# Patient Record
Sex: FEMALE | Race: WHITE | NOT HISPANIC OR LATINO | Employment: OTHER | URBAN - METROPOLITAN AREA
[De-identification: names, ages, dates, MRNs, and addresses within clinical notes are randomized per-mention and may not be internally consistent; named-entity substitution may affect disease eponyms.]

---

## 2020-04-02 ENCOUNTER — NURSING HOME VISIT (OUTPATIENT)
Dept: INTERNAL MEDICINE CLINIC | Facility: CLINIC | Age: 75
End: 2020-04-02
Payer: COMMERCIAL

## 2020-04-02 VITALS
SYSTOLIC BLOOD PRESSURE: 129 MMHG | TEMPERATURE: 97.2 F | OXYGEN SATURATION: 98 % | DIASTOLIC BLOOD PRESSURE: 59 MMHG | RESPIRATION RATE: 18 BRPM | HEART RATE: 88 BPM

## 2020-04-02 DIAGNOSIS — E11.3293 TYPE 2 DIABETES MELLITUS WITH MILD NONPROLIFERATIVE RETINOPATHY OF BOTH EYES WITHOUT MACULAR EDEMA, UNSPECIFIED WHETHER LONG TERM INSULIN USE (HCC): ICD-10-CM

## 2020-04-02 DIAGNOSIS — I10 ESSENTIAL HYPERTENSION: ICD-10-CM

## 2020-04-02 DIAGNOSIS — B02.9 HERPES ZOSTER WITHOUT COMPLICATION: Primary | ICD-10-CM

## 2020-04-02 PROBLEM — B02.8 HERPES ZOSTER WITH COMPLICATION: Status: ACTIVE | Noted: 2020-04-02

## 2020-04-02 PROCEDURE — 99309 SBSQ NF CARE MODERATE MDM 30: CPT | Performed by: NURSE PRACTITIONER

## 2020-04-06 PROBLEM — F33.9 RECURRENT MAJOR DEPRESSIVE DISORDER (HCC): Status: ACTIVE | Noted: 2020-04-06

## 2020-04-06 PROBLEM — D61.818 PANCYTOPENIA (HCC): Status: ACTIVE | Noted: 2020-04-06

## 2020-04-06 PROBLEM — F32.5 MAJOR DEPRESSIVE DISORDER IN REMISSION (HCC): Status: ACTIVE | Noted: 2020-04-06

## 2020-04-06 PROBLEM — E66.9 DIABETES MELLITUS TYPE 2 IN OBESE (HCC): Status: ACTIVE | Noted: 2020-04-06

## 2020-04-06 PROBLEM — I10 HYPERTENSION: Status: ACTIVE | Noted: 2020-04-06

## 2020-04-06 PROBLEM — E11.69 DIABETES MELLITUS TYPE 2 IN OBESE (HCC): Status: ACTIVE | Noted: 2020-04-06

## 2020-04-06 PROBLEM — D50.9 IRON DEFICIENCY ANEMIA: Status: ACTIVE | Noted: 2020-04-06

## 2020-04-06 PROBLEM — K74.60 LIVER CIRRHOSIS (HCC): Status: ACTIVE | Noted: 2020-04-06

## 2020-04-06 PROBLEM — E55.9 VITAMIN D DEFICIENCY: Status: ACTIVE | Noted: 2020-04-06

## 2020-04-06 PROBLEM — Z86.73 HISTORY OF CVA (CEREBROVASCULAR ACCIDENT): Status: ACTIVE | Noted: 2020-04-06

## 2020-04-06 PROBLEM — R33.9 URINARY RETENTION: Status: ACTIVE | Noted: 2020-04-06

## 2020-04-06 PROBLEM — H04.123 DRY EYES, BILATERAL: Status: ACTIVE | Noted: 2020-04-06

## 2020-04-06 PROBLEM — Z87.19 HISTORY OF GI BLEED: Status: ACTIVE | Noted: 2020-04-06

## 2020-04-06 PROBLEM — H91.90 HEARING LOSS: Status: ACTIVE | Noted: 2020-04-06

## 2020-04-06 PROBLEM — I44.30 HEART BLOCK, AV: Status: ACTIVE | Noted: 2020-04-06

## 2020-04-06 PROBLEM — G62.9 NEUROPATHY: Status: ACTIVE | Noted: 2020-04-06

## 2020-04-06 PROBLEM — G47.00 INSOMNIA: Status: ACTIVE | Noted: 2020-04-06

## 2020-04-06 PROBLEM — F51.01 PRIMARY INSOMNIA: Status: ACTIVE | Noted: 2020-04-06

## 2020-04-06 PROBLEM — Z95.0 HISTORY OF CARDIAC PACEMAKER: Status: ACTIVE | Noted: 2020-04-06

## 2020-04-06 RX ORDER — FERROUS SULFATE 325(65) MG
325 TABLET ORAL DAILY
COMMUNITY

## 2020-04-06 RX ORDER — ACETAMINOPHEN 325 MG/1
650 TABLET ORAL EVERY 4 HOURS PRN
COMMUNITY

## 2020-04-06 RX ORDER — METHYL SALICYLATE/MENTHOL 15%-10%
1 CREAM (GRAM) TOPICAL 3 TIMES DAILY
COMMUNITY

## 2020-04-06 RX ORDER — ESCITALOPRAM OXALATE 10 MG/1
10 TABLET ORAL DAILY
COMMUNITY

## 2020-04-06 RX ORDER — LISINOPRIL 10 MG/1
20 TABLET ORAL DAILY
COMMUNITY
End: 2020-12-24 | Stop reason: HOSPADM

## 2020-04-06 RX ORDER — TAMSULOSIN HYDROCHLORIDE 0.4 MG/1
2 CAPSULE ORAL DAILY
COMMUNITY

## 2020-04-06 RX ORDER — POLYETHYLENE GLYCOL 3350 17 G/17G
17 POWDER, FOR SOLUTION ORAL DAILY PRN
COMMUNITY

## 2020-04-06 RX ORDER — POLYVINYL ALCOHOL 14 MG/ML
1 SOLUTION/ DROPS OPHTHALMIC AS NEEDED
COMMUNITY

## 2020-04-06 RX ORDER — ALBUTEROL SULFATE 2.5 MG/3ML
2.5 SOLUTION RESPIRATORY (INHALATION) EVERY 4 HOURS PRN
COMMUNITY

## 2020-04-06 RX ORDER — FAMCICLOVIR 500 MG/1
500 TABLET, FILM COATED ORAL 3 TIMES DAILY
COMMUNITY
End: 2020-04-12

## 2020-04-06 RX ORDER — GABAPENTIN 600 MG/1
600 TABLET ORAL 3 TIMES DAILY
COMMUNITY

## 2020-04-06 RX ORDER — ASPIRIN 81 MG/1
81 TABLET ORAL DAILY
COMMUNITY

## 2020-05-04 ENCOUNTER — NURSING HOME VISIT (OUTPATIENT)
Dept: FAMILY MEDICINE CLINIC | Facility: CLINIC | Age: 75
End: 2020-05-04
Payer: COMMERCIAL

## 2020-05-04 VITALS
DIASTOLIC BLOOD PRESSURE: 86 MMHG | HEART RATE: 85 BPM | WEIGHT: 211 LBS | TEMPERATURE: 97.6 F | SYSTOLIC BLOOD PRESSURE: 176 MMHG | BODY MASS INDEX: 38.59 KG/M2 | OXYGEN SATURATION: 94 %

## 2020-05-04 DIAGNOSIS — D61.818 PANCYTOPENIA (HCC): ICD-10-CM

## 2020-05-04 DIAGNOSIS — H91.93 BILATERAL HEARING LOSS, UNSPECIFIED HEARING LOSS TYPE: ICD-10-CM

## 2020-05-04 DIAGNOSIS — E11.3293 TYPE 2 DIABETES MELLITUS WITH MILD NONPROLIFERATIVE RETINOPATHY OF BOTH EYES WITHOUT MACULAR EDEMA, UNSPECIFIED WHETHER LONG TERM INSULIN USE (HCC): Primary | ICD-10-CM

## 2020-05-04 DIAGNOSIS — K74.69 OTHER CIRRHOSIS OF LIVER (HCC): ICD-10-CM

## 2020-05-04 DIAGNOSIS — M19.90 ARTHRITIS: ICD-10-CM

## 2020-05-04 DIAGNOSIS — I10 ESSENTIAL HYPERTENSION: ICD-10-CM

## 2020-05-04 DIAGNOSIS — Z95.0 HISTORY OF CARDIAC PACEMAKER: ICD-10-CM

## 2020-05-04 DIAGNOSIS — F33.9 RECURRENT MAJOR DEPRESSIVE DISORDER, REMISSION STATUS UNSPECIFIED (HCC): ICD-10-CM

## 2020-05-04 DIAGNOSIS — D50.9 IRON DEFICIENCY ANEMIA, UNSPECIFIED IRON DEFICIENCY ANEMIA TYPE: ICD-10-CM

## 2020-05-04 DIAGNOSIS — G62.9 NEUROPATHY: ICD-10-CM

## 2020-05-04 DIAGNOSIS — E55.9 VITAMIN D DEFICIENCY: ICD-10-CM

## 2020-05-04 DIAGNOSIS — Z86.73 HISTORY OF CVA (CEREBROVASCULAR ACCIDENT): ICD-10-CM

## 2020-05-04 PROCEDURE — G2012 BRIEF CHECK IN BY MD/QHP: HCPCS | Performed by: INTERNAL MEDICINE

## 2020-05-13 ENCOUNTER — NURSING HOME VISIT (OUTPATIENT)
Dept: FAMILY MEDICINE CLINIC | Facility: CLINIC | Age: 75
End: 2020-05-13
Payer: COMMERCIAL

## 2020-05-13 VITALS
DIASTOLIC BLOOD PRESSURE: 78 MMHG | HEART RATE: 76 BPM | RESPIRATION RATE: 20 BRPM | OXYGEN SATURATION: 96 % | SYSTOLIC BLOOD PRESSURE: 128 MMHG | BODY MASS INDEX: 38.7 KG/M2 | TEMPERATURE: 97.5 F | WEIGHT: 211.6 LBS

## 2020-05-13 DIAGNOSIS — E11.3293 TYPE 2 DIABETES MELLITUS WITH MILD NONPROLIFERATIVE RETINOPATHY OF BOTH EYES WITHOUT MACULAR EDEMA, UNSPECIFIED WHETHER LONG TERM INSULIN USE (HCC): Primary | ICD-10-CM

## 2020-05-13 DIAGNOSIS — B02.9 HERPES ZOSTER WITHOUT COMPLICATION: ICD-10-CM

## 2020-05-13 DIAGNOSIS — I10 ESSENTIAL HYPERTENSION: ICD-10-CM

## 2020-05-13 DIAGNOSIS — G62.9 NEUROPATHY: ICD-10-CM

## 2020-05-13 DIAGNOSIS — E55.9 VITAMIN D DEFICIENCY: ICD-10-CM

## 2020-05-13 DIAGNOSIS — F33.9 RECURRENT MAJOR DEPRESSIVE DISORDER, REMISSION STATUS UNSPECIFIED (HCC): ICD-10-CM

## 2020-05-13 DIAGNOSIS — D61.818 PANCYTOPENIA (HCC): ICD-10-CM

## 2020-05-13 DIAGNOSIS — Z86.73 HISTORY OF CVA (CEREBROVASCULAR ACCIDENT): ICD-10-CM

## 2020-05-13 DIAGNOSIS — Z95.0 HISTORY OF CARDIAC PACEMAKER: ICD-10-CM

## 2020-05-13 DIAGNOSIS — K74.69 OTHER CIRRHOSIS OF LIVER (HCC): ICD-10-CM

## 2020-05-13 DIAGNOSIS — D50.9 IRON DEFICIENCY ANEMIA, UNSPECIFIED IRON DEFICIENCY ANEMIA TYPE: ICD-10-CM

## 2020-05-13 PROCEDURE — 99309 SBSQ NF CARE MODERATE MDM 30: CPT | Performed by: INTERNAL MEDICINE

## 2020-06-04 LAB
CREAT ?TM UR-SCNC: 121 UMOL/L
CREAT ?TM UR-SCNC: 191.9 UMOL/L
EXT MICROALBUMIN URINE RANDOM: 120
EXT PROTEIN URINE: 33.7
HBA1C MFR BLD HPLC: 5.5 %
MICROALBUMIN/CREAT UR: 159.9 MG/G{CREAT}
PROT/CREAT UR: 0.27 MG/G{CREAT}

## 2020-06-10 ENCOUNTER — NURSING HOME VISIT (OUTPATIENT)
Dept: FAMILY MEDICINE CLINIC | Facility: CLINIC | Age: 75
End: 2020-06-10
Payer: COMMERCIAL

## 2020-06-10 DIAGNOSIS — E11.3293 TYPE 2 DIABETES MELLITUS WITH MILD NONPROLIFERATIVE RETINOPATHY OF BOTH EYES WITHOUT MACULAR EDEMA, UNSPECIFIED WHETHER LONG TERM INSULIN USE (HCC): Primary | ICD-10-CM

## 2020-06-10 DIAGNOSIS — F33.9 RECURRENT MAJOR DEPRESSIVE DISORDER, REMISSION STATUS UNSPECIFIED (HCC): ICD-10-CM

## 2020-06-10 DIAGNOSIS — R60.9 CHRONIC EDEMA: ICD-10-CM

## 2020-06-10 DIAGNOSIS — D50.9 IRON DEFICIENCY ANEMIA, UNSPECIFIED IRON DEFICIENCY ANEMIA TYPE: ICD-10-CM

## 2020-06-10 DIAGNOSIS — I10 ESSENTIAL HYPERTENSION: ICD-10-CM

## 2020-06-10 DIAGNOSIS — Z86.73 HISTORY OF CVA (CEREBROVASCULAR ACCIDENT): ICD-10-CM

## 2020-06-10 DIAGNOSIS — G62.9 NEUROPATHY: ICD-10-CM

## 2020-06-10 PROCEDURE — 99442 PR PHYS/QHP TELEPHONE EVALUATION 11-20 MIN: CPT | Performed by: NURSE PRACTITIONER

## 2020-06-10 NOTE — ASSESSMENT & PLAN NOTE
Lab Results   Component Value Date    HGBA1C 5 5 06/04/2020   - Blood pressures have been well controlled mostly under 200  - Fasting blood sugar at 640 7:00 a m  was 131    - Continue Balsalgar 20 units every morning and bedtime   - Continue metformin 1000 mg b i d   - Continue NovoLog 8 units with meals

## 2020-06-10 NOTE — ASSESSMENT & PLAN NOTE
- Continue ferrous sulfate 325 daily  - will continue to monitor hemoglobin levels  CBC due this month

## 2020-06-10 NOTE — ASSESSMENT & PLAN NOTE
- Blood pressure today 123/55  - Blood pressure reviewed and have been stable  - Continue lisinopril 10 mg q d  Kori Julian

## 2020-06-10 NOTE — ASSESSMENT & PLAN NOTE
- No signs or symptoms of CVA, neurological symptoms, elevated blood pressure  - Blood sugars have been under control   - Continue aspirin 81 mg q d  and lisinopril 10 mg q d

## 2020-06-10 NOTE — ASSESSMENT & PLAN NOTE
- No increased numbness or tingling, or complains of neuropathy today  - On examination patient denies pain  - Patient remains on gabapentin 600 mg t i d

## 2020-06-10 NOTE — ASSESSMENT & PLAN NOTE
- Stable  No behavioral issues reported  - Patient appears happy and content on examination  - patient remains on escitalopram 10 mg daily

## 2020-06-11 VITALS
RESPIRATION RATE: 18 BRPM | HEART RATE: 88 BPM | SYSTOLIC BLOOD PRESSURE: 123 MMHG | DIASTOLIC BLOOD PRESSURE: 55 MMHG | TEMPERATURE: 98.2 F | OXYGEN SATURATION: 95 %

## 2020-06-11 PROBLEM — R60.9 EDEMA: Status: ACTIVE | Noted: 2020-06-11

## 2020-06-11 NOTE — ASSESSMENT & PLAN NOTE
- Patient has chronic edema of lower extremities  - She does not want to wear her compression stockings and would like them replaced with Ace bandages  - Nurse reports she probably wants this done because her roommate has Ace bandages  Discussed with nurse but patient is unable to wear Ace bandages due to her wearing braces  - Nurse will discuss with patient

## 2020-06-11 NOTE — PROGRESS NOTES
PROGRESS NOTE    Assessment/Plan:    Type 2 diabetes mellitus ([facility] )    Lab Results   Component Value Date    HGBA1C 5 5 06/04/2020   - Blood pressures have been well controlled mostly under 200  - Fasting blood sugar at 640 7:00 a m  was 131    - Continue Balsalgar 20 units every morning and bedtime   - Continue metformin 1000 mg b i d   - Continue NovoLog 8 units with meals  Hypertension  - Blood pressure today 123/55  - Blood pressure reviewed and have been stable  - Continue lisinopril 10 mg q d       Iron deficiency anemia  - Continue ferrous sulfate 325 daily  - will continue to monitor hemoglobin levels  CBC due this month  History of CVA (cerebrovascular accident)  - No signs or symptoms of CVA, neurological symptoms, elevated blood pressure  - Blood sugars have been under control   - Continue aspirin 81 mg q d  and lisinopril 10 mg q d  Recurrent major depressive disorder ([facility] )  - Stable  No behavioral issues reported  - Patient appears happy and content on examination  - patient remains on escitalopram 10 mg daily  Neuropathy  - No increased numbness or tingling, or complains of neuropathy today  - On examination patient denies pain  - Patient remains on gabapentin 600 mg t i d     Chronic edema  - Patient has chronic edema of lower extremities  - She does not want to wear her compression stockings and would like them replaced with Ace bandages  - Nurse reports she probably wants this done because her roommate has Ace bandages  Discussed with nurse but patient is unable to wear Ace bandages due to her wearing braces  - Nurse will discuss with patient         Diagnoses and all orders for this visit:    Type 2 diabetes mellitus with mild nonproliferative retinopathy of both eyes without macular edema, unspecified whether long term insulin use (HCC)    Iron deficiency anemia, unspecified iron deficiency anemia type    Essential hypertension    History of CVA (cerebrovascular accident)    Recurrent major depressive disorder, remission status unspecified ([facility] )    Neuropathy    Chronic edema          Subjective:      Patient ID: Ryan Saldaña is a 76 y o  female who is being seen for follow-up visit  Nurse reports no major issues  On examination  she complains of her compression stockings for her edema bothering her  She would like them removed and would like to he has bandages instead  Nurse reports this is because roommate has Ace bandages  She reports no increased swelling or edema or pain in her lower extremities  No reports of fever, congestion, nausea, vomiting, abdominal pain, chest pain, cough, wheezing have been reported  HPI    The following portions of the patient's history were reviewed and updated as appropriate: allergies, current medications, past family history, past medical history, past social history, past surgical history and problem list     Review of Systems   Constitutional: Negative for activity change, appetite change, chills, fatigue, fever and unexpected weight change  HENT: Negative for congestion and sore throat  Respiratory: Negative for cough and shortness of breath  Cardiovascular: Positive for leg swelling  Negative for chest pain and palpitations  Gastrointestinal: Negative for abdominal pain, constipation, diarrhea, nausea and vomiting  Endocrine: Negative for polydipsia and polyuria  Genitourinary: Negative for difficulty urinating and dysuria  Musculoskeletal: Positive for gait problem  Negative for back pain  Skin: Negative for pallor and rash  Neurological: Negative for dizziness, facial asymmetry, speech difficulty and headaches  Psychiatric/Behavioral: Negative for agitation, behavioral problems and confusion  Procedures      Objective:      /55   Pulse 88   Temp 98 2 °F (36 8 °C)   Resp 18   SpO2 95%          Physical Exam   Constitutional: She appears well-developed and well-nourished  No distress  HENT:   Head: Normocephalic and atraumatic  Eyes: Right eye exhibits no discharge  Left eye exhibits no discharge  No scleral icterus  Pulmonary/Chest: Effort normal  No stridor  No respiratory distress  She has no wheezes  Musculoskeletal: She exhibits edema  Neurological: She is alert  Skin: She is not diaphoretic  Psychiatric: She has a normal mood and affect  Her behavior is normal          Most recent labs have been reviewed  Care coordination with nurse      Electronically signed by LEANNE Sanchez

## 2020-06-24 ENCOUNTER — NURSING HOME VISIT (OUTPATIENT)
Dept: FAMILY MEDICINE CLINIC | Facility: CLINIC | Age: 75
End: 2020-06-24
Payer: COMMERCIAL

## 2020-06-24 DIAGNOSIS — Z86.73 HISTORY OF CVA (CEREBROVASCULAR ACCIDENT): ICD-10-CM

## 2020-06-24 DIAGNOSIS — R60.9 CHRONIC EDEMA: ICD-10-CM

## 2020-06-24 DIAGNOSIS — E11.3293 TYPE 2 DIABETES MELLITUS WITH MILD NONPROLIFERATIVE RETINOPATHY OF BOTH EYES WITHOUT MACULAR EDEMA, UNSPECIFIED WHETHER LONG TERM INSULIN USE (HCC): Primary | ICD-10-CM

## 2020-06-24 DIAGNOSIS — D50.9 IRON DEFICIENCY ANEMIA, UNSPECIFIED IRON DEFICIENCY ANEMIA TYPE: ICD-10-CM

## 2020-06-24 DIAGNOSIS — K74.69 OTHER CIRRHOSIS OF LIVER (HCC): ICD-10-CM

## 2020-06-24 DIAGNOSIS — H91.93 BILATERAL HEARING LOSS, UNSPECIFIED HEARING LOSS TYPE: ICD-10-CM

## 2020-06-24 DIAGNOSIS — G62.9 NEUROPATHY: ICD-10-CM

## 2020-06-24 DIAGNOSIS — I10 ESSENTIAL HYPERTENSION: ICD-10-CM

## 2020-06-24 DIAGNOSIS — Z87.19 HISTORY OF GI BLEED: ICD-10-CM

## 2020-06-24 DIAGNOSIS — E55.9 VITAMIN D DEFICIENCY: ICD-10-CM

## 2020-06-24 DIAGNOSIS — F33.9 RECURRENT MAJOR DEPRESSIVE DISORDER, REMISSION STATUS UNSPECIFIED (HCC): ICD-10-CM

## 2020-06-24 DIAGNOSIS — Z95.0 HISTORY OF CARDIAC PACEMAKER: ICD-10-CM

## 2020-06-24 PROCEDURE — 99309 SBSQ NF CARE MODERATE MDM 30: CPT | Performed by: INTERNAL MEDICINE

## 2020-06-25 VITALS
SYSTOLIC BLOOD PRESSURE: 123 MMHG | HEART RATE: 88 BPM | BODY MASS INDEX: 38.81 KG/M2 | RESPIRATION RATE: 18 BRPM | OXYGEN SATURATION: 98 % | DIASTOLIC BLOOD PRESSURE: 55 MMHG | TEMPERATURE: 97.3 F | WEIGHT: 212.2 LBS

## 2020-07-04 ENCOUNTER — LAB REQUISITION (OUTPATIENT)
Dept: LAB | Facility: HOSPITAL | Age: 75
End: 2020-07-04
Payer: COMMERCIAL

## 2020-07-04 DIAGNOSIS — B97.29 OTHER CORONAVIRUS AS THE CAUSE OF DISEASES CLASSIFIED ELSEWHERE: ICD-10-CM

## 2020-07-04 PROCEDURE — U0003 INFECTIOUS AGENT DETECTION BY NUCLEIC ACID (DNA OR RNA); SEVERE ACUTE RESPIRATORY SYNDROME CORONAVIRUS 2 (SARS-COV-2) (CORONAVIRUS DISEASE [COVID-19]), AMPLIFIED PROBE TECHNIQUE, MAKING USE OF HIGH THROUGHPUT TECHNOLOGIES AS DESCRIBED BY CMS-2020-01-R: HCPCS | Performed by: NURSE PRACTITIONER

## 2020-07-06 LAB — INPATIENT: NORMAL

## 2020-07-07 LAB — SARS-COV-2 RNA SPEC QL NAA+PROBE: NORMAL

## 2020-08-01 ENCOUNTER — HOSPITAL ENCOUNTER (EMERGENCY)
Facility: HOSPITAL | Age: 75
Discharge: HOME | End: 2020-08-01
Attending: EMERGENCY MEDICINE | Admitting: EMERGENCY MEDICINE
Payer: COMMERCIAL

## 2020-08-01 VITALS
OXYGEN SATURATION: 98 % | TEMPERATURE: 98.6 F | WEIGHT: 228.84 LBS | SYSTOLIC BLOOD PRESSURE: 207 MMHG | DIASTOLIC BLOOD PRESSURE: 90 MMHG | BODY MASS INDEX: 41.85 KG/M2 | RESPIRATION RATE: 22 BRPM | HEART RATE: 76 BPM

## 2020-08-01 DIAGNOSIS — S00.412A ABRASION OF LEFT EAR CANAL, INITIAL ENCOUNTER: Primary | ICD-10-CM

## 2020-08-01 PROCEDURE — 99282 EMERGENCY DEPT VISIT SF MDM: CPT | Performed by: EMERGENCY MEDICINE

## 2020-08-01 PROCEDURE — 99283 EMERGENCY DEPT VISIT LOW MDM: CPT

## 2020-08-01 NOTE — ED ATTENDING ATTESTATION
8/1/2020  IRohan DO, saw and evaluated the patient  I have discussed the patient with the resident/non-physician practitioner and agree with the resident's/non-physician practitioner's findings, Plan of Care, and MDM as documented in the resident's/non-physician practitioner's note, except where noted  All available labs and Radiology studies were reviewed  I was present for key portions of any procedure(s) performed by the resident/non-physician practitioner and I was immediately available to provide assistance  At this point I agree with the current assessment done in the Emergency Department  I have conducted an independent evaluation of this patient a history and physical is as follows:      49-year-old female, dementia, from [place], having bleeding her left ear canal, patient had her ears cleaned yesterday as they were cerumen impacted, there was some bleeding thereafter, who is throughout the night and will not stop oozing today  Patient denies any pain in the area  No modifying or alleviating factors, no pain currently no fevers no ringing in the ears no hearing loss      Review of Systems   Constitutional: Negative for fever  Respiratory: Negative for chest tightness and shortness of breath  Cardiovascular: Negative for chest pain  Skin: Negative for rash  Neurological: Negative for dizziness, light-headedness and headaches  Physical Exam   Constitutional: She appears well-developed  HENT:   Head: Atraumatic  Abrasion in left ear canal, area irrigated gently, abrasion slightly oozing/bleeding  Merocel packing placed in the ear canal stop bleeding monitor for half an hour no further bleeding   Eyes: Conjunctivae are normal  Right eye exhibits no discharge  Left eye exhibits no discharge  No scleral icterus  Neck: Neck supple  No tracheal deviation present  Pulmonary/Chest: Effort normal  No stridor  No respiratory distress     Musculoskeletal: She exhibits no deformity  Neurological: She is alert  She exhibits normal muscle tone  Coordination normal    Skin: Skin is warm and dry  No rash noted  No erythema  No pallor  Psychiatric: She has a normal mood and affect  Vitals reviewed  MDM  Number of Diagnoses or Management Options  Abrasion of left ear canal, initial encounter: new, needed workup  Diagnosis management comments: Ultimately discharged after Merocel packing placed in left ear, hemostasis achieved patient nursing home instructed to remove the packing in 2 day, ENT if continued bleeding          ED Course         Critical Care Time  Procedures      Time reflects when diagnosis was documented in both MDM as applicable and the Disposition within this note     Time User Action Codes Description Comment    8/1/2020  1:53 PM Fermin Garcia Add [S00 412A] Abrasion of left ear canal, initial encounter       ED Disposition     ED Disposition Condition Date/Time Comment    Discharge Stable Sat Aug 1, 2020  1:54 PM Elidia Steiner discharge to home/self care  Follow-up Information     Follow up With Specialties Details Why Amanda Prieto MD Internal Medicine In 1 week If symptoms worsen [address]      Julien Gillis MD Otolaryngology  If symptoms worsen or bleeding persist [address] [facility]   - [address]  [address] [place] [address]

## 2020-08-01 NOTE — ED NOTES
Packing placed in patients left ear by Dr Pk Boyd,  Will discharge back to [place] with OP ENT consult     José Luis Gonsales RN  08/01/20 3085

## 2020-08-01 NOTE — ED PROVIDER NOTES
History  Chief Complaint   Patient presents with    Ear Drainage     Pt presents to the ED from [place] with c/o bleeding from left ear  Received ear lavage last night, bleeding off and on since  Pt has no complaints, hard of hearing  76year old female patient presents to ED due to left ear bleeding  She denies ear pain  States she had ear lavage yesterday  No other complaints  Patient is not in distress  History provided by:  Patient      Prior to Admission Medications   Prescriptions Last Dose Informant Patient Reported? Taking?    Melatonin 3 MG CAPS   Yes No   Sig: Take 3 mg by mouth daily at bedtime   Menthol-Methyl Salicylate ([employer] MUSCLE RUB) 10-15 % CREA   Yes No   Sig: Apply 1 application topically 3 (three) times a day Apply to shoulders and neck   acetaminophen (TYLENOL) 325 mg tablet   Yes No   Sig: Take 650 mg by mouth every 4 (four) hours as needed   albuterol (2 5 mg/3 mL) 0 083 % nebulizer solution   Yes No   Sig: Take 2 5 mg by nebulization every 4 (four) hours as needed   aspirin (ECOTRIN LOW STRENGTH) 81 mg EC tablet   Yes No   Sig: Take 81 mg by mouth daily   escitalopram (LEXAPRO) 10 mg tablet   Yes No   Sig: Take 10 mg by mouth daily   famciclovir (FAMVIR) 500 mg tablet   Yes No   Sig: Take 500 mg by mouth 3 (three) times a day   ferrous sulfate 325 (65 Fe) mg tablet   Yes No   Sig: Take 325 mg by mouth daily   gabapentin (NEURONTIN) 600 MG tablet   Yes No   Sig: Take 600 mg by mouth 3 (three) times a day   glucagon 1 MG injection   Yes No   Sig: Inject 1 mg into a muscle once as needed   guaiFENesin (ROBITUSSIN) 100 MG/5ML oral liquid   Yes No   Sig: Take 10 mL by mouth every 4 (four) hours as needed   insulin aspart (NovoLOG) 100 units/mL injection   Yes No   Sig: Inject 15 Units under the skin 3 (three) times a day before meals   insulin glargine (LANTUS SOLOSTAR) 100 units/mL injection pen   Yes No   Sig: Inject 34 Units under the skin 2 (two) times a day Every morning and at bedtime   linaGLIPtin 5 MG TABS   Yes No   Sig: Take 5 mg by mouth daily   lisinopril (ZESTRIL) 10 mg tablet   Yes No   Sig: Take 10 mg by mouth daily   metFORMIN (GLUCOPHAGE) 500 mg tablet   Yes No   Sig: Take 1,000 mg by mouth 2 (two) times a day with meals   polyethylene glycol (MIRALAX) 17 g packet   Yes No   Sig: Take 17 g by mouth daily as needed   polyvinyl alcohol (LIQUIFILM TEARS) 1 4 % ophthalmic solution   Yes No   Sig: Administer 1 drop to both eyes as needed   tamsulosin (FLOMAX) 0 4 mg   Yes No   Sig: Take 2 capsules by mouth daily      Facility-Administered Medications: None       Past Medical History:   Diagnosis Date    AV block     Benign neoplasm of pituitary gland ([facility] ) 10/2/2013    CVA (cerebral vascular accident) ([facility] )     Depression     Diabetes mellitus ([facility] )     Hearing loss     Hyperlipidemia     Neuropathy     Stenosis of carotid artery        Past Surgical History:   Procedure Laterality Date    JOINT REPLACEMENT      right knee       History reviewed  No pertinent family history  I have reviewed and agree with the history as documented  E-Cigarette/Vaping     E-Cigarette/Vaping Substances     Social History     Tobacco Use    Smoking status: Former Smoker    Smokeless tobacco: Never Used   Substance Use Topics    Alcohol use: Not Currently    Drug use: Never        Review of Systems   Constitutional: Negative for chills and fever  HENT: Positive for ear discharge (bleeding) and hearing loss (chronic)  Negative for tinnitus  Respiratory: Negative for cough and shortness of breath  Cardiovascular: Negative for chest pain and palpitations  Gastrointestinal: Negative for abdominal distention and abdominal pain  Endocrine: Negative for polydipsia and polyuria  Genitourinary: Negative for dysuria and flank pain  Musculoskeletal: Positive for arthralgias  Neurological: Negative for dizziness and headaches     Psychiatric/Behavioral: Negative for agitation and behavioral problems  Physical Exam  ED Triage Vitals [08/01/20 1237]   Temperature Pulse Respirations Blood Pressure SpO2   98 6 °F (37 °C) 76 22 (!) 207/90 98 %      Temp Source Heart Rate Source Patient Position - Orthostatic VS BP Location FiO2 (%)   Oral Monitor -- Left arm --      Pain Score       --             Orthostatic Vital Signs  Vitals:    08/01/20 1237   BP: (!) 207/90   Pulse: 76       Physical Exam   Constitutional: She is oriented to person, place, and time  She appears well-developed and well-nourished  HENT:   Head: Normocephalic  Left ear canal is full of blood   Neck: Neck supple  Cardiovascular: Normal rate, regular rhythm, normal heart sounds and intact distal pulses  Pulmonary/Chest: Effort normal and breath sounds normal    Abdominal: Soft  Bowel sounds are normal    Musculoskeletal: She exhibits edema  Neurological: She is alert and oriented to person, place, and time  Skin: Skin is warm and dry  Psychiatric: She has a normal mood and affect  Her behavior is normal    Nursing note and vitals reviewed  ED Medications  Medications - No data to display    Diagnostic Studies  Results Reviewed     None                 No orders to display         Procedures  Procedures      ED Course       US AUDIT      Most Recent Value   Initial Alcohol Screen: US AUDIT-C    1  How often do you have a drink containing alcohol?  0 Filed at: 08/01/2020 1243   2  How many drinks containing alcohol do you have on a typical day you are drinking? 0 Filed at: 08/01/2020 1243   3a  Male UNDER 65: How often do you have five or more drinks on one occasion? 0 Filed at: 08/01/2020 1243   3b  FEMALE Any Age, or MALE 65+: How often do you have 4 or more drinks on one occassion? 0 Filed at: 08/01/2020 1243   Audit-C Score  0 Filed at: 08/01/2020 1243                  TENA/DAST-10      Most Recent Value   How many times in the past year have you       Used an illegal drug or used a prescription medication for non-medical reasons? Never Filed at: 08/01/2020 1243                              [facility]  Number of Diagnoses or Management Options  Abrasion of left ear canal, initial encounter:   Diagnosis management comments: Cleaned   No more active bleeding  Disposition  Final diagnoses:   Abrasion of left ear canal, initial encounter     Time reflects when diagnosis was documented in both MDM as applicable and the Disposition within this note     Time User Action Codes Description Comment    8/1/2020  1:53 PM Fermin Garcia Add [S00 412A] Abrasion of left ear canal, initial encounter       ED Disposition     ED Disposition Condition Date/Time Comment    Discharge Stable Sat Aug 1, 2020  1:54 PM Marielena Steiner discharge to home/self care  Follow-up Information     Follow up With Specialties Details Why Adriana Merchant MD Internal Medicine In 1 week If symptoms worsen [address]      Ether Lanes, MD Otolaryngology  If symptoms worsen or bleeding persist [address] [facility]  - [address]  [facility]  [phone]            Patient's Medications   Discharge Prescriptions    No medications on file     No discharge procedures on file  PDMP Review     None           ED Provider  Attending physically available and evaluated [address]  I managed the patient along with the ED Attending      Electronically Signed by         Cheyenne Ko MD  08/01/20 6401

## 2020-08-01 NOTE — ED NOTES
Call placed to [facility] for transport back to [address] [place] [address], they will call us with a time     Jessica Saldana RN  08/01/20 6698

## 2020-10-02 ENCOUNTER — HOSPITAL ENCOUNTER (OUTPATIENT)
Facility: HOSPITAL | Age: 75
Setting detail: OBSERVATION
Discharge: NURSING, REHAB OR LONG-TERM CARE | End: 2020-10-03
Attending: EMERGENCY MEDICINE | Admitting: SURGERY
Payer: COMMERCIAL

## 2020-10-02 ENCOUNTER — APPOINTMENT (EMERGENCY)
Dept: RADIOLOGY | Facility: HOSPITAL | Age: 75
End: 2020-10-02
Payer: COMMERCIAL

## 2020-10-02 DIAGNOSIS — S01.91XA TRAUMATIC HEAD INJURY WITH MULTIPLE LACERATIONS: Primary | ICD-10-CM

## 2020-10-02 DIAGNOSIS — S00.83XA HEMATOMA OF OCCIPITAL SURFACE OF HEAD: ICD-10-CM

## 2020-10-02 DIAGNOSIS — D69.6 THROMBOCYTOPENIA (HCC): ICD-10-CM
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
## 2020-10-02 DIAGNOSIS — D64.9 ANEMIA: ICD-10-CM

## 2020-10-02 DIAGNOSIS — S09.90XA TRAUMATIC HEAD INJURY WITH MULTIPLE LACERATIONS: Primary | ICD-10-CM

## 2020-10-02 PROBLEM — R13.10 DYSPHAGIA: Status: ACTIVE | Noted: 2017-03-23

## 2020-10-02 PROBLEM — I69.922: Status: ACTIVE | Noted: 2017-05-12

## 2020-10-02 PROBLEM — I45.10 UNSPECIFIED RIGHT BUNDLE-BRANCH BLOCK: Status: ACTIVE | Noted: 2017-05-12

## 2020-10-02 PROBLEM — Z79.4 LONG TERM (CURRENT) USE OF INSULIN (HCC): Status: ACTIVE | Noted: 2017-05-12

## 2020-10-02 PROBLEM — Z96.651 PRESENCE OF RIGHT ARTIFICIAL KNEE JOINT: Status: ACTIVE | Noted: 2019-11-29

## 2020-10-02 PROBLEM — I63.312 CEREBROVASCULAR ACCIDENT (CVA) DUE TO THROMBOSIS OF LEFT MIDDLE CEREBRAL ARTERY (HCC): Status: ACTIVE | Noted: 2017-03-21

## 2020-10-02 PROBLEM — G62.9 POLYNEUROPATHY, UNSPECIFIED: Status: ACTIVE | Noted: 2017-05-12

## 2020-10-02 PROBLEM — R19.5 OTHER FECAL ABNORMALITIES: Status: ACTIVE | Noted: 2018-11-08

## 2020-10-02 PROBLEM — I69.951 HEMIPLEGIA AND HEMIPARESIS FOLLOWING UNSPECIFIED CEREBROVASCULAR DISEASE AFFECTING RIGHT DOMINANT SIDE (HCC): Status: ACTIVE | Noted: 2017-05-12

## 2020-10-02 PROBLEM — F41.9 ANXIETY DISORDER, UNSPECIFIED: Status: ACTIVE | Noted: 2017-05-12

## 2020-10-02 PROBLEM — K59.00 CONSTIPATION, UNSPECIFIED: Status: ACTIVE | Noted: 2017-05-12

## 2020-10-02 PROBLEM — D33.2: Status: ACTIVE | Noted: 2017-05-12

## 2020-10-02 PROBLEM — G89.29 OTHER CHRONIC PAIN: Status: ACTIVE | Noted: 2017-05-12

## 2020-10-02 PROBLEM — Z96.662: Status: ACTIVE | Noted: 2019-11-29

## 2020-10-02 PROBLEM — G81.91 HEMIPARESIS, RIGHT (HCC): Status: ACTIVE | Noted: 2017-03-21

## 2020-10-02 PROBLEM — I05.0 MITRAL VALVE STENOSIS: Status: ACTIVE | Noted: 2017-03-23

## 2020-10-02 PROBLEM — Z90.710 ABSENCE OF BOTH CERVIX AND UTERUS, ACQUIRED: Status: ACTIVE | Noted: 2019-11-29

## 2020-10-02 PROBLEM — I65.29 OCCLUSION AND STENOSIS OF UNSPECIFIED CAROTID ARTERY: Status: ACTIVE | Noted: 2018-11-08

## 2020-10-02 PROBLEM — I35.0 NONRHEUMATIC AORTIC (VALVE) STENOSIS: Status: ACTIVE | Noted: 2019-11-29

## 2020-10-02 PROBLEM — M54.2 NECK PAIN: Status: ACTIVE | Noted: 2019-07-18

## 2020-10-02 PROBLEM — E78.2 MIXED HYPERLIPIDEMIA: Status: ACTIVE | Noted: 2017-05-12

## 2020-10-02 PROBLEM — N39.490 OVERFLOW INCONTINENCE: Status: ACTIVE | Noted: 2017-12-06

## 2020-10-02 PROBLEM — M47.812 CERVICAL SPONDYLOSIS WITHOUT MYELOPATHY: Status: ACTIVE | Noted: 2019-06-12

## 2020-10-02 PROBLEM — I69.918: Status: ACTIVE | Noted: 2017-05-12

## 2020-10-02 PROBLEM — E78.00 PURE HYPERCHOLESTEROLEMIA: Status: ACTIVE | Noted: 2017-07-06

## 2020-10-02 PROBLEM — E11.40 TYPE 2 DIABETES MELLITUS WITH DIABETIC NEUROPATHY, UNSPECIFIED (HCC): Status: ACTIVE | Noted: 2017-05-12

## 2020-10-02 LAB
ERYTHROCYTE [DISTWIDTH] IN BLOOD BY AUTOMATED COUNT: 16.4 % (ref 11.6–15.1)
GLUCOSE SERPL-MCNC: 133 MG/DL (ref 65–140)
HCT VFR BLD AUTO: 25.5 % (ref 34.8–46.1)
HGB BLD-MCNC: 7.8 G/DL (ref 11.5–15.4)
MCH RBC QN AUTO: 26.4 PG (ref 26.8–34.3)
MCHC RBC AUTO-ENTMCNC: 30.6 G/DL (ref 31.4–37.4)
MCV RBC AUTO: 86 FL (ref 82–98)
PLATELET # BLD AUTO: 81 THOUSANDS/UL (ref 149–390)
PMV BLD AUTO: 9.7 FL (ref 8.9–12.7)
RBC # BLD AUTO: 2.95 MILLION/UL (ref 3.81–5.12)
WBC # BLD AUTO: 5.37 THOUSAND/UL (ref 4.31–10.16)

## 2020-10-02 PROCEDURE — 36415 COLL VENOUS BLD VENIPUNCTURE: CPT | Performed by: SURGERY

## 2020-10-02 PROCEDURE — NC001 PR NO CHARGE: Performed by: SURGERY

## 2020-10-02 PROCEDURE — 70450 CT HEAD/BRAIN W/O DYE: CPT

## 2020-10-02 PROCEDURE — 99285 EMERGENCY DEPT VISIT HI MDM: CPT | Performed by: EMERGENCY MEDICINE

## 2020-10-02 PROCEDURE — 12002 RPR S/N/AX/GEN/TRNK2.6-7.5CM: CPT | Performed by: SURGERY

## 2020-10-02 PROCEDURE — 82948 REAGENT STRIP/BLOOD GLUCOSE: CPT

## 2020-10-02 PROCEDURE — 90471 IMMUNIZATION ADMIN: CPT

## 2020-10-02 PROCEDURE — 99285 EMERGENCY DEPT VISIT HI MDM: CPT

## 2020-10-02 PROCEDURE — 99218 PR INITIAL OBSERVATION CARE/DAY 30 MINUTES: CPT | Performed by: SURGERY

## 2020-10-02 PROCEDURE — 90715 TDAP VACCINE 7 YRS/> IM: CPT | Performed by: EMERGENCY MEDICINE

## 2020-10-02 PROCEDURE — 85027 COMPLETE CBC AUTOMATED: CPT | Performed by: SURGERY

## 2020-10-02 RX ORDER — POLYETHYLENE GLYCOL 3350 17 G/17G
17 POWDER, FOR SOLUTION ORAL DAILY PRN
Status: DISCONTINUED | OUTPATIENT
Start: 2020-10-02 | End: 2020-10-03 | Stop reason: HOSPADM

## 2020-10-02 RX ORDER — ALBUTEROL SULFATE 2.5 MG/3ML
2.5 SOLUTION RESPIRATORY (INHALATION) EVERY 4 HOURS PRN
Status: DISCONTINUED | OUTPATIENT
Start: 2020-10-02 | End: 2020-10-03 | Stop reason: HOSPADM

## 2020-10-02 RX ORDER — METOPROLOL TARTRATE 50 MG/1
75 TABLET, FILM COATED ORAL EVERY 12 HOURS SCHEDULED
COMMUNITY

## 2020-10-02 RX ORDER — INSULIN GLARGINE 100 [IU]/ML
34 INJECTION, SOLUTION SUBCUTANEOUS EVERY 12 HOURS SCHEDULED
Status: DISCONTINUED | OUTPATIENT
Start: 2020-10-03 | End: 2020-10-03 | Stop reason: HOSPADM

## 2020-10-02 RX ORDER — TAMSULOSIN HYDROCHLORIDE 0.4 MG/1
0.8 CAPSULE ORAL DAILY
Status: DISCONTINUED | OUTPATIENT
Start: 2020-10-03 | End: 2020-10-03 | Stop reason: HOSPADM

## 2020-10-02 RX ORDER — GABAPENTIN 300 MG/1
600 CAPSULE ORAL 3 TIMES DAILY
Status: DISCONTINUED | OUTPATIENT
Start: 2020-10-02 | End: 2020-10-03 | Stop reason: HOSPADM

## 2020-10-02 RX ORDER — ESCITALOPRAM OXALATE 10 MG/1
10 TABLET ORAL DAILY
Status: DISCONTINUED | OUTPATIENT
Start: 2020-10-03 | End: 2020-10-03 | Stop reason: HOSPADM

## 2020-10-02 RX ORDER — LISINOPRIL 10 MG/1
10 TABLET ORAL DAILY
Status: DISCONTINUED | OUTPATIENT
Start: 2020-10-03 | End: 2020-10-03 | Stop reason: HOSPADM

## 2020-10-02 RX ORDER — LIDOCAINE HYDROCHLORIDE AND EPINEPHRINE 10; 10 MG/ML; UG/ML
10 INJECTION, SOLUTION INFILTRATION; PERINEURAL ONCE
Status: COMPLETED | OUTPATIENT
Start: 2020-10-02 | End: 2020-10-02

## 2020-10-02 RX ORDER — LANOLIN ALCOHOL/MO/W.PET/CERES
3 CREAM (GRAM) TOPICAL
Status: DISCONTINUED | OUTPATIENT
Start: 2020-10-02 | End: 2020-10-03 | Stop reason: HOSPADM

## 2020-10-02 RX ORDER — ACETAMINOPHEN 325 MG/1
325 TABLET ORAL EVERY 4 HOURS PRN
Status: DISCONTINUED | OUTPATIENT
Start: 2020-10-02 | End: 2020-10-03 | Stop reason: HOSPADM

## 2020-10-02 RX ORDER — ASCORBIC ACID 500 MG
500 TABLET ORAL DAILY
COMMUNITY

## 2020-10-02 RX ADMIN — TETANUS TOXOID, REDUCED DIPHTHERIA TOXOID AND ACELLULAR PERTUSSIS VACCINE, ADSORBED 0.5 ML: 5; 2.5; 8; 8; 2.5 SUSPENSION INTRAMUSCULAR at 18:38

## 2020-10-02 RX ADMIN — INSULIN GLARGINE 34 UNITS: 100 INJECTION, SOLUTION SUBCUTANEOUS at 23:59

## 2020-10-02 RX ADMIN — GABAPENTIN 600 MG: 300 CAPSULE ORAL at 22:14

## 2020-10-02 RX ADMIN — LIDOCAINE HYDROCHLORIDE,EPINEPHRINE BITARTRATE 10 ML: 10; .01 INJECTION, SOLUTION INFILTRATION; PERINEURAL at 18:58

## 2020-10-02 RX ADMIN — MELATONIN 3 MG: at 22:17

## 2020-10-03 VITALS
TEMPERATURE: 98.2 F | RESPIRATION RATE: 16 BRPM | SYSTOLIC BLOOD PRESSURE: 150 MMHG | DIASTOLIC BLOOD PRESSURE: 66 MMHG | HEART RATE: 76 BPM | OXYGEN SATURATION: 96 %

## 2020-10-03 LAB
ERYTHROCYTE [DISTWIDTH] IN BLOOD BY AUTOMATED COUNT: 16.6 % (ref 11.6–15.1)
GLUCOSE SERPL-MCNC: 157 MG/DL (ref 65–140)
GLUCOSE SERPL-MCNC: 91 MG/DL (ref 65–140)
HCT VFR BLD AUTO: 25.8 % (ref 34.8–46.1)
HGB BLD-MCNC: 8 G/DL (ref 11.5–15.4)
MCH RBC QN AUTO: 27 PG (ref 26.8–34.3)
MCHC RBC AUTO-ENTMCNC: 31 G/DL (ref 31.4–37.4)
MCV RBC AUTO: 87 FL (ref 82–98)
PLATELET # BLD AUTO: 91 THOUSANDS/UL (ref 149–390)
PMV BLD AUTO: 10 FL (ref 8.9–12.7)
RBC # BLD AUTO: 2.96 MILLION/UL (ref 3.81–5.12)
WBC # BLD AUTO: 4.41 THOUSAND/UL (ref 4.31–10.16)

## 2020-10-03 PROCEDURE — 82948 REAGENT STRIP/BLOOD GLUCOSE: CPT

## 2020-10-03 PROCEDURE — 99217 PR OBSERVATION CARE DISCHARGE MANAGEMENT: CPT | Performed by: SURGERY

## 2020-10-03 PROCEDURE — 85027 COMPLETE CBC AUTOMATED: CPT | Performed by: SURGERY

## 2020-10-03 RX ADMIN — ESCITALOPRAM OXALATE 10 MG: 10 TABLET ORAL at 09:32

## 2020-10-03 RX ADMIN — TAMSULOSIN HYDROCHLORIDE 0.8 MG: 0.4 CAPSULE ORAL at 09:32

## 2020-10-03 RX ADMIN — INSULIN LISPRO 1 UNITS: 100 INJECTION, SOLUTION INTRAVENOUS; SUBCUTANEOUS at 11:56

## 2020-10-03 RX ADMIN — GABAPENTIN 600 MG: 300 CAPSULE ORAL at 09:32

## 2020-10-03 RX ADMIN — LISINOPRIL 10 MG: 10 TABLET ORAL at 09:31

## 2020-10-03 RX ADMIN — INSULIN GLARGINE 34 UNITS: 100 INJECTION, SOLUTION SUBCUTANEOUS at 09:30

## 2020-12-22 ENCOUNTER — APPOINTMENT (INPATIENT)
Dept: ULTRASOUND IMAGING | Facility: HOSPITAL | Age: 75
DRG: 378 | End: 2020-12-22
Payer: COMMERCIAL

## 2020-12-22 ENCOUNTER — HOSPITAL ENCOUNTER (INPATIENT)
Facility: HOSPITAL | Age: 75
LOS: 2 days | Discharge: NURSING, REHAB OR LONG-TERM CARE | DRG: 378 | End: 2020-12-24
Attending: EMERGENCY MEDICINE | Admitting: INTERNAL MEDICINE
Payer: COMMERCIAL

## 2020-12-22 DIAGNOSIS — K74.60 DECOMPENSATED HEPATIC CIRRHOSIS (HCC): ICD-10-CM

## 2020-12-22 DIAGNOSIS — K72.90 DECOMPENSATED HEPATIC CIRRHOSIS (HCC): ICD-10-CM

## 2020-12-22 DIAGNOSIS — K62.5 RECTAL BLEEDING: Primary | ICD-10-CM

## 2020-12-22 DIAGNOSIS — D61.818 PANCYTOPENIA (HCC): ICD-10-CM

## 2020-12-22 DIAGNOSIS — N17.9 AKI (ACUTE KIDNEY INJURY) (HCC): ICD-10-CM

## 2020-12-22 DIAGNOSIS — R60.9 CHRONIC EDEMA: ICD-10-CM

## 2020-12-22 PROBLEM — K92.1 HEMATOCHEZIA: Status: ACTIVE | Noted: 2020-12-22

## 2020-12-22 LAB
ALBUMIN SERPL BCP-MCNC: 3.5 G/DL (ref 3.5–5)
ALP SERPL-CCNC: 87 U/L (ref 46–116)
ALT SERPL W P-5'-P-CCNC: 25 U/L (ref 12–78)
ANION GAP SERPL CALCULATED.3IONS-SCNC: 9 MMOL/L (ref 4–13)
AST SERPL W P-5'-P-CCNC: 19 U/L (ref 5–45)
BASOPHILS # BLD AUTO: 0.02 THOUSANDS/ΜL (ref 0–0.1)
BASOPHILS NFR BLD AUTO: 1 % (ref 0–1)
BILIRUB SERPL-MCNC: 0.4 MG/DL (ref 0.2–1)
BUN SERPL-MCNC: 44 MG/DL (ref 5–25)
CALCIUM SERPL-MCNC: 9.1 MG/DL (ref 8.3–10.1)
CHLORIDE SERPL-SCNC: 107 MMOL/L (ref 100–108)
CO2 SERPL-SCNC: 25 MMOL/L (ref 21–32)
CREAT SERPL-MCNC: 1.7 MG/DL (ref 0.6–1.3)
EOSINOPHIL # BLD AUTO: 0.06 THOUSAND/ΜL (ref 0–0.61)
EOSINOPHIL NFR BLD AUTO: 2 % (ref 0–6)
ERYTHROCYTE [DISTWIDTH] IN BLOOD BY AUTOMATED COUNT: 17.6 % (ref 11.6–15.1)
FLUAV RNA RESP QL NAA+PROBE: NEGATIVE
FLUBV RNA RESP QL NAA+PROBE: NEGATIVE
GFR SERPL CREATININE-BSD FRML MDRD: 29 ML/MIN/1.73SQ M
GLUCOSE SERPL-MCNC: 123 MG/DL (ref 65–140)
GLUCOSE SERPL-MCNC: 48 MG/DL (ref 65–140)
GLUCOSE SERPL-MCNC: 56 MG/DL (ref 65–140)
GLUCOSE SERPL-MCNC: 91 MG/DL (ref 65–140)
GLUCOSE SERPL-MCNC: 92 MG/DL (ref 65–140)
HCT VFR BLD AUTO: 26.5 % (ref 34.8–46.1)
HCT VFR BLD AUTO: 29.8 % (ref 34.8–46.1)
HGB BLD-MCNC: 7.8 G/DL (ref 11.5–15.4)
HGB BLD-MCNC: 8.7 G/DL (ref 11.5–15.4)
IMM GRANULOCYTES # BLD AUTO: 0.01 THOUSAND/UL (ref 0–0.2)
IMM GRANULOCYTES NFR BLD AUTO: 0 % (ref 0–2)
INR PPP: 1.23 (ref 0.84–1.19)
LYMPHOCYTES # BLD AUTO: 0.7 THOUSANDS/ΜL (ref 0.6–4.47)
LYMPHOCYTES NFR BLD AUTO: 25 % (ref 14–44)
MCH RBC QN AUTO: 26 PG (ref 26.8–34.3)
MCHC RBC AUTO-ENTMCNC: 29.2 G/DL (ref 31.4–37.4)
MCV RBC AUTO: 89 FL (ref 82–98)
MONOCYTES # BLD AUTO: 0.37 THOUSAND/ΜL (ref 0.17–1.22)
MONOCYTES NFR BLD AUTO: 13 % (ref 4–12)
NEUTROPHILS # BLD AUTO: 1.69 THOUSANDS/ΜL (ref 1.85–7.62)
NEUTS SEG NFR BLD AUTO: 59 % (ref 43–75)
NRBC BLD AUTO-RTO: 0 /100 WBCS
PLATELET # BLD AUTO: 87 THOUSANDS/UL (ref 149–390)
PMV BLD AUTO: 9.9 FL (ref 8.9–12.7)
POTASSIUM SERPL-SCNC: 5.3 MMOL/L (ref 3.5–5.3)
PROT SERPL-MCNC: 7.8 G/DL (ref 6.4–8.2)
PROTHROMBIN TIME: 15.7 SECONDS (ref 11.6–14.5)
RBC # BLD AUTO: 3.34 MILLION/UL (ref 3.81–5.12)
RSV RNA RESP QL NAA+PROBE: NEGATIVE
SARS-COV-2 RNA RESP QL NAA+PROBE: NEGATIVE
SODIUM SERPL-SCNC: 141 MMOL/L (ref 136–145)
WBC # BLD AUTO: 2.85 THOUSAND/UL (ref 4.31–10.16)

## 2020-12-22 PROCEDURE — 0241U HB NFCT DS VIR RESP RNA 4 TRGT: CPT | Performed by: EMERGENCY MEDICINE

## 2020-12-22 PROCEDURE — 36415 COLL VENOUS BLD VENIPUNCTURE: CPT | Performed by: EMERGENCY MEDICINE

## 2020-12-22 PROCEDURE — 96360 HYDRATION IV INFUSION INIT: CPT

## 2020-12-22 PROCEDURE — 99285 EMERGENCY DEPT VISIT HI MDM: CPT

## 2020-12-22 PROCEDURE — 85610 PROTHROMBIN TIME: CPT | Performed by: EMERGENCY MEDICINE

## 2020-12-22 PROCEDURE — 76705 ECHO EXAM OF ABDOMEN: CPT

## 2020-12-22 PROCEDURE — 99285 EMERGENCY DEPT VISIT HI MDM: CPT | Performed by: EMERGENCY MEDICINE

## 2020-12-22 PROCEDURE — 85018 HEMOGLOBIN: CPT | Performed by: INTERNAL MEDICINE

## 2020-12-22 PROCEDURE — 82948 REAGENT STRIP/BLOOD GLUCOSE: CPT

## 2020-12-22 PROCEDURE — 99223 1ST HOSP IP/OBS HIGH 75: CPT | Performed by: INTERNAL MEDICINE

## 2020-12-22 PROCEDURE — 80053 COMPREHEN METABOLIC PANEL: CPT | Performed by: EMERGENCY MEDICINE

## 2020-12-22 PROCEDURE — 85025 COMPLETE CBC W/AUTO DIFF WBC: CPT | Performed by: EMERGENCY MEDICINE

## 2020-12-22 PROCEDURE — 85014 HEMATOCRIT: CPT | Performed by: INTERNAL MEDICINE

## 2020-12-22 RX ORDER — GABAPENTIN 300 MG/1
300 CAPSULE ORAL 3 TIMES DAILY
Status: DISCONTINUED | OUTPATIENT
Start: 2020-12-22 | End: 2020-12-24 | Stop reason: HOSPADM

## 2020-12-22 RX ORDER — SODIUM CHLORIDE 9 MG/ML
100 INJECTION, SOLUTION INTRAVENOUS CONTINUOUS
Status: DISPENSED | OUTPATIENT
Start: 2020-12-22 | End: 2020-12-23

## 2020-12-22 RX ORDER — HYDRALAZINE HYDROCHLORIDE 25 MG/1
75 TABLET, FILM COATED ORAL 3 TIMES DAILY
COMMUNITY
Start: 2020-11-09

## 2020-12-22 RX ORDER — FUROSEMIDE 20 MG/1
20 TABLET ORAL EVERY 24 HOURS
COMMUNITY
Start: 2020-11-18 | End: 2020-12-24 | Stop reason: HOSPADM

## 2020-12-22 RX ORDER — FOLIC ACID 1 MG/1
1 TABLET ORAL DAILY
Status: DISCONTINUED | OUTPATIENT
Start: 2020-12-22 | End: 2020-12-22

## 2020-12-22 RX ORDER — HYDRALAZINE HYDROCHLORIDE 20 MG/ML
5 INJECTION INTRAMUSCULAR; INTRAVENOUS EVERY 6 HOURS PRN
Status: DISCONTINUED | OUTPATIENT
Start: 2020-12-22 | End: 2020-12-24 | Stop reason: HOSPADM

## 2020-12-22 RX ORDER — NYSTATIN 100000 [USP'U]/G
POWDER TOPICAL 2 TIMES DAILY
Status: DISCONTINUED | OUTPATIENT
Start: 2020-12-22 | End: 2020-12-24 | Stop reason: HOSPADM

## 2020-12-22 RX ORDER — ESCITALOPRAM OXALATE 10 MG/1
10 TABLET ORAL DAILY
Status: DISCONTINUED | OUTPATIENT
Start: 2020-12-22 | End: 2020-12-24 | Stop reason: HOSPADM

## 2020-12-22 RX ORDER — HYDRALAZINE HYDROCHLORIDE 25 MG/1
75 TABLET, FILM COATED ORAL 3 TIMES DAILY
Status: DISCONTINUED | OUTPATIENT
Start: 2020-12-22 | End: 2020-12-24 | Stop reason: HOSPADM

## 2020-12-22 RX ORDER — THIAMINE MONONITRATE (VIT B1) 100 MG
100 TABLET ORAL DAILY
Status: DISCONTINUED | OUTPATIENT
Start: 2020-12-22 | End: 2020-12-22

## 2020-12-22 RX ORDER — ACETAMINOPHEN 325 MG/1
650 TABLET ORAL EVERY 6 HOURS PRN
Status: DISCONTINUED | OUTPATIENT
Start: 2020-12-22 | End: 2020-12-24 | Stop reason: HOSPADM

## 2020-12-22 RX ORDER — NICOTINE 21 MG/24HR
14 PATCH, TRANSDERMAL 24 HOURS TRANSDERMAL ONCE
Status: DISCONTINUED | OUTPATIENT
Start: 2020-12-22 | End: 2020-12-22

## 2020-12-22 RX ADMIN — GABAPENTIN 300 MG: 300 CAPSULE ORAL at 21:51

## 2020-12-22 RX ADMIN — SODIUM CHLORIDE 1000 ML: 0.9 INJECTION, SOLUTION INTRAVENOUS at 14:09

## 2020-12-22 RX ADMIN — METOPROLOL TARTRATE 75 MG: 50 TABLET, FILM COATED ORAL at 21:51

## 2020-12-22 RX ADMIN — ESCITALOPRAM 10 MG: 10 TABLET, FILM COATED ORAL at 16:11

## 2020-12-22 RX ADMIN — GABAPENTIN 300 MG: 300 CAPSULE ORAL at 16:08

## 2020-12-22 RX ADMIN — HYDRALAZINE HYDROCHLORIDE 75 MG: 25 TABLET, FILM COATED ORAL at 21:51

## 2020-12-22 RX ADMIN — HYDRALAZINE HYDROCHLORIDE 75 MG: 25 TABLET, FILM COATED ORAL at 16:08

## 2020-12-22 RX ADMIN — SODIUM CHLORIDE 100 ML/HR: 0.9 INJECTION, SOLUTION INTRAVENOUS at 16:09

## 2020-12-22 NOTE — ED PROVIDER NOTES
History  Chief Complaint   Patient presents with    Rectal Bleeding     per EMS, patient from [facility] and reports multiple episodes of loose stools for 2 days with bright red blood, pt denies abd pain     HPI     51-year-old female with history of type 2 diabetes, hypertension, iron deficiency anemia, prior stroke, presenting for evaluation of several days of loose stools, with bright red blood seen in her stool this morning  She denies knowledge of any recent vaginal bleeding  Denies problems urinating or seeing blood in her urine  She denies diarrhea, abdominal pain, nausea, or vomiting, and states that she feels completely normal   She takes aspirin daily but is not on anticoagulation  States that she had hemorrhoids many years ago that were removed and denies pain with bowel movements recently  I spoke with Grace Schaffer, nurse at the patient's nursing facility, who states the patient was tested for C diff a few days ago and tested negative  She describes seeing a large amount of bright red blood in the toilet after the patient had a bowel movement this morning  Prior to Admission Medications   Prescriptions Last Dose Informant Patient Reported? Taking?    Melatonin 3 MG CAPS 12/21/2020 at Unknown time  Yes Yes   Sig: Take 3 mg by mouth daily at bedtime   Menthol-Methyl Salicylate ([employer] MUSCLE RUB) 10-15 % CREA 12/22/2020 at Unknown time  Yes Yes   Sig: Apply 1 application topically 3 (three) times a day Apply to shoulders and neck   acetaminophen (TYLENOL) 325 mg tablet 12/21/2020 at Unknown time  Yes Yes   Sig: Take 650 mg by mouth every 4 (four) hours as needed   albuterol (2 5 mg/3 mL) 0 083 % nebulizer solution Past Week at Unknown time  Yes Yes   Sig: Take 2 5 mg by nebulization every 4 (four) hours as needed   ascorbic acid (VITAMIN C) 500 MG tablet 12/22/2020 at Unknown time  Yes Yes   Sig: Take 500 mg by mouth daily   aspirin (ECOTRIN LOW STRENGTH) 81 mg EC tablet 12/22/2020 at Unknown time  Yes Yes   Sig: Take 81 mg by mouth daily   escitalopram (LEXAPRO) 10 mg tablet 12/22/2020 at Unknown time  Yes Yes   Sig: Take 10 mg by mouth daily   famciclovir (FAMVIR) 500 mg tablet   Yes No   Sig: Take 500 mg by mouth 3 (three) times a day   ferrous sulfate 325 (65 Fe) mg tablet 12/22/2020 at Unknown time  Yes Yes   Sig: Take 325 mg by mouth daily   furosemide (Lasix) 20 mg tablet 12/21/2020 at Unknown time  Yes Yes   Sig: Take 20 mg by mouth every 24 hours   gabapentin (NEURONTIN) 600 MG tablet 12/21/2020 at Unknown time  Yes Yes   Sig: Take 600 mg by mouth 3 (three) times a day   glucagon 1 MG injection Past Week at Unknown time  Yes Yes   Sig: Inject 1 mg into a muscle once as needed   guaiFENesin (ROBITUSSIN) 100 MG/5ML oral liquid Past Week at Unknown time  Yes Yes   Sig: Take 10 mL by mouth every 4 (four) hours as needed   hydrALAZINE (APRESOLINE) 25 mg tablet 12/22/2020 at Unknown time  Yes Yes   Sig: Take 75 mg by mouth 3 (three) times a day   insulin aspart (NovoLOG) 100 units/mL injection 12/22/2020 at Unknown time  Yes Yes   Sig: Inject 15 Units under the skin 3 (three) times a day before meals   insulin glargine (LANTUS SOLOSTAR) 100 units/mL injection pen 12/22/2020 at Unknown time  Yes Yes   Sig: Inject 34 Units under the skin 2 (two) times a day Every morning and at bedtime   linaGLIPtin 5 MG TABS 12/21/2020 at Unknown time  Yes Yes   Sig: Take 5 mg by mouth daily   lisinopril (ZESTRIL) 10 mg tablet 12/22/2020 at Unknown time  Yes Yes   Sig: Take 20 mg by mouth daily    metFORMIN (GLUCOPHAGE) 500 mg tablet 12/22/2020 at Unknown time  Yes Yes   Sig: Take 1,000 mg by mouth 2 (two) times a day with meals   metoprolol tartrate (LOPRESSOR) 50 mg tablet 12/22/2020 at Unknown time  Yes Yes   Sig: Take 75 mg by mouth every 12 (twelve) hours   polyethylene glycol (MIRALAX) 17 g packet 12/21/2020 at Unknown time  Yes Yes   Sig: Take 17 g by mouth daily as needed   polyvinyl alcohol (LIQUIFILM TEARS) 1 4 % ophthalmic solution 12/22/2020 at Unknown time  Yes Yes   Sig: Administer 1 drop to both eyes as needed   tamsulosin (FLOMAX) 0 4 mg 12/22/2020 at Unknown time  Yes Yes   Sig: Take 2 capsules by mouth daily      Facility-Administered Medications: None       Past Medical History:   Diagnosis Date    AV block     Benign neoplasm of pituitary gland ([facility] ) 10/2/2013    CVA (cerebral vascular accident) ([place] [id number] )     Depression     Diabetes mellitus ([place] [address] )     Hearing loss     Hyperlipidemia     Neuropathy     Stenosis of carotid artery        Past Surgical History:   Procedure Laterality Date    JOINT REPLACEMENT      right knee       Family History   Problem Relation Age of Onset    No Known Problems Mother     No Known Problems Father      I have reviewed and agree with the history as documented  E-Cigarette/Vaping    E-Cigarette Use Never User      E-Cigarette/Vaping Substances    Nicotine No     THC No     CBD No     Flavoring No     Other No     Unknown No      Social History     Tobacco Use    Smoking status: Former Smoker    Smokeless tobacco: Never Used   Substance Use Topics    Alcohol use: Not Currently     Frequency: Never     Drinks per session: Patient refused     Binge frequency: Never    Drug use: Never       Review of Systems   Constitutional: Negative for chills and fever  HENT: Negative for congestion  Eyes: Negative for visual disturbance  Respiratory: Negative for cough and shortness of breath  Cardiovascular: Negative for chest pain and leg swelling  Gastrointestinal: Positive for blood in stool  Negative for abdominal pain, diarrhea, nausea and vomiting  Genitourinary: Negative for dysuria and frequency  Musculoskeletal: Negative for arthralgias, back pain, neck pain and neck stiffness  Skin: Negative for rash  Neurological: Negative for weakness, numbness and headaches  Psychiatric/Behavioral: Negative for agitation, behavioral problems and confusion  Physical Exam  Physical Exam  Constitutional:       General: She is not in acute distress  Appearance: She is well-developed  She is not diaphoretic  HENT:      Head: Normocephalic and atraumatic  Right Ear: External ear normal       Left Ear: External ear normal       Nose: Nose normal    Eyes:      Conjunctiva/sclera: Conjunctivae normal    Neck:      Musculoskeletal: Normal range of motion and neck supple  Cardiovascular:      Rate and Rhythm: Normal rate and regular rhythm  Heart sounds: Normal heart sounds  No murmur  No friction rub  No gallop  Pulmonary:      Effort: Pulmonary effort is normal  No respiratory distress  Breath sounds: Normal breath sounds  No wheezing or rales  Abdominal:      General: Bowel sounds are normal  There is no distension  Palpations: Abdomen is soft  Tenderness: There is no abdominal tenderness  There is no guarding  Genitourinary:     Comments: Stage 2 sacral decubitus ulcer with mild surrounding erythema, does not appear superinfected  Rectal exam with hemoccult positive dark brown stool in the rectal vault, no gross blood, no hemorrhoids  External exam of the genitalia performed without blood at the vaginal orifice  Musculoskeletal: Normal range of motion  General: No deformity  Skin:     General: Skin is warm and dry  Neurological:      Mental Status: She is alert and oriented to person, place, and time  Motor: No abnormal muscle tone           Vital Signs  ED Triage Vitals   Temperature Pulse Respirations Blood Pressure SpO2   12/22/20 1151 12/22/20 1130 12/22/20 1130 12/22/20 1130 12/22/20 1130   97 6 °F (36 4 °C) 65 18 (!) 192/84 98 %      Temp Source Heart Rate Source Patient Position - Orthostatic VS BP Location FiO2 (%)   12/22/20 1151 12/22/20 1130 12/22/20 1130 12/22/20 1130 --   Oral Monitor Lying Right arm       Pain Score       12/22/20 1130       No Pain           Vitals:    12/22/20 1400 12/22/20 1415 12/22/20 1608 12/22/20 1700   BP:   132/55 132/55   Pulse: 66 68  66   Patient Position - Orthostatic VS:    Lying         Visual Acuity      ED Medications  Medications   hydrALAZINE (APRESOLINE) injection 5 mg (has no administration in time range)   insulin lispro (HumaLOG) 100 units/mL subcutaneous injection 1-6 Units (1 Units Subcutaneous Not Given 12/22/20 1612)   insulin lispro (HumaLOG) 100 units/mL subcutaneous injection 1-5 Units (has no administration in time range)   sodium chloride 0 9 % infusion (100 mL/hr Intravenous New Bag 12/22/20 1609)   acetaminophen (TYLENOL) tablet 650 mg (has no administration in time range)   escitalopram (LEXAPRO) tablet 10 mg (10 mg Oral Given 12/22/20 1611)   gabapentin (NEURONTIN) capsule 300 mg (300 mg Oral Given 12/22/20 1608)   hydrALAZINE (APRESOLINE) tablet 75 mg (75 mg Oral Given 12/22/20 1608)   metoprolol tartrate (LOPRESSOR) tablet 75 mg (has no administration in time range)   sodium chloride 0 9 % bolus 1,000 mL (1,000 mL Intravenous New Bag 12/22/20 1409)       Diagnostic Studies  Results Reviewed     Procedure Component Value Units Date/Time    Urinalysis with microscopic [[id number]]     Lab Status: No result Specimen: Urine, Clean Catch     COVID19, Influenza A/B, RSV PCR, [facility] [[id number]]  (Normal) Collected: 12/22/20 1318    Lab Status: Final result Specimen: Nares from Nasopharyngeal Swab Updated: 12/22/20 1408     SARS-CoV-2 Negative     INFLUENZA A PCR Negative     INFLUENZA B PCR Negative     RSV PCR Negative    Narrative: This test has been authorized by FDA under an EUA (Emergency Use Assay) for use by authorized laboratories  Clinical caution and judgement should be used with the interpretation of these results with consideration of the clinical impression and other laboratory testing  Testing reported as "Positive" or "Negative" has been proven to be accurate according to standard laboratory validation requirements    All testing is performed with control materials showing appropriate reactivity at standard intervals      Comprehensive metabolic panel [[id number]]  (Abnormal) Collected: 12/22/20 1158    Lab Status: Final result Specimen: Blood from Arm, Left Updated: 12/22/20 1245     Sodium 141 mmol/L      Potassium 5 3 mmol/L      Chloride 107 mmol/L      CO2 25 mmol/L      ANION GAP 9 mmol/L      BUN 44 mg/dL      Creatinine 1 70 mg/dL      Glucose 91 mg/dL      Calcium 9 1 mg/dL      AST 19 U/L      ALT 25 U/L      Alkaline Phosphatase 87 U/L      Total Protein 7 8 g/dL      Albumin 3 5 g/dL      Total Bilirubin 0 40 mg/dL      eGFR 29 ml/min/1 73sq m     Narrative:      National Kidney Disease Foundation guidelines for Chronic Kidney Disease (CKD):     Stage 1 with normal or high GFR (GFR > 90 mL/min/1 73 square meters)    Stage 2 Mild CKD (GFR = 60-89 mL/min/1 73 square meters)    Stage 3A Moderate CKD (GFR = 45-59 mL/min/1 73 square meters)    Stage 3B Moderate CKD (GFR = 30-44 mL/min/1 73 square meters)    Stage 4 Severe CKD (GFR = 15-29 mL/min/1 73 square meters)    Stage 5 End Stage CKD (GFR <15 mL/min/1 73 square meters)  Note: GFR calculation is accurate only with a steady state creatinine    CBC and differential [[id number]]  (Abnormal) Collected: 12/22/20 1158    Lab Status: Final result Specimen: Blood from Arm, Left Updated: 12/22/20 1230     WBC 2 85 Thousand/uL      RBC 3 34 Million/uL      Hemoglobin 8 7 g/dL      Hematocrit 29 8 %      MCV 89 fL      MCH 26 0 pg      MCHC 29 2 g/dL      RDW 17 6 %      MPV 9 9 fL      Platelets 87 Thousands/uL      nRBC 0 /100 WBCs      Neutrophils Relative 59 %      Immat GRANS % 0 %      Lymphocytes Relative 25 %      Monocytes Relative 13 %      Eosinophils Relative 2 %      Basophils Relative 1 %      Neutrophils Absolute 1 69 Thousands/µL      Immature Grans Absolute 0 01 Thousand/uL      Lymphocytes Absolute 0 70 Thousands/µL      Monocytes Absolute 0 37 Thousand/µL      Eosinophils Absolute 0 06 Thousand/µL      Basophils Absolute 0 02 Thousands/µL     Protime-INR [[id number]]  (Abnormal) Collected: 12/22/20 1158    Lab Status: Final result Specimen: Blood from Arm, Left Updated: 12/22/20 1224     Protime 15 7 seconds      INR 1 23                 US right upper quadrant    (Results Pending)              Procedures  Procedures         ED Course                             SBIRT 20yo+      Most Recent Value   SBIRT (24 yo +)   In order to provide better care to our patients, we are screening all of our patients for alcohol and drug use  Would it be okay to ask you these screening questions? Yes Filed at: 12/22/2020 1132   Initial Alcohol Screen: US AUDIT-C    1  How often do you have a drink containing alcohol?  0 Filed at: 12/22/2020 1132   2  How many drinks containing alcohol do you have on a typical day you are drinking? 0 Filed at: 12/22/2020 1132   3a  Male UNDER 65: How often do you have five or more drinks on one occasion? 0 Filed at: 12/22/2020 1132   3b  FEMALE Any Age, or MALE 65+: How often do you have 4 or more drinks on one occassion? 0 Filed at: 12/22/2020 1132   Audit-C Score  0 Filed at: 12/22/2020 1132   TENA: How many times in the past year have you    Used an illegal drug or used a prescription medication for non-medical reasons? Never Filed at: 12/22/2020 1132                    MDM  Number of Diagnoses or Management Options  NEHEMIAS (acute kidney injury) [facility]): new and requires workup  Pancytopenia [facility]): new and requires workup  Rectal bleeding: new and requires workup  Diagnosis management comments: Afebrile and hemodynamically stable  Abdomen benign to deep palpation  Rectal exam without hemorrhoids or fissures, hemoccult positive dark brown stool in the rectal vault  Hemoglobin of 8 7 which is actually improved from prior, but patient is newly pancytopenic  For this reason, she was tested for COVID which is negative    She has no respiratory complaints or symptoms that are consistent with COVID-19  C diff testing ordered, though the patient did not have a bowel movement in the emergency department  She reportedly tested negative for C diff a few days ago at her skilled nursing facility  Patient is found to have an NEHEMIAS with creatinine of 1 7 from 1 14 on November 30th (previous lab values obtained via phone from the pt's SNF)  Will admit for NEHEMIAS and presumed lower GI bleed  Amount and/or Complexity of Data Reviewed  Clinical lab tests: ordered and reviewed  Obtain history from someone other than the patient: yes  Review and summarize past medical records: yes  Discuss the patient with other providers: yes  Independent visualization of images, tracings, or specimens: yes    Patient Progress  Patient progress: stable           Disposition  Final diagnoses:   Rectal bleeding   NEHEMIAS (acute kidney injury) ([facility] )   Pancytopenia ([facility] )     Time reflects when diagnosis was documented in both MDM as applicable and the Disposition within this note     Time User Action Codes Description Comment    12/22/2020  2:46 PM Ephriam Milks Add [K62 5] Rectal bleeding     12/22/2020  2:46 PM Ephriam Milks Add [N17 9] NEHEMIAS (acute kidney injury) ([facility] )     12/22/2020  2:47 PM Ephriam Milks Add [X12 853] Pancytopenia [facility])       ED Disposition     ED Disposition Condition Date/Time Comment    Admit Stable Tue Dec 22, 2020  2:46 PM Case was discussed with RHONA and the patient's admission status was agreed to be Admission Status: inpatient status to the service of Dr Mayda Mark          Follow-up Information    None         Current Discharge Medication List      CONTINUE these medications which have NOT CHANGED    Details   acetaminophen (TYLENOL) 325 mg tablet Take 650 mg by mouth every 4 (four) hours as needed      albuterol (2 5 mg/3 mL) 0 083 % nebulizer solution Take 2 5 mg by nebulization every 4 (four) hours as needed      ascorbic acid (VITAMIN C) 500 MG tablet Take 500 mg by mouth daily aspirin (ECOTRIN LOW STRENGTH) 81 mg EC tablet Take 81 mg by mouth daily      escitalopram (LEXAPRO) 10 mg tablet Take 10 mg by mouth daily      ferrous sulfate 325 (65 Fe) mg tablet Take 325 mg by mouth daily      furosemide (Lasix) 20 mg tablet Take 20 mg by mouth every 24 hours      gabapentin (NEURONTIN) 600 MG tablet Take 600 mg by mouth 3 (three) times a day      glucagon 1 MG injection Inject 1 mg into a muscle once as needed      guaiFENesin (ROBITUSSIN) 100 MG/5ML oral liquid Take 10 mL by mouth every 4 (four) hours as needed      hydrALAZINE (APRESOLINE) 25 mg tablet Take 75 mg by mouth 3 (three) times a day      insulin aspart (NovoLOG) 100 units/mL injection Inject 15 Units under the skin 3 (three) times a day before meals      insulin glargine (LANTUS SOLOSTAR) 100 units/mL injection pen Inject 34 Units under the skin 2 (two) times a day Every morning and at bedtime      linaGLIPtin 5 MG TABS Take 5 mg by mouth daily      lisinopril (ZESTRIL) 10 mg tablet Take 20 mg by mouth daily       Melatonin 3 MG CAPS Take 3 mg by mouth daily at bedtime      Menthol-Methyl Salicylate ([employer] MUSCLE RUB) 10-15 % CREA Apply 1 application topically 3 (three) times a day Apply to shoulders and neck      metFORMIN (GLUCOPHAGE) 500 mg tablet Take 1,000 mg by mouth 2 (two) times a day with meals      metoprolol tartrate (LOPRESSOR) 50 mg tablet Take 75 mg by mouth every 12 (twelve) hours      polyethylene glycol (MIRALAX) 17 g packet Take 17 g by mouth daily as needed      polyvinyl alcohol (LIQUIFILM TEARS) 1 4 % ophthalmic solution Administer 1 drop to both eyes as needed      tamsulosin (FLOMAX) 0 4 mg Take 2 capsules by mouth daily      famciclovir (FAMVIR) 500 mg tablet Take 500 mg by mouth 3 (three) times a day           No discharge procedures on file      PDMP Review     None          ED Provider  Electronically Signed by           Kristan Contreras MD  12/22/20 [id number]

## 2020-12-22 NOTE — ASSESSMENT & PLAN NOTE
BP currently elevated  Continue home beta-blocker and hydralazine  Hold home lisinopril and Lasix given a KI  IV hydralazine p r n   For systolic BP greater than 706

## 2020-12-22 NOTE — H&P
H&P- Evelyn Hsieh 1945, 76 y o  female MRN: [id number]    Unit/Bed#: ED 19 Encounter: [id number]    Primary Care Provider: Nory Craig MD   Date and time admitted to hospital: 12/22/2020 11:24 AM        * Hematochezia  Assessment & Plan  Patient reports has been having bright red bloody bowel movements for the last 2 days  Denies melena, hematemesis  Hemoglobin currently at baseline between 8 and 9  Will continue to monitor hemoglobin  Monitor for further hematochezia  Will make NPO at midnight just in case procedure is needed in the setting of hemoglobin drop or further bleeding    NEHEMIAS (acute kidney injury) ([facility] )  Assessment & Plan  Baseline creatinine approximately 1 1  Presented with a creatinine 1 7  Possibly secondary to volume depletion  Will provide IVF repeat BMP in the morning    Pure hypercholesterolemia  Assessment & Plan  Continue home Lipitor    Pancytopenia ([facility] )  Assessment & Plan  Possibly secondary to cirrhosis  Will check right upper quadrant ultrasound    History of CVA (cerebrovascular accident)  Assessment & Plan  Continue home aspirin, statin    Hypertension  Assessment & Plan  BP currently elevated  Continue home beta-blocker and hydralazine  Hold home lisinopril and Lasix given a KI  IV hydralazine p r n  For systolic BP greater than 658      Type 2 diabetes mellitus ([facility] )  Assessment & Plan  Lab Results   Component Value Date    HGBA1C 5 5 06/04/2020       No results for input(s): POCGLU in the last 72 hours  Blood Sugar Average: Last 72 hrs:   continue home glargine and short-acting insulin t i d   Hold home p o   Medications  Sliding scale insulin    Recurrent major depressive disorder (HCC)  Assessment & Plan  Continue home Lexapro        VTE Prophylaxis: Pharmacologic VTE Prophylaxis contraindicated due to acute bleed  / sequential compression device   Code Status: full code  POLST: There is no POLST form on file for this patient (pre-hospital)  Discussion with family: pt    Anticipated Length of Stay:  Patient will be admitted on an Inpatient basis with an anticipated length of stay of  > 2 midnights  Justification for Hospital Stay:  Hematochezia    Total Time for Visit, including Counseling / Coordination of Care: 1 hour  Greater than 50% of this total time spent on direct patient counseling and coordination of care  Chief Complaint:   Rectal bleeding    History of Present Illness:    Enolia Bloch is a 76 y o  female with past medical history significant of prior stroke, iron deficiency anemia, hypertension, type 2 diabetes who initially presented with bright red blood per rectum  Patient reports over the past 2 days has noted blood with bowel movements which is mixed with stool  Denies any melena, hematemesis, abdominal pain, nausea, vomiting, fevers, chills, cough, chest pain or any other symptoms at this time  Review of Systems:    Review of Systems    Past Medical and Surgical History:     Past Medical History:   Diagnosis Date    AV block     [facility] 10/2/2013    [facility]ce[facility] vascular accident) ([facility] )     Depression     Diabetes mellitus ([facility] )     Hearing loss     Hyperlipidemia     Neuropathy     Stenosis of carotid artery        Past Surgical History:   Procedure Laterality Date    JOINT REPLACEMENT      right knee       Meds/Allergies:    Prior to Admission medications    Medication Sig Start Date End Date Taking?  Authorizing Provider   acetaminophen (TYLENOL) 325 mg tablet Take 650 mg by mouth every 4 (four) hours as needed   Yes Historical Provider, MD   albuterol (2 5 mg/3 mL) 0 083 % nebulizer solution Take 2 5 mg by nebulization every 4 (four) hours as needed   Yes Historical Provider, MD   ascorbic acid (VITAMIN C) 500 MG tablet Take 500 mg by mouth daily   Yes Historical Provider, MD   aspirin (ECOTRIN LOW STRENGTH) 81 mg EC tablet Take 81 mg by mouth daily   Yes Historical Provider, MD   escitalopram (LEXAPRO) 10 mg tablet Take 10 mg by mouth daily   Yes Historical Provider, MD   ferrous sulfate 325 (65 Fe) mg tablet Take 325 mg by mouth daily   Yes Historical Provider, MD   furosemide (Lasix) 20 mg tablet Take 20 mg by mouth every 24 hours 11/18/20  Yes Historical Provider, MD   gabapentin (NEURONTIN) 600 MG tablet Take 600 mg by mouth 3 (three) times a day   Yes Historical Provider, MD   glucagon 1 MG injection Inject 1 mg into a muscle once as needed   Yes Historical Provider, MD   guaiFENesin (ROBITUSSIN) 100 MG/5ML oral liquid Take 10 mL by mouth every 4 (four) hours as needed   Yes Historical Provider, MD   hydrALAZINE (APRESOLINE) 25 mg tablet Take 75 mg by mouth 3 (three) times a day 11/9/20  Yes Historical Provider, MD   insulin aspart (NovoLOG) 100 units/mL injection Inject 15 Units under the skin 3 (three) times a day before meals   Yes Historical Provider, MD   insulin glargine (LANTUS SOLOSTAR) 100 units/mL injection pen Inject 34 Units under the skin 2 (two) times a day Every morning and at bedtime   Yes Historical Provider, MD   linaGLIPtin 5 MG TABS Take 5 mg by mouth daily   Yes Historical Provider, MD   lisinopril (ZESTRIL) 10 mg tablet Take 20 mg by mouth daily    Yes Historical Provider, MD   Melatonin 3 MG CAPS Take 3 mg by mouth daily at bedtime   Yes Historical Provider, MD   Menthol-Methyl Salicylate ([employer] MUSCLE RUB) 10-15 % CREA Apply 1 application topically 3 (three) times a day Apply to shoulders and neck   Yes Historical Provider, MD   metFORMIN (GLUCOPHAGE) 500 mg tablet Take 1,000 mg by mouth 2 (two) times a day with meals   Yes Historical Provider, MD   metoprolol tartrate (LOPRESSOR) 50 mg tablet Take 75 mg by mouth every 12 (twelve) hours   Yes Historical Provider, MD   polyethylene glycol (MIRALAX) 17 g packet Take 17 g by mouth daily as needed   Yes Historical Provider, MD   polyvinyl alcohol (LIQUIFILM TEARS) 1 4 % ophthalmic solution Administer 1 drop to both eyes as needed Yes Historical Provider, MD   tamsulosin (FLOMAX) 0 4 mg Take 2 capsules by mouth daily   Yes Historical Provider, MD   famciclovir (FAMVIR) 500 mg tablet Take 500 mg by mouth 3 (three) times a day  4/12/20  Historical Provider, MD     I have reviewed home medications with patient personally  Allergies: No Known Allergies    Social History:     Marital Status:      Patient Pre-hospital Living Situation: SNF  Patient Pre-hospital Level of Mobility: with assistance  Patient Pre-hospital Diet Restrictions: diabetic  Substance Use History:   Social History     Substance and Sexual Activity   Alcohol Use Not Currently    Frequency: Never    Drinks per session: Patient refused    Binge frequency: Never     Social History     Tobacco Use   Smoking Status Former Smoker   Smokeless Tobacco Never Used     Social History     Substance and Sexual Activity   Drug Use Never       Family History:    History reviewed  No pertinent family history  Physical Exam:     Vitals:   Blood Pressure: (!) 169/111 (12/22/20 1145)  Pulse: 68 (12/22/20 1415)  Temperature: 97 6 °F (36 4 °C) (12/22/20 1151)  Temp Source: Oral (12/22/20 1151)  Respirations: 18 (12/22/20 1130)  Height: 5' 2" (157 5 cm) (12/22/20 1130)  Weight - Scale: 101 kg (222 lb 3 6 oz) (12/22/20 1130)  SpO2: 99 % (12/22/20 1415)    Physical Exam  Constitutional:       General: She is not in acute distress  Appearance: She is well-developed  She is not diaphoretic  HENT:      Head: Normocephalic and atraumatic  Nose: Nose normal       Mouth/Throat:      Pharynx: No oropharyngeal exudate  Eyes:      General: No scleral icterus  Right eye: No discharge  Left eye: No discharge  Conjunctiva/sclera: Conjunctivae normal    Neck:      Musculoskeletal: Normal range of motion and neck supple  Thyroid: No thyromegaly  Vascular: No JVD  Cardiovascular:      Rate and Rhythm: Normal rate and regular rhythm        Heart sounds: Normal heart sounds  No murmur  No friction rub  No gallop  Pulmonary:      Effort: Pulmonary effort is normal  No respiratory distress  Breath sounds: Normal breath sounds  No wheezing or rales  Chest:      Chest wall: No tenderness  Abdominal:      General: Bowel sounds are normal  There is no distension  Palpations: Abdomen is soft  Tenderness: There is no abdominal tenderness  There is no guarding or rebound  Musculoskeletal: Normal range of motion  General: No tenderness or deformity  Skin:     General: Skin is warm and dry  Findings: No erythema or rash  Neurological:      Mental Status: She is alert  Mental status is at baseline  Cranial Nerves: No cranial nerve deficit  Sensory: No sensory deficit  Motor: No abnormal muscle tone  Coordination: Coordination normal          (     Additional Data:     Lab Results: I have personally reviewed pertinent reports  Results from last 7 days   Lab Units 12/22/20  1158   WBC Thousand/uL 2 85*   HEMOGLOBIN g/dL 8 7*   HEMATOCRIT % 29 8*   PLATELETS Thousands/uL 87*   NEUTROS PCT % 59   LYMPHS PCT % 25   MONOS PCT % 13*   EOS PCT % 2     Results from last 7 days   Lab Units 12/22/20  1158   SODIUM mmol/L 141   POTASSIUM mmol/L 5 3   CHLORIDE mmol/L 107   CO2 mmol/L 25   BUN mg/dL 44*   CREATININE mg/dL 1 70*   ANION GAP mmol/L 9   CALCIUM mg/dL 9 1   ALBUMIN g/dL 3 5   TOTAL BILIRUBIN mg/dL 0 40   ALK PHOS U/L 87   ALT U/L 25   AST U/L 19   GLUCOSE RANDOM mg/dL 91     Results from last 7 days   Lab Units 12/22/20  1158   INR  1 23*                   Imaging: I have personally reviewed pertinent reports  No orders to display       EKG, Pathology, and Other Studies Reviewed on Admission:   · EKG: reviewed    [facility] Records Reviewed: Yes     ** Please Note: This note has been constructed using a voice recognition system   **

## 2020-12-22 NOTE — ASSESSMENT & PLAN NOTE
Patient reports has been having bright red bloody bowel movements for the last 2 days  Denies melena, hematemesis  Hemoglobin currently at baseline between 8 and 9  Will continue to monitor hemoglobin  Monitor for further hematochezia  Will make NPO at midnight just in case procedure is needed in the setting of hemoglobin drop or further bleeding

## 2020-12-22 NOTE — ASSESSMENT & PLAN NOTE
Baseline creatinine approximately 1 1  Presented with a creatinine 1 7  Possibly secondary to volume depletion  Will provide IVF repeat BMP in the morning

## 2020-12-23 PROBLEM — K72.90 DECOMPENSATED HEPATIC CIRRHOSIS (HCC): Status: ACTIVE | Noted: 2020-04-06

## 2020-12-23 LAB
ALBUMIN SERPL BCP-MCNC: 3.1 G/DL (ref 3.5–5)
ALP SERPL-CCNC: 75 U/L (ref 46–116)
ALT SERPL W P-5'-P-CCNC: 15 U/L (ref 12–78)
ANION GAP SERPL CALCULATED.3IONS-SCNC: 10 MMOL/L (ref 4–13)
AST SERPL W P-5'-P-CCNC: 21 U/L (ref 5–45)
BILIRUB SERPL-MCNC: 0.36 MG/DL (ref 0.2–1)
BUN SERPL-MCNC: 41 MG/DL (ref 5–25)
CALCIUM ALBUM COR SERPL-MCNC: 9.2 MG/DL (ref 8.3–10.1)
CALCIUM SERPL-MCNC: 8.5 MG/DL (ref 8.3–10.1)
CHLORIDE SERPL-SCNC: 109 MMOL/L (ref 100–108)
CO2 SERPL-SCNC: 23 MMOL/L (ref 21–32)
CREAT SERPL-MCNC: 1.42 MG/DL (ref 0.6–1.3)
ERYTHROCYTE [DISTWIDTH] IN BLOOD BY AUTOMATED COUNT: 17.3 % (ref 11.6–15.1)
GFR SERPL CREATININE-BSD FRML MDRD: 36 ML/MIN/1.73SQ M
GLUCOSE SERPL-MCNC: 102 MG/DL (ref 65–140)
GLUCOSE SERPL-MCNC: 103 MG/DL (ref 65–140)
GLUCOSE SERPL-MCNC: 169 MG/DL (ref 65–140)
GLUCOSE SERPL-MCNC: 186 MG/DL (ref 65–140)
GLUCOSE SERPL-MCNC: 96 MG/DL (ref 65–140)
HCT VFR BLD AUTO: 26 % (ref 34.8–46.1)
HCT VFR BLD AUTO: 27.9 % (ref 34.8–46.1)
HGB BLD-MCNC: 7.6 G/DL (ref 11.5–15.4)
HGB BLD-MCNC: 8.2 G/DL (ref 11.5–15.4)
INR PPP: 1.31 (ref 0.84–1.19)
MCH RBC QN AUTO: 25.7 PG (ref 26.8–34.3)
MCHC RBC AUTO-ENTMCNC: 29.2 G/DL (ref 31.4–37.4)
MCV RBC AUTO: 88 FL (ref 82–98)
PLATELET # BLD AUTO: 82 THOUSANDS/UL (ref 149–390)
PMV BLD AUTO: 10.6 FL (ref 8.9–12.7)
POTASSIUM SERPL-SCNC: 5 MMOL/L (ref 3.5–5.3)
PROT SERPL-MCNC: 7 G/DL (ref 6.4–8.2)
PROTHROMBIN TIME: 16.4 SECONDS (ref 11.6–14.5)
RBC # BLD AUTO: 2.96 MILLION/UL (ref 3.81–5.12)
SODIUM SERPL-SCNC: 142 MMOL/L (ref 136–145)
WBC # BLD AUTO: 2.48 THOUSAND/UL (ref 4.31–10.16)

## 2020-12-23 PROCEDURE — 99222 1ST HOSP IP/OBS MODERATE 55: CPT | Performed by: INTERNAL MEDICINE

## 2020-12-23 PROCEDURE — 85027 COMPLETE CBC AUTOMATED: CPT | Performed by: INTERNAL MEDICINE

## 2020-12-23 PROCEDURE — 85018 HEMOGLOBIN: CPT | Performed by: NURSE PRACTITIONER

## 2020-12-23 PROCEDURE — 99233 SBSQ HOSP IP/OBS HIGH 50: CPT | Performed by: NURSE PRACTITIONER

## 2020-12-23 PROCEDURE — 85014 HEMATOCRIT: CPT | Performed by: NURSE PRACTITIONER

## 2020-12-23 PROCEDURE — 80053 COMPREHEN METABOLIC PANEL: CPT | Performed by: INTERNAL MEDICINE

## 2020-12-23 PROCEDURE — 85610 PROTHROMBIN TIME: CPT | Performed by: INTERNAL MEDICINE

## 2020-12-23 PROCEDURE — 82948 REAGENT STRIP/BLOOD GLUCOSE: CPT

## 2020-12-23 RX ADMIN — SODIUM CHLORIDE 100 ML/HR: 0.9 INJECTION, SOLUTION INTRAVENOUS at 01:36

## 2020-12-23 RX ADMIN — GABAPENTIN 300 MG: 300 CAPSULE ORAL at 23:17

## 2020-12-23 RX ADMIN — INSULIN LISPRO 1 UNITS: 100 INJECTION, SOLUTION INTRAVENOUS; SUBCUTANEOUS at 18:17

## 2020-12-23 RX ADMIN — NYSTATIN: 100000 POWDER TOPICAL at 08:37

## 2020-12-23 RX ADMIN — METOPROLOL TARTRATE 75 MG: 50 TABLET, FILM COATED ORAL at 23:16

## 2020-12-23 RX ADMIN — INSULIN LISPRO 1 UNITS: 100 INJECTION, SOLUTION INTRAVENOUS; SUBCUTANEOUS at 23:17

## 2020-12-23 RX ADMIN — HYDRALAZINE HYDROCHLORIDE 75 MG: 25 TABLET, FILM COATED ORAL at 18:17

## 2020-12-23 RX ADMIN — NYSTATIN: 100000 POWDER TOPICAL at 18:25

## 2020-12-23 RX ADMIN — GABAPENTIN 300 MG: 300 CAPSULE ORAL at 18:15

## 2020-12-23 RX ADMIN — HYDRALAZINE HYDROCHLORIDE 75 MG: 25 TABLET, FILM COATED ORAL at 23:16

## 2020-12-23 NOTE — PLAN OF CARE
Problem: Potential for Falls  Goal: Patient will remain free of falls  Description: INTERVENTIONS:  - Assess patient frequently for physical needs  -  Identify cognitive and physical deficits and behaviors that affect risk of falls    -  [place] fall precautions as indicated by assessment   - Educate patient/family on patient safety including physical limitations  - Instruct patient to call for assistance with activity based on assessment  - Modify environment to reduce risk of injury  - Consider OT/PT consult to assist with strengthening/mobility  Outcome: Progressing     Problem: Prexisting or High Potential for Compromised Skin Integrity  Goal: Skin integrity is maintained or improved  Description: INTERVENTIONS:  - Identify patients at risk for skin breakdown  - Assess and monitor skin integrity  - Assess and monitor nutrition and hydration status  - Monitor labs   - Assess for incontinence   - Turn and reposition patient  - Assist with mobility/ambulation  - Relieve pressure over bony prominences  - Avoid friction and shearing  - Provide appropriate hygiene as needed including keeping skin clean and dry  - Evaluate need for skin moisturizer/barrier cream  - Collaborate with interdisciplinary team   - Patient/family teaching  - Consider wound care consult   Outcome: Progressing

## 2020-12-23 NOTE — ASSESSMENT & PLAN NOTE
· Baseline creatinine approximately 1 1  · Presented with a creatinine 1 7 and today 1 42 today   · Possibly secondary to volume depletion  · Continue to trend  · Resume diuretics when NEHEMIAS resolved

## 2020-12-23 NOTE — PROGRESS NOTES
[facility]   Progress Note - Branden Renteria 1945, 76 y o  female MRN: [id number]    Unit/Bed#: S -02 Encounter: [id number]    Primary Care Provider: Michell Carrel, MD   Date and time admitted to hospital: 12/22/2020 11:24 AM        * Hematochezia  Assessment & Plan  · Patient reports has been having bright red bloody bowel movements for the last 2 days  AM BM no blood noted  · Hemoglobin currently 7 6  Baseline 8-8 5  Suspect anemia of chronic disease 2/2 to liver disease   · Will continue to monitor hemoglobin  · Monitor for further hematochezia  · GI following  · Outpatient EGD and colonoscopy and GI follow up     NEHEMIAS (acute kidney injury) ([facility] )  Assessment & Plan  · Baseline creatinine approximately 1 1  · Presented with a creatinine 1 7 and today 1 42 today   · Possibly secondary to volume depletion  · Continue to trend  · Resume diuretics when NEHEMIAS resolved    Decompensated hepatic cirrhosis ([facility] )  Assessment & Plan  · Upper quadrant US- Cirrhotic appearance of the liver with moderate perihepatic ascites  · IR consulted for paracentesis  · Will need outpatient follow up with GI for management     Pancytopenia ([facility] )  Assessment & Plan  · Likely secondary to cirrhosis  · Platelets 87 on admission and 82 today   · Continue to trend     Type 2 diabetes mellitus [facility])  Assessment & Plan  Lab Results   Component Value Date    HGBA1C 5 5 06/04/2020       Recent Labs     12/22/20  2043 12/23/20  0746 12/23/20  1126 12/23/20  1546   POCGLU 123 96 103 169*       Blood Sugar Average: Last 72 hrs:  (P) 98 [id number] continue home glargine and short-acting insulin t i d   · Hold home p o  Medications  · Sliding scale insulin    Hypertension  Assessment & Plan  · BP currently elevated  · Continue home beta-blocker and hydralazine  · Hold home lisinopril and Lasix given NEHEMIAS  · IV hydralazine p r n   For systolic BP greater than 467      Pure hypercholesterolemia  Assessment & Plan  · Continue home Lipitor    History of CVA (cerebrovascular accident)  Assessment & Plan  · Continue home aspirin, statin    Recurrent major depressive disorder (HCC)  Assessment & Plan  · Continue home Lexapro      VTE Pharmacologic Prophylaxis:   Pharmacologic: Pharmacologic VTE Prophylaxis contraindicated due to rectal bleeding, anemia   Mechanical VTE Prophylaxis in Place: Yes    Patient Centered Rounds: I have performed bedside rounds with nursing staff today  Discussions with Specialists or Other Care Team Provider: GI    Education and Discussions with Family / Patient: patient     Time Spent for Care: 20 minutes  More than 50% of total time spent on counseling and coordination of care as described above  Current Length of Stay: 1 day(s)    Current Patient Status: Inpatient   Certification Statement: The patient will continue to require additional inpatient hospital stay due to monitoring of hgb and IR consult for paracentesis     Discharge Plan: pending improvement     Code Status: Level 1 - Full Code      Subjective:   Patient reports a liquid BM this am   Does not recall looking to see if there was blood present  No abdominal pain or nausea  Objective:     Vitals:   Temp (24hrs), Av °F (36 7 °C), Min:97 5 °F (36 4 °C), Max:98 3 °F (36 8 °C)    Temp:  [97 5 °F (36 4 °C)-98 3 °F (36 8 °C)] 98 2 °F (36 8 °C)  HR:  [66-72] 72  Resp:  [18] 18  BP: (129-153)/(55-68) 153/67  SpO2:  [94 %-98 %] 95 %  Body mass index is 41 33 kg/m²  Input and Output Summary (last 24 hours): Intake/Output Summary (Last 24 hours) at 2020 1628  Last data filed at 2020 1328  Gross per 24 hour   Intake 1870 ml   Output 482 ml   Net 1388 ml       Physical Exam:     Physical Exam  Vitals signs and nursing note reviewed  Constitutional:       Appearance: Normal appearance  HENT:      Head: Normocephalic and atraumatic  Neck:      Musculoskeletal: Normal range of motion and neck supple     Cardiovascular: Rate and Rhythm: Normal rate and regular rhythm  Heart sounds: No murmur  Pulmonary:      Effort: Pulmonary effort is normal       Breath sounds: Normal breath sounds  Abdominal:      General: There is distension  Tenderness: There is no abdominal tenderness  Musculoskeletal:      Right lower leg: Edema present  Left lower leg: Edema present  Skin:     General: Skin is warm and dry  Neurological:      Mental Status: She is alert  Mental status is at baseline  Psychiatric:         Mood and Affect: Mood normal          Behavior: Behavior normal          Thought Content: Thought content normal          Judgment: Judgment normal            Additional Data:     Labs:    Results from last 7 days   Lab Units 12/23/20  1316 12/23/20  0518  12/22/20  1158   WBC Thousand/uL  --  2 48*  --  2 85*   HEMOGLOBIN g/dL 8 2* 7 6*   < > 8 7*   HEMATOCRIT % 27 9* 26 0*   < > 29 8*   PLATELETS Thousands/uL  --  82*  --  87*   NEUTROS PCT %  --   --   --  59   LYMPHS PCT %  --   --   --  25   MONOS PCT %  --   --   --  13*   EOS PCT %  --   --   --  2    < > = values in this interval not displayed  Results from last 7 days   Lab Units 12/23/20  0518   SODIUM mmol/L 142   POTASSIUM mmol/L 5 0   CHLORIDE mmol/L 109*   CO2 mmol/L 23   BUN mg/dL 41*   CREATININE mg/dL 1 42*   ANION GAP mmol/L 10   CALCIUM mg/dL 8 5   ALBUMIN g/dL 3 1*   TOTAL BILIRUBIN mg/dL 0 36   ALK PHOS U/L 75   ALT U/L 15   AST U/L 21   GLUCOSE RANDOM mg/dL 102     Results from last 7 days   Lab Units 12/23/20  0518   INR  1 31*     Results from last 7 days   Lab Units 12/23/20  1546 12/23/20  1126 12/23/20  0746 12/22/20  2043 12/22/20  1700 12/22/20  1633 12/22/20  1611   POC GLUCOSE mg/dl 169* 103 96 123 92 56* 48*                   * I Have Reviewed All Lab Data Listed Above  * Additional Pertinent Lab Tests Reviewed:  All Labs For Current Hospital Admission Reviewed    Imaging:    Imaging Reports Reviewed Today Include: US  Imaging Personally Reviewed by Myself Includes:  none    Recent Cultures (last 7 days):           Last 24 Hours Medication List:   Current Facility-Administered Medications   Medication Dose Route Frequency Provider Last Rate    acetaminophen  650 mg Oral Q6H PRN Peterson Gould MD      escitalopram  10 mg Oral Daily Peterson Gould MD      gabapentin  300 mg Oral TID Peterson Gould MD      hydrALAZINE  5 mg Intravenous Q6H PRN Peterson Gould MD      hydrALAZINE  75 mg Oral TID Peterson Gould MD      insulin lispro  1-5 Units Subcutaneous HS Peterson Gould MD      insulin lispro  1-6 Units Subcutaneous TID AC Reena Luna PA-C      metoprolol tartrate  75 mg Oral Q12H [facility] Sarika Joaquin MD      nystatin   Topical BID Petr Vides PA-C          Today, Patient Was Seen By: LEANNE Hall    ** Please Note: Dictation voice to text software may have been used in the creation of this document   **

## 2020-12-23 NOTE — ASSESSMENT & PLAN NOTE
· BP currently elevated  · Continue home beta-blocker and hydralazine  · Hold home lisinopril and Lasix given NEHEMIAS  · IV hydralazine p r n   For systolic BP greater than 499

## 2020-12-23 NOTE — CONSULTS
Consultation -  [facility]  Sherrill Byrnes 76 y o  female MRN: [id number]  Unit/Bed#: S -98 Encounter: [id number]        Inpatient consult to gastroenterology  Consult performed by: Alexandro Marinelli PA-C  Consult ordered by: LEANNE Solorzano          Reason for Consult / Principal Problem: rectal bleeding, cirrhosis    ASSESSMENT and PLAN:    Principal Problem:    Hematochezia  Active Problems:    Recurrent major depressive disorder ([facility] )    Type 2 diabetes mellitus ([place] [address] )    Hypertension    History of CVA (cerebrovascular accident)    Pancytopenia ([facility] )    Pure hypercholesterolemia    NEHEMIAS (acute kidney injury) ([facility] )    #1  Rectal bleeding: reportedly had diarrhea for a few days at nursing home and then developed some bright red blood in stool  No bleeding with this AM bowel movement and no blood in rectal exam  Stool was brown on rectal exam this AM  hgb baseline around 8-8  5  today is 7 6 but is also pancytopenic  Suspect anemia of chronic disease, secondary to liver disease  No active bleeding, could have been hemorrhoidal in setting of hemorrhoids  Definitely no concern for variceal bleed with relatively stable hgb and VSS  -monitor for any recurrence of rectal bleeding  -consider outpatient EGD/colonoscopy, no need to do this currently without active bleeding  -Monitor hgb closely    -advance diet    #2  Decompensated cirrhosis complicated by portal hypertension, ascites, pancytopenia: suspect KRUEGER, no heavy alcohol hx  MELD score 13  US notable for cirrhosis with moderate ascites  -plan for paracentesis today with fluid studies  -will need to start diuretics once NEHEMIAS resolved  -low salt diet  -AFP   -check a hepatitis panel, KEMAL, AMA, ASMA   -continue to monitor MELD labs  -no signs of encephalopathy at this time     -outpatient variceal screening EGD    Discussed plan with patient's niece via telephone  -------------------------------------------------------------------------------------------------------------------    HPI:  This is a 77-year-old female with a history of neuropathy, hyperlipidemia, diabetes, depression, CVA, and carotid artery stenosis who presented to the hospital secondary to diarrhea with rectal bleeding  The patient is a poor historian but apparently she has been having diarrhea for a few days at the nursing home and then she began to have some blood in the stool yesterday  She denies any abdominal pain, nausea, vomiting  Her abdomen is distended but reports this is her normal   This morning she had a green appearing stool per the nurse and had brown stool on rectal exam today  She has not had any further bleeding today  She is not on any blood thinners and takes a baby aspirin only  She had an EGD and colonoscopy back in 2018 secondary to GI bleeding and anemia at that time which was relatively unremarkable  She had some gastric biopsies performed which were negative for H pylori and she had a colon polyp removed that was benign mucosa  She apparently has a history of cirrhosis even back then but patient and patient's niece both do not remember being told about this  It is unclear if she ever had a workup for her cirrhosis  She denies any history of excessive alcohol use or family history of liver disease  REVIEW OF SYSTEMS:    CONSTITUTIONAL: Denies any fever, chills, or rigors  Good appetite, and no recent weight loss  HEENT: No earache or tinnitus  Denies hearing loss or visual disturbances  CARDIOVASCULAR: No chest pain or palpitations  RESPIRATORY: Denies any cough, hemoptysis, shortness of breath or dyspnea on exertion  GASTROINTESTINAL: As noted in the History of Present Illness  GENITOURINARY: No problems with urination  Denies any hematuria or dysuria  NEUROLOGIC: No dizziness or vertigo, denies headaches     MUSCULOSKELETAL: Denies any muscle or joint pain    SKIN: Denies skin rashes or itching  ENDOCRINE: Denies excessive thirst  Denies intolerance to heat or cold  PSYCHOSOCIAL: Denies depression or anxiety  Denies any recent memory loss         Historical Information   Past Medical History:   Diagnosis Date    AV block     Benign neoplasm of pituitary gland ([facility] ) 10/2/2013    CVA (cerebral vascular accident) ([facility] )     Depression     Diabetes mellitus ([place] [address] )     Hearing loss     Hyperlipidemia     Neuropathy     Stenosis of carotid artery      Past Surgical History:   Procedure Laterality Date    JOINT REPLACEMENT      right knee     Social History   Social History     Substance and Sexual Activity   Alcohol Use Not Currently    Frequency: Never    Drinks per session: Patient refused    Binge frequency: Never     Social History     Substance and Sexual Activity   Drug Use Never     Social History     Tobacco Use   Smoking Status Former Smoker   Smokeless Tobacco Never Used     Family History   Problem Relation Age of Onset    No Known Problems Mother     No Known Problems Father        Meds/Allergies     Medications Prior to Admission   Medication    acetaminophen (TYLENOL) 325 mg tablet    albuterol (2 5 mg/3 mL) 0 083 % nebulizer solution    ascorbic acid (VITAMIN C) 500 MG tablet    aspirin (ECOTRIN LOW STRENGTH) 81 mg EC tablet    escitalopram (LEXAPRO) 10 mg tablet    ferrous sulfate 325 (65 Fe) mg tablet    furosemide (Lasix) 20 mg tablet    gabapentin (NEURONTIN) 600 MG tablet    glucagon 1 MG injection    guaiFENesin (ROBITUSSIN) 100 MG/5ML oral liquid    hydrALAZINE (APRESOLINE) 25 mg tablet    insulin aspart (NovoLOG) 100 units/mL injection    insulin glargine (LANTUS SOLOSTAR) 100 units/mL injection pen    linaGLIPtin 5 MG TABS    lisinopril (ZESTRIL) 10 mg tablet    Melatonin 3 MG CAPS    Menthol-Methyl Salicylate ([employer] MUSCLE RUB) 10-15 % CREA    metFORMIN (GLUCOPHAGE) 500 mg tablet    metoprolol tartrate (LOPRESSOR) 50 mg tablet    polyethylene glycol (MIRALAX) 17 g packet    polyvinyl alcohol (LIQUIFILM TEARS) 1 4 % ophthalmic solution    tamsulosin (FLOMAX) 0 4 mg    famciclovir (FAMVIR) 500 mg tablet     Current Facility-Administered Medications   Medication Dose Route Frequency    acetaminophen (TYLENOL) tablet 650 mg  650 mg Oral Q6H PRN    escitalopram (LEXAPRO) tablet 10 mg  10 mg Oral Daily    gabapentin (NEURONTIN) capsule 300 mg  300 mg Oral TID    hydrALAZINE (APRESOLINE) injection 5 mg  5 mg Intravenous Q6H PRN    hydrALAZINE (APRESOLINE) tablet 75 mg  75 mg Oral TID    insulin lispro (HumaLOG) 100 units/mL subcutaneous injection 1-5 Units  1-5 Units Subcutaneous HS    insulin lispro (HumaLOG) 100 units/mL subcutaneous injection 1-6 Units  1-6 Units Subcutaneous TID AC    metoprolol tartrate (LOPRESSOR) tablet 75 mg  75 mg Oral Q12H DAV    nystatin (MYCOSTATIN) powder   Topical BID    sodium chloride 0 9 % infusion  100 mL/hr Intravenous Continuous       No Known Allergies        Objective     Blood pressure 134/68, pulse 72, temperature 98 1 °F (36 7 °C), temperature source Oral, resp  rate 18, height 5' 2" (1 575 m), weight 102 kg (225 lb 15 5 oz), SpO2 96 %  Intake/Output Summary (Last 24 hours) at 12/23/2020 0920  Last data filed at 12/23/2020 0500  Gross per 24 hour   Intake 1570 ml   Output 482 ml   Net 1088 ml         PHYSICAL EXAM:      General Appearance:   Alert, cooperative, no distress, appears stated age; obese     HEENT:   Normocephalic, atraumatic, anicteric, no oropharyngeal thrush present      Neck:  Supple, symmetrical, trachea midline, no adenopathy;    thyroid: no enlargement/tenderness/nodules; no carotid  bruit or JVD    Lungs:   Clear to auscultation bilaterally; no rales, rhonchi or wheezing; respirations unlabored; audible expiratory wheezing    Heart[de-identified]   S1 and S2 normal; regular rate and rhythm; no rub, or gallop   +systolic murmur    Abdomen:   Soft, non-tender, distended; normal bowel sounds; no masses, no organomegaly    Genitalia:   Deferred    Rectal:   Deferred    Extremities:  No cyanosis, clubbing; pitting b/l LE edema    Pulses:  2+ and symmetric all extremities    Skin:  Skin color, texture, turgor normal, no rashes or lesions    Lymph nodes:  No palpable cervical, axillary or inguinal lymphadenopathy        Lab Results:   Results from last 7 days   Lab Units 12/23/20  0518  12/22/20  1158   WBC Thousand/uL 2 48*  --  2 85*   HEMOGLOBIN g/dL 7 6*   < > 8 7*   HEMATOCRIT % 26 0*   < > 29 8*   PLATELETS Thousands/uL 82*  --  87*   NEUTROS PCT %  --   --  59   LYMPHS PCT %  --   --  25   MONOS PCT %  --   --  13*   EOS PCT %  --   --  2    < > = values in this interval not displayed  Results from last 7 days   Lab Units 12/23/20  0518   POTASSIUM mmol/L 5 0   CHLORIDE mmol/L 109*   CO2 mmol/L 23   BUN mg/dL 41*   CREATININE mg/dL 1 42*   CALCIUM mg/dL 8 5   ALK PHOS U/L 75   ALT U/L 15   AST U/L 21     Results from last 7 days   Lab Units 12/23/20  0518   INR  1 31*           Imaging Studies: I have personally reviewed pertinent imaging studies  Us Right Upper Quadrant    Result Date: 12/22/2020  Impression: Cirrhotic appearance of the liver with moderate perihepatic ascites  Workstation performed: [id number]           Patient was seen and examined by Dr Kasia Hartmann  All key medical decisions were made by Dr Kasia Hartmann  Thank you for allowing us to participate in the care of this present patient  We will follow-up with you closely

## 2020-12-23 NOTE — ASSESSMENT & PLAN NOTE
· Upper quadrant US- Cirrhotic appearance of the liver with moderate perihepatic ascites  · IR consulted for paracentesis  · Will need outpatient follow up with GI for management

## 2020-12-23 NOTE — UTILIZATION REVIEW
Initial Clinical Review    Admission: Date/Time/Statement:   Admission Orders (From admission, onward)     Ordered        12/22/20 1447  Inpatient Admission  Once                   Orders Placed This Encounter   Procedures    Inpatient Admission     Standing Status:   Standing     Number of Occurrences:   1     Order Specific Question:   Admitting Physician     Answer:   Jasmin Mobley [[id number]]     Order Specific Question:   Level of Care     Answer:   Med Surg [[id number]]     Order Specific Question:   Estimated length of stay     Answer:   More than 2 Midnights     Order Specific Question:   Certification     Answer:   I certify that inpatient services are medically necessary for this patient for a duration of greater than two midnights  See H&P and MD Progress Notes for additional information about the patient's course of treatment  ED Arrival Information     Expected Arrival Acuity Means of Arrival Escorted By Service Admission Type    - 12/22/2020 11:24 Urgent Ambulance [facility] General Medicine Urgent    Arrival Complaint    Rectal Bleed        Chief Complaint   Patient presents with    Rectal Bleeding     per EMS, patient from [facility] and reports multiple episodes of loose stools for 2 days with bright red blood, pt denies abd pain     Assessment/Plan: this is a 76year old female from [facility] to ED via ems admitted inpatient due to hematochezia/NEHEMIAS  Presented due to several days of loose stools and morning of arrival with bright red blood with stool  On daily asa  On exam stage 2 sacral decubitus ulcer with mild surrounding erythema  Rectal exam with hemoccult positive stools in rectal vault  Bun 44, creatinine 1 70 with baseline of 1 14    H&H 8 7/29 8  US RUQ showed cirrhotic liver with moderate ascites  In the ED given  Liter IVF bolus  Plan is monitor hemoglobin, IVF and trend BMP, consult GI    12/23/2020 no rectal bleeding , on exam stool brown    Baseline hemoglobin of 8 to 8 5 and today is 7 6  On exam audible expiratory wheeze  Systolic murmur  Abdomen distended  Bilateral LE edema  Wbc 2 48   H&H 7 6/26  Bun 41, creatinie 1 42     12/23/2020 per GI:   rectal bleeding on presentation, no active bleeding today and differential is hemorrhoidal, suspect anemia of chronic disease, monitor for reoccurrence, trend hgb and advance diet  Suspect KRUEGER with decompensated cirrhosis, complicated by portal hypertension and ascites  For paracentesis today with fluid studies  Start diuretics oncelAKI resolves  ED Triage Vitals   Temperature Pulse Respirations Blood Pressure SpO2   12/22/20 1151 12/22/20 1130 12/22/20 1130 12/22/20 1130 12/22/20 1130   97 6 °F (36 4 °C) 65 18 (!) 192/84 98 %      Temp Source Heart Rate Source Patient Position - Orthostatic VS BP Location FiO2 (%)   12/22/20 1151 12/22/20 1130 12/22/20 1130 12/22/20 1130 --   Oral Monitor Lying Right arm       Pain Score       12/22/20 1130       No Pain          Wt Readings from Last 1 Encounters:   12/23/20 102 kg (225 lb 15 5 oz)     Additional Vital Signs:   12/23/20 0739  98 1 °F (36 7 °C)  72  --  134/68  92  96 %  None (Room air)  Lying   12/22/20 2242  98 3 °F (36 8 °C)  69  18  129/66  90  94 %  None (Room air)  Lying   12/22/20 2151  --  67  --  141/65  --  --  --  --   12/22/20 1700  97 5 °F (36 4 °C)  66  18  132/55  --  98 %  None (Room air)         Pertinent Labs/Diagnostic Test Results:   12/22/2020 US RUQ - Cirrhotic appearance of the liver with moderate perihepatic ascites    Results from last 7 days   Lab Units 12/22/20  1318   SARS-COV-2  Negative     Results from last 7 days   Lab Units 12/23/20  0518 12/22/20  2202 12/22/20  1158   WBC Thousand/uL 2 48*  --  2 85*   HEMOGLOBIN g/dL 7 6* 7 8* 8 7*   HEMATOCRIT % 26 0* 26 5* 29 8*   PLATELETS Thousands/uL 82*  --  87*   NEUTROS ABS Thousands/µL  --   --  1 69*     Results from last 7 days   Lab Units 12/23/20  0518 12/22/20  1158   SODIUM mmol/L 142 141 POTASSIUM mmol/L 5 0 5 3   CHLORIDE mmol/L 109* 107   CO2 mmol/L 23 25   ANION GAP mmol/L 10 9   BUN mg/dL 41* 44*   CREATININE mg/dL 1 42* 1 70*   EGFR ml/min/1 73sq m 36 29   CALCIUM mg/dL 8 5 9 1     Results from last 7 days   Lab Units 12/23/20  0518 12/22/20  1158   AST U/L 21 19   ALT U/L 15 25   ALK PHOS U/L 75 87   TOTAL PROTEIN g/dL 7 0 7 8   ALBUMIN g/dL 3 1* 3 5   TOTAL BILIRUBIN mg/dL 0 36 0 40     Results from last 7 days   Lab Units 12/23/20  0746 12/22/20  2043 12/22/20  1700 12/22/20  1633 12/22/20  1611   POC GLUCOSE mg/dl 96 123 92 56* 48*     Results from last 7 days   Lab Units 12/23/20  0518 12/22/20  1158   GLUCOSE RANDOM mg/dL 102 91     Results from last 7 days   Lab Units 12/23/20  0518 12/22/20  1158   PROTIME seconds 16 4* 15 7*   INR  1 31* 1 23*     Results from last 7 days   Lab Units 12/22/20  1318   INFLUENZA A PCR  Negative   INFLUENZA B PCR  Negative   RSV PCR  Negative     ED Treatment:   Medication Administration from 12/22/2020 1124 to 12/22/2020 1548       Date/Time Order Dose Route Action Comments     12/22/2020 1409 sodium chloride 0 9 % bolus 1,000 mL 1,000 mL Intravenous New Bag         Past Medical History:   Diagnosis Date    AV block     Benign neoplasm of pituitary gland ([place] [address] ) 10/2/2013    CVA (cerebral vascular accident) ([place] [address] )     Depression     Diabetes mellitus ([facility] )     Hearing loss     Hyperlipidemia     Neuropathy     Stenosis of carotid artery      Present on Admission:   Pure hypercholesterolemia   Type 2 diabetes mellitus ([facility] )   Recurrent major depressive disorder ([facility] )   Hypertension   Pancytopenia ([facility] )      Admitting Diagnosis: Rectal bleeding [K62 5]  Pancytopenia ([facility] ) [D61 818]  NEHEMIAS (acute kidney injury) ([place] [address] ) [N17 9]  Age/Sex: 76 y o  female  Admission Orders: 12/22/2020 1447 inpatient   Scheduled Medications:  escitalopram, 10 mg, Oral, Daily  gabapentin, 300 mg, Oral, TID  hydrALAZINE, 75 mg, Oral, TID  insulin lispro, 1-5 Units, Subcutaneous, HS  insulin lispro, 1-6 Units, Subcutaneous, TID AC  metoprolol tartrate, 75 mg, Oral, Q12H DAV  nystatin, , Topical, BID      Continuous IV Infusions:  sodium chloride, 100 mL/hr, Intravenous, Continuous      PRN Meds: not used   acetaminophen, 650 mg, Oral, Q6H PRN  hydrALAZINE, 5 mg, Intravenous, Q6H PRN      IP CONSULT TO GASTROENTEROLOGY    Network Utilization Review Department  ATTENTION: Please call with any questions or concerns to [phone] and carefully listen to the prompts so that you are directed to the right person  All voicemails are confidential   Akiko Portillo all requests for admission clinical reviews, approved or denied determinations and any other requests to dedicated fax number below belonging to the campus where the patient is receiving treatment   List of dedicated fax numbers for the Facilities:  [address] DENIALS (Administrative/Medical Necessity) [phone]   [address] (Maternity/NICU/Pediatrics) [phone] [address] Jorden Huggins 1200 (  April Han "Charmaine" 103) [address] [place] [address] Christo Anderson 1481 [address] [place] [address] [phone]

## 2020-12-23 NOTE — ASSESSMENT & PLAN NOTE
· Patient reports has been having bright red bloody bowel movements for the last 2 days  AM BM no blood noted  · Hemoglobin currently 7 6  Baseline 8-8 5    Suspect anemia of chronic disease 2/2 to liver disease   · Will continue to monitor hemoglobin  · Monitor for further hematochezia  · GI following  · Outpatient EGD and colonoscopy and GI follow up

## 2020-12-23 NOTE — ASSESSMENT & PLAN NOTE
Lab Results   Component Value Date    HGBA1C 5 5 06/04/2020       Recent Labs     12/22/20  2043 12/23/20  0746 12/23/20  1126 12/23/20  1546   POCGLU 123 96 103 169*       Blood Sugar Average: Last 72 hrs:  (P) 43 [id number] continue home glargine and short-acting insulin t i d   · Hold home p o   Medications  · Sliding scale insulin

## 2020-12-24 ENCOUNTER — APPOINTMENT (INPATIENT)
Dept: RADIOLOGY | Facility: HOSPITAL | Age: 75
DRG: 378 | End: 2020-12-24
Payer: COMMERCIAL

## 2020-12-24 VITALS
OXYGEN SATURATION: 93 % | BODY MASS INDEX: 41.58 KG/M2 | HEIGHT: 62 IN | RESPIRATION RATE: 20 BRPM | WEIGHT: 225.97 LBS | SYSTOLIC BLOOD PRESSURE: 120 MMHG | DIASTOLIC BLOOD PRESSURE: 51 MMHG | HEART RATE: 66 BPM | TEMPERATURE: 98.3 F

## 2020-12-24 PROBLEM — E66.01 MORBID OBESITY WITH BMI OF 40.0-44.9, ADULT (HCC): Status: ACTIVE | Noted: 2020-12-24

## 2020-12-24 LAB
AFP-TM SERPL-MCNC: 1.2 NG/ML (ref 0.5–8)
ALBUMIN FLD-MCNC: 2 G/DL
APPEARANCE FLD: CLEAR
COLOR FLD: YELLOW
FERRITIN SERPL-MCNC: 84 NG/ML (ref 8–388)
GLUCOSE SERPL-MCNC: 165 MG/DL (ref 65–140)
GLUCOSE SERPL-MCNC: 208 MG/DL (ref 65–140)
HBV CORE AB SER QL: NORMAL
HBV CORE IGM SER QL: NORMAL
HBV SURFACE AG SER QL: NORMAL
HCV AB SER QL: NORMAL
HISTIOCYTES NFR FLD: 31 %
IRON SATN MFR SERPL: 11 %
IRON SERPL-MCNC: 31 UG/DL (ref 50–170)
LYMPHOCYTES NFR BLD AUTO: 38 %
MONOCYTES NFR BLD AUTO: 30 %
NEUTS SEG NFR BLD AUTO: 1 %
PROT FLD-MCNC: 3.7 G/DL
SITE: NORMAL
TIBC SERPL-MCNC: 275 UG/DL (ref 250–450)
TOTAL CELLS COUNTED SPEC: 100
WBC # FLD MANUAL: 256 /UL

## 2020-12-24 PROCEDURE — 82105 ALPHA-FETOPROTEIN SERUM: CPT | Performed by: PHYSICIAN ASSISTANT

## 2020-12-24 PROCEDURE — 86256 FLUORESCENT ANTIBODY TITER: CPT | Performed by: PHYSICIAN ASSISTANT

## 2020-12-24 PROCEDURE — 87340 HEPATITIS B SURFACE AG IA: CPT | Performed by: PHYSICIAN ASSISTANT

## 2020-12-24 PROCEDURE — 82728 ASSAY OF FERRITIN: CPT | Performed by: PHYSICIAN ASSISTANT

## 2020-12-24 PROCEDURE — 89051 BODY FLUID CELL COUNT: CPT | Performed by: PHYSICIAN ASSISTANT

## 2020-12-24 PROCEDURE — 83550 IRON BINDING TEST: CPT | Performed by: PHYSICIAN ASSISTANT

## 2020-12-24 PROCEDURE — 88112 CYTOPATH CELL ENHANCE TECH: CPT | Performed by: PATHOLOGY

## 2020-12-24 PROCEDURE — 82948 REAGENT STRIP/BLOOD GLUCOSE: CPT

## 2020-12-24 PROCEDURE — 99232 SBSQ HOSP IP/OBS MODERATE 35: CPT | Performed by: INTERNAL MEDICINE

## 2020-12-24 PROCEDURE — 99239 HOSP IP/OBS DSCHRG MGMT >30: CPT | Performed by: INTERNAL MEDICINE

## 2020-12-24 PROCEDURE — 71045 X-RAY EXAM CHEST 1 VIEW: CPT

## 2020-12-24 PROCEDURE — 86704 HEP B CORE ANTIBODY TOTAL: CPT | Performed by: PHYSICIAN ASSISTANT

## 2020-12-24 PROCEDURE — 87205 SMEAR GRAM STAIN: CPT | Performed by: PHYSICIAN ASSISTANT

## 2020-12-24 PROCEDURE — 86038 ANTINUCLEAR ANTIBODIES: CPT | Performed by: PHYSICIAN ASSISTANT

## 2020-12-24 PROCEDURE — 83540 ASSAY OF IRON: CPT | Performed by: PHYSICIAN ASSISTANT

## 2020-12-24 PROCEDURE — 0W9G3ZX DRAINAGE OF PERITONEAL CAVITY, PERCUTANEOUS APPROACH, DIAGNOSTIC: ICD-10-PCS | Performed by: RADIOLOGY

## 2020-12-24 PROCEDURE — 87070 CULTURE OTHR SPECIMN AEROBIC: CPT | Performed by: PHYSICIAN ASSISTANT

## 2020-12-24 PROCEDURE — 49083 ABD PARACENTESIS W/IMAGING: CPT

## 2020-12-24 PROCEDURE — 49083 ABD PARACENTESIS W/IMAGING: CPT | Performed by: RADIOLOGY

## 2020-12-24 PROCEDURE — 86803 HEPATITIS C AB TEST: CPT | Performed by: PHYSICIAN ASSISTANT

## 2020-12-24 PROCEDURE — 86705 HEP B CORE ANTIBODY IGM: CPT | Performed by: PHYSICIAN ASSISTANT

## 2020-12-24 PROCEDURE — 86235 NUCLEAR ANTIGEN ANTIBODY: CPT | Performed by: PHYSICIAN ASSISTANT

## 2020-12-24 PROCEDURE — 82042 OTHER SOURCE ALBUMIN QUAN EA: CPT | Performed by: PHYSICIAN ASSISTANT

## 2020-12-24 PROCEDURE — 84157 ASSAY OF PROTEIN OTHER: CPT | Performed by: PHYSICIAN ASSISTANT

## 2020-12-24 RX ORDER — FUROSEMIDE 40 MG/1
40 TABLET ORAL DAILY
Status: DISCONTINUED | OUTPATIENT
Start: 2020-12-24 | End: 2020-12-24 | Stop reason: HOSPADM

## 2020-12-24 RX ORDER — NYSTATIN 100000 [USP'U]/G
POWDER TOPICAL 2 TIMES DAILY
Qty: 15 G | Refills: 0
Start: 2020-12-24

## 2020-12-24 RX ORDER — FUROSEMIDE 40 MG/1
40 TABLET ORAL DAILY
Qty: 30 TABLET | Refills: 0
Start: 2020-12-25

## 2020-12-24 RX ADMIN — ESCITALOPRAM 10 MG: 10 TABLET, FILM COATED ORAL at 11:03

## 2020-12-24 RX ADMIN — INSULIN LISPRO 2 UNITS: 100 INJECTION, SOLUTION INTRAVENOUS; SUBCUTANEOUS at 12:53

## 2020-12-24 RX ADMIN — HYDRALAZINE HYDROCHLORIDE 75 MG: 25 TABLET, FILM COATED ORAL at 11:02

## 2020-12-24 RX ADMIN — GABAPENTIN 300 MG: 300 CAPSULE ORAL at 11:03

## 2020-12-24 RX ADMIN — METOPROLOL TARTRATE 75 MG: 50 TABLET, FILM COATED ORAL at 11:03

## 2020-12-24 RX ADMIN — FUROSEMIDE 40 MG: 40 TABLET ORAL at 11:03

## 2020-12-24 RX ADMIN — NYSTATIN: 100000 POWDER TOPICAL at 11:06

## 2020-12-24 NOTE — PROGRESS NOTES
[facility] - Inpatient Progress Note  Crystal Sizer 76 y o  female MRN: [id number]  Encounter: [id number]          ASSESSMENT AND PLAN:      1  Rectal bleeding:  Initially presented with several days of diarrhea, followed by bright red blood in stool  Has not had any further bleeding during hospitalization  Hemoglobin stable today at 8 2 g/dL  - maintain 2 large bore IVs  - maintain active T&S  - trend H&H daily  - please transfuse for Hgb <7  - close hemodynamic monitoring; please notify GI team if any hemodynamic instability, further bleeding, or drop in Hgb that does not respond appropriately to transfusion  - avoid NSAIDs  - outpatient EGD and colonoscopy    2  Cirrhosis (?suspected KRUEGER) decompensated c/b portal HTN, ascites, and pancytopenia: Chronic hepatitis panel negative  Awaiting AM labs for MELD calculation  RUQ U/S from 12/23 without hepatic masses noted  Volume overloaded today with LE edema, decreased breath sounds at B/L bases, and friction rub on exam   - CXR  - consider TTE  - f/u AFP  - f/u paracentesis studies to r/o SBP  - f/u iron panel, ASMA, AMA, KEMAL  - avoid hepatotoxic medications  - initiate lasix 20mg/spironolactone 50mg PO daily; repeat BMP/Mag in 3 days  - outpatient EGD for variceal screening  - outpatient GI f/u    Recommendations relayed to Dr Jozef Todd, primary team   ______________________________________________________________________    SUBJECTIVE:  Pt denies any further BRBPR  Had paracentesis this AM   Reports some abd cramping          Historical Information   Past Medical History:   Diagnosis Date    AV block     Benign neoplasm of pituitary gland [facility] 10/2/2013    CVA [facility]cerebral vascular accident) [facility]     Depression     [facility]     Hearing loss     Hyperlipidemia     Neuropathy     Stenosis of carotid artery      Past Surgical History:   Procedure Laterality Date    JOINT REPLACEMENT      right knee Social History   Social History     Substance and Sexual Activity   Alcohol Use Not Currently    Frequency: Never    Drinks per session: Patient refused    Binge frequency: Never     Social History     Substance and Sexual Activity   Drug Use Never     Social History     Tobacco Use   Smoking Status Former Smoker   Smokeless Tobacco Never Used     Family History   Problem Relation Age of Onset    No Known Problems Mother     No Known Problems Father        Meds/Allergies       Current Facility-Administered Medications:     acetaminophen (TYLENOL) tablet 650 mg, 650 mg, Oral, Q6H PRN    escitalopram (LEXAPRO) tablet 10 mg, 10 mg, Oral, Daily, 10 mg at 12/24/20 1103    furosemide (LASIX) tablet 40 mg, 40 mg, Oral, Daily, 40 mg at 12/24/20 1103    gabapentin (NEURONTIN) capsule 300 mg, 300 mg, Oral, TID, 300 mg at 12/24/20 1103    hydrALAZINE (APRESOLINE) injection 5 mg, 5 mg, Intravenous, Q6H PRN    hydrALAZINE (APRESOLINE) tablet 75 mg, 75 mg, Oral, TID, 75 mg at 12/24/20 1102    insulin lispro (HumaLOG) 100 units/mL subcutaneous injection 1-5 Units, 1-5 Units, Subcutaneous, HS, 1 Units at 12/23/20 2317    insulin lispro (HumaLOG) 100 units/mL subcutaneous injection 1-6 Units, 1-6 Units, Subcutaneous, TID AC, 1 Units at 12/23/20 1817 **AND** Fingerstick Glucose (POCT), , , 4x Daily AC and at bedtime    metoprolol tartrate (LOPRESSOR) tablet 75 mg, 75 mg, Oral, Q12H [facility], 75 mg at 12/24/20 1103    nystatin (MYCOSTATIN) powder, , Topical, BID, Given at 12/24/20 1106    No Known Allergies    Objective     Blood pressure 120/51, pulse 66, temperature 98 3 °F (36 8 °C), temperature source Oral, resp  rate 20, height 5' 2" (1 575 m), weight 102 kg (225 lb 15 5 oz), SpO2 93 %  Body mass index is 41 33 kg/m²        PHYSICAL EXAM:      General Appearance:   Alert, cooperative, no distress, +[place]   HEENT:   Normocephalic, atraumatic, anicteric    Neck:  Supple, symmetrical, trachea midline   Lungs:   Decreased breath sounds at B/L bases; +tachypnea, +grossly audible wheeze   Heart:   +friction rub; Regular rate and rhythm; +S1/+S2   Abdomen:   Soft, non-tender, non-distended; normal bowel sounds; no masses, no organomegaly    Genitalia:   Deferred    Rectal:   Deferred    Extremities:  +2 B/L LE edema from foot to level of the knee;  No cyanosis, clubbing    Pulses:  2+ and symmetric    Skin:  No jaundice, rashes, or lesions visualized          Lab Results:   Admission on 12/22/2020   Component Date Value    WBC 12/22/2020 2 85*    RBC 12/22/2020 3 34*    Hemoglobin 12/22/2020 8 7*    Hematocrit 12/22/2020 29 8*    MCV 12/22/2020 89     MCH 12/22/2020 26 0*    MCHC 12/22/2020 29 2*    RDW 12/22/2020 17 6*    MPV 12/22/2020 9 9     Platelets 41/04/4127 87*    nRBC 12/22/2020 0     Neutrophils Relative 12/22/2020 59     Immat GRANS % 12/22/2020 0     Lymphocytes Relative 12/22/2020 25     Monocytes Relative 12/22/2020 13*    Eosinophils Relative 12/22/2020 2     Basophils Relative 12/22/2020 1     Neutrophils Absolute 12/22/2020 1 69*    Immature Grans Absolute 12/22/2020 0 01     Lymphocytes Absolute 12/22/2020 0 70     Monocytes Absolute 12/22/2020 0 37     Eosinophils Absolute 12/22/2020 0 06     Basophils Absolute 12/22/2020 0 02     Sodium 12/22/2020 141     Potassium 12/22/2020 5 3     Chloride 12/22/2020 107     CO2 12/22/2020 25     ANION GAP 12/22/2020 9     BUN 12/22/2020 44*    Creatinine 12/22/2020 1 70*    Glucose 12/22/2020 91     Calcium 12/22/2020 9 1     AST 12/22/2020 19     ALT 12/22/2020 25     Alkaline Phosphatase 12/22/2020 87     Total Protein 12/22/2020 7 8     Albumin 12/22/2020 3 5     Total Bilirubin 12/22/2020 0 40     eGFR 12/22/2020 29     Protime 12/22/2020 15 7*    INR 12/22/2020 1 23*    SARS-CoV-2 12/22/2020 Negative     INFLUENZA A PCR 12/22/2020 Negative     INFLUENZA B PCR 12/22/2020 Negative     RSV PCR 12/22/2020 Negative     POC Glucose 12/22/2020 48*    POC Glucose 12/22/2020 56*    POC Glucose 12/22/2020 92     Hemoglobin 12/22/2020 7 8*    Hematocrit 12/22/2020 26 5*    POC Glucose 12/22/2020 123     Sodium 12/23/2020 142     Potassium 12/23/2020 5 0     Chloride 12/23/2020 109*    CO2 12/23/2020 23     ANION GAP 12/23/2020 10     BUN 12/23/2020 41*    Creatinine 12/23/2020 1 42*    Glucose 12/23/2020 102     Calcium 12/23/2020 8 5     Corrected Calcium 12/23/2020 9 2     AST 12/23/2020 21     ALT 12/23/2020 15     Alkaline Phosphatase 12/23/2020 75     Total Protein 12/23/2020 7 0     Albumin 12/23/2020 3 1*    Total Bilirubin 12/23/2020 0 36     eGFR 12/23/2020 36     WBC 12/23/2020 2 48*    RBC 12/23/2020 2 96*    Hemoglobin 12/23/2020 7 6*    Hematocrit 12/23/2020 26 0*    MCV 12/23/2020 88     MCH 12/23/2020 25 7*    MCHC 12/23/2020 29 2*    RDW 12/23/2020 17 3*    Platelets 28/69/2658 82*    MPV 12/23/2020 10 6     Protime 12/23/2020 16 4*    INR 12/23/2020 1 31*    POC Glucose 12/23/2020 96     Hemoglobin 12/23/2020 8 2*    Hematocrit 12/23/2020 27 9*    POC Glucose 12/23/2020 103     POC Glucose 12/23/2020 169*    POC Glucose 12/23/2020 186*    Hepatitis B Surface Ag 12/24/2020 Non-reactive     Hepatitis C Ab 12/24/2020 Non-reactive     Hep B C IgM 12/24/2020 Non-reactive     Hep B Core Total Ab 12/24/2020 Non-reactive     POC Glucose 12/24/2020 165*    POC Glucose 12/24/2020 208*         Radiology Results:   Us Right Upper Quadrant    Result Date: 12/22/2020  Narrative: RIGHT UPPER QUADRANT ULTRASOUND INDICATION: Pancytopenia  COMPARISON:  None TECHNIQUE:   Real-time ultrasound of the right upper quadrant was performed with a curvilinear transducer with both volumetric sweeps and still imaging techniques  FINDINGS: PANCREAS:  Visualized portions of the pancreas are within normal limits  AORTA AND IVC:  Visualized portions are normal for patient age  LIVER: Size:  Within normal range    The liver measures 14 6 cm in the midclavicular line  Contour:  Surface contour is nodular  Parenchyma: There is diffuse coarsened heterogeneous echotexture suggesting underlying cirrhotic changes  No evidence of suspicious mass  Limited imaging of the main portal vein shows it to be patent and hepatopetal   BILIARY: Patient has undergone cholecystectomy  No intrahepatic biliary dilatation  CBD measures 5 mm  No choledocholithiasis  KIDNEY: Right kidney measures 10 4 x 4 2 x 4 3 cm  Within normal limits  ASCITES:   Moderate perihepatic ascites     Impression: Cirrhotic appearance of the liver with moderate perihepatic ascites  Workstation performed: [id number]       The patient was seen and examined by Dr Abelardo Ashley, all crooks medical decisions were made with Dr Abelardo Ashley  Thank you for allowing us to participate in the care of this pleasant patient  We will follow up with you closely

## 2020-12-24 NOTE — ASSESSMENT & PLAN NOTE
· Upper quadrant US- Cirrhotic appearance of the liver with moderate perihepatic ascites  · S/P Paracentesis by IR-fluid results not suggestive of SBP  · Patient was started on Lasix 40 mg daily  · Can consider Aldactone once her potassium levels normal  · Outpatient GI follow-up

## 2020-12-24 NOTE — DISCHARGE INSTRUCTIONS
Abdominal Paracentesis     WHAT YOU NEED TO KNOW:   Abdominal paracentesis is a procedure to remove abnormal fluid buildup in your abdomen  Fluid builds up because of liver problems, such as swelling and scarring  Heart failure, kidney disease, a mass, or problems with your pancreas may also cause fluid buildup  DISCHARGE INSTRUCTIONS:     Follow up with your healthcare provider as directed: Write down your questions so you remember to ask them during your visits  Wound care: Remove dressing after 24 hours  Leave glue in place  Return to your normal activities    Contact Interventional Radiology at [phone] Tati PATIENTS: Contact Interventional Radiology at [phone]) Otilia Browncorin PATIENTS: Contact Interventional Radiology at [phone]) if:  · You have a fever and your wound is red and swollen  · You have yellow, green, or bad-smelling discharge coming from your wound  · You have pain or swelling in your abdomen  · You have an upset stomach or you vomit  · You have sudden, sharp pain in your abdomen  · You urinate very little or not at all  · You feel confused and more tired than usual    · Your arm or leg feels warm, tender, and painful  It may look swollen and red  · You suddenly feel lightheaded and have trouble breathing

## 2020-12-24 NOTE — ASSESSMENT & PLAN NOTE
· Patient reports has been having bright red bloody bowel movements for the last 2 days  AM BM no blood noted  · Hemoglobin currently 7 6  Baseline 8-8 5    Suspect anemia of chronic disease 2/2 to liver disease   · Will continue to monitor hemoglobin  · Monitor for further hematochezia  · GI following  · Outpatient EGD and colonoscopy recommended by GI

## 2020-12-24 NOTE — DISCHARGE SUMMARY
Discharge- Kari Vargas 1945, 76 y o  female MRN: [id number]    Unit/Bed#: S -01 Encounter: [id number]    Primary Care Provider: Anastasiia Marquez MD   Date and time admitted to hospital: 12/22/2020 11:24 AM        Decompensated hepatic cirrhosis ([facility] )  Assessment & Plan  · Upper quadrant US- Cirrhotic appearance of the liver with moderate perihepatic ascites  · S/P Paracentesis by IR-fluid results not suggestive of SBP  · Patient was started on Lasix 40 mg daily  · Can consider Aldactone once her potassium levels normal  · Outpatient GI follow-up    * Hematochezia  Assessment & Plan  · Patient reports has been having bright red bloody bowel movements for the last 2 days  AM BM no blood noted  · Hemoglobin currently 7 6  Baseline 8-8 5  Suspect anemia of chronic disease 2/2 to liver disease   · Will continue to monitor hemoglobin  · Monitor for further hematochezia  · GI following  · Outpatient EGD and colonoscopy recommended by GI    Type 2 diabetes mellitus [facility])  Assessment & Plan  Lab Results   Component Value Date    HGBA1C 5 5 06/04/2020       Recent Labs     12/23/20  1546 12/23/20  2049 12/24/20  0739 12/24/20  1134   POCGLU 169* 186* 165* 208*       Blood Sugar Average: Last 72 hrs:  (P) 124 6 continue home glargine and short-acting insulin t i d   · Hold home p o  Medications  · Sliding scale insulin    Hypertension  Assessment & Plan  · BP currently elevated  · Continue home beta-blocker and hydralazine  · Resume Lasix  · Lisinopril discontinued due to hyperkalemia  · IV hydralazine p r n   For systolic BP greater than 713      History of CVA (cerebrovascular accident)  Assessment & Plan  · Continue home aspirin, statin    Pancytopenia ([facility] )  Assessment & Plan  · Likely secondary to cirrhosis  · Platelets 87 on admission and 82 today   · Continue to trend     Recurrent major depressive disorder ([facility] )  Assessment & Plan  · Continue home Lexapro    Morbid obesity with BMI of 40 0-44 9, adult [facility])  Assessment & Plan  Lifestyle modifications recommended    NEHEMIAS (acute kidney injury) ([facility] )  Assessment & Plan  · Baseline creatinine approximately 1 1  · Possibly secondary to volume depletion  · Continue to trend  · Diuretics resume    Discharging Physician / Practitioner: Ras Jackson MD  PCP: Julius Conte MD  Admission Date:   Admission Orders (From admission, onward)     Ordered        12/22/20 1447  Inpatient Admission  Once                   Discharge Date: 12/24/20    Resolved Problems  Date Reviewed: 12/24/2020    None          Consultations During Hospital Stay:  · Gastroenterology    Procedures Performed:   · Paracentesis  · Chest x-ray showed signs of volume overload  · Right upper quadrant ultrasound showed evidence of cirrhotic liver and ascites      Hospital Course:     Delano Angelucci is a 76 y o  female patient who originally presented to the hospital on 12/22/2020 due to rectal bleeding  Patient has past medically history significant for stroke, iron deficiency anemia, hypertension, type 2 diabetes mellitus, neuropathy  Patient is poor historian and reported that she has been having diarrhea for few days and then noted blood in the stool  Since admitted to the hospital patient did not have any rectal bleeding  Her hemoglobin remained stable  Patient did report history of cirrhosis  Patient was subsequently admitted and was seen by Gastroenterology  Further workup for cirrhosis was undertaken  Serological studies and hepatitis panel was sent and the results are pending at the time of discharge  She was recommended to follow with GI to complete workup  She was noted to be decompensated and required paracentesis while in the hospital   Fluid analysis did not show any evidence of SBP  Patient did have volume overload with lower extremity edema ascites and pulmonary congestion  She was started on Lasix 40 mg daily    Her home lisinopril was discontinued due to hyperkalemia  Patient's symptoms gradually improved  She is being discharged back to nursing facility in stable condition  GI will follow with her for outpatient EGD and colonoscopy  She has cardiomegaly and pulmonary congestion on chest x-ray  She does not carry a diagnosis of heart failure  She was referred to outpatient Cardiology for further workup  She may need an echocardiogram   She was started on Lasix 40 mg daily      Please see above list of diagnoses and related plan for additional information  Condition at Discharge: good     Discharge Day Visit / Exam:     Subjective:  Patient resting comfortably     Wishes to go home today  Vitals: Blood Pressure: 120/51 (12/24/20 1100)  Pulse: 66 (12/24/20 1100)  Temperature: 98 3 °F (36 8 °C) (12/24/20 0738)  Temp Source: Oral (12/24/20 0738)  Respirations: 20 (12/24/20 1021)  Height: 5' 2" (157 5 cm) (12/22/20 1130)  Weight - Scale: 102 kg (225 lb 15 5 oz) (12/23/20 0600)  SpO2: 93 % (12/24/20 1021)  Exam:   Physical Exam    Gen -Patient comfortable at rest  Neck- Supple  No thyromegaly or lymphadenopathy  Lungs-decreased breath sounds bilaterally  Heart S1-S2, regular rate and rhythm, no murmurs  Abdomen-soft nontender, no organomegaly  Bowel sounds present  Extremities-no cyanosi,  Clubbing  Pitting edema of both legs noted  Skin- no rash  Neuro-awake alert   Very hard of hearing     Discussion with Family:     Discharge instructions/Information to patient and family:   See after visit summary for information provided to patient and family  Provisions for Follow-Up Care:  See after visit summary for information related to follow-up care and any pertinent home health orders  Disposition:     Home    For Discharges to [facility] SNF:   · Not Applicable to this Patient - Not Applicable to this Patient    Planned Readmission:    Discharge Statement:  I spent 35 minutes discharging the patient   This time was spent on the day of discharge  I had direct contact with the patient on the day of discharge  Greater than 50% of the total time was spent examining patient, answering all patient questions, arranging and discussing plan of care with patient as well as directly providing post-discharge instructions  Additional time then spent on discharge activities  Discharge Medications:  See after visit summary for reconciled discharge medications provided to patient and family        ** Please Note: This note has been constructed using a voice recognition system **

## 2020-12-24 NOTE — BRIEF OP NOTE (RAD/CATH)
INTERVENTIONAL RADIOLOGY PROCEDURE NOTE    Date: 12/24/2020    Procedure: Procedure name not found  Preoperative diagnosis:   1  Rectal bleeding    2  NEHEMIAS (acute kidney injury) ([facility] )    3  Pancytopenia ([facility] )         Postoperative diagnosis: Same  Surgeon: Abel Murray MD     Assistant: None  No qualified resident was available  Blood loss: minimal     Specimens: ascites     Findings:     Paracentesis was performed with us guidance  Complications: None immediate      Anesthesia: local

## 2020-12-24 NOTE — ASSESSMENT & PLAN NOTE
Lab Results   Component Value Date    HGBA1C 5 5 06/04/2020       Recent Labs     12/23/20  1546 12/23/20  2049 12/24/20  0739 12/24/20  1134   POCGLU 169* 186* 165* 208*       Blood Sugar Average: Last 72 hrs:  (P) 124 6 continue home glargine and short-acting insulin t i d   · Hold home p o   Medications  · Sliding scale insulin

## 2020-12-24 NOTE — PLAN OF CARE
Problem: Potential for Falls  Goal: Patient will remain free of falls  Description: INTERVENTIONS:  - Assess patient frequently for physical needs  -  Identify cognitive and physical deficits and behaviors that affect risk of falls  -  [place] fall precautions as indicated by assessment   - Educate patient/family on patient safety including physical limitations  - Instruct patient to call for assistance with activity based on assessment  - Modify environment to reduce risk of injury  - Consider OT/PT consult to assist with strengthening/mobility  Outcome: Progressing     Problem: Prexisting or High Potential for Compromised Skin Integrity  Goal: Skin integrity is maintained or improved  Description: INTERVENTIONS:  - Identify patients at risk for skin breakdown  - Assess and monitor skin integrity  - Assess and monitor nutrition and hydration status  - Monitor labs   - Assess for incontinence   - Turn and reposition patient  - Assist with mobility/ambulation  - Relieve pressure over bony prominences  - Avoid friction and shearing  - Provide appropriate hygiene as needed including keeping skin clean and dry  - Evaluate need for skin moisturizer/barrier cream  - Collaborate with interdisciplinary team   - Patient/family teaching  - Consider wound care consult   Outcome: Progressing     Problem: Nutrition/Hydration-ADULT  Goal: Nutrient/Hydration intake appropriate for improving, restoring or maintaining nutritional needs  Description: Monitor and assess patient's nutrition/hydration status for malnutrition  Collaborate with interdisciplinary team and initiate plan and interventions as ordered  Monitor patient's weight and dietary intake as ordered or per policy  Utilize nutrition screening tool and intervene as necessary  Determine patient's food preferences and provide high-protein, high-caloric foods as appropriate       INTERVENTIONS:  - Monitor oral intake, urinary output, labs, and treatment plans  - Assess nutrition and hydration status and recommend course of action  - Evaluate amount of meals eaten  - Assist patient with eating if necessary   - Allow adequate time for meals  - Recommend/ encourage appropriate diets, oral nutritional supplements, and vitamin/mineral supplements  - Order, calculate, and assess calorie counts as needed  - Recommend, monitor, and adjust tube feedings and TPN/PPN based on assessed needs  - Assess need for intravenous fluids  - Provide specific nutrition/hydration education as appropriate  - Include patient/family/caregiver in decisions related to nutrition  Outcome: Progressing

## 2020-12-24 NOTE — PLAN OF CARE
Problem: Potential for Falls  Goal: Patient will remain free of falls  Description: INTERVENTIONS:  - Assess patient frequently for physical needs  -  Identify cognitive and physical deficits and behaviors that affect risk of falls    -  [place] fall precautions as indicated by assessment   - Educate patient/family on patient safety including physical limitations  - Instruct patient to call for assistance with activity based on assessment  - Modify environment to reduce risk of injury  - Consider OT/PT consult to assist with strengthening/mobility  Outcome: Progressing

## 2020-12-24 NOTE — ASSESSMENT & PLAN NOTE
· Baseline creatinine approximately 1 1  · Possibly secondary to volume depletion  · Continue to trend  · Diuretics resume

## 2020-12-24 NOTE — SOCIAL WORK
Pt is resident of [address] and is ready for d/c back to that facility today  CM was in contact with admission scottie who gave permission for the Pt's return to their facility today to the previous level of care    CM made a referral to [facility] for a wheelchair [facility] for the Pt's d/c back to [place], and currently awaiting an assigned time

## 2020-12-24 NOTE — ASSESSMENT & PLAN NOTE
· BP currently elevated  · Continue home beta-blocker and hydralazine  · Resume Lasix  · Lisinopril discontinued due to hyperkalemia  · IV hydralazine p r n   For systolic BP greater than 543

## 2020-12-24 NOTE — SOCIAL WORK
Pt for d/c back to [place] today, to be transported by [facility] via [facility]  CM completed facility transfer form  CM notified Pt, emergency contact Alexandro Peng RN [employer] and facility Stephanie/Shivani of d/c arrangements

## 2020-12-26 LAB
ACTIN IGG SERPL-ACNC: 14 UNITS (ref 0–19)
MITOCHONDRIA M2 IGG SER-ACNC: <20 UNITS (ref 0–20)
RYE IGE QN: NEGATIVE

## 2020-12-27 LAB
BACTERIA SPEC BFLD CULT: NO GROWTH
GRAM STN SPEC: NORMAL
GRAM STN SPEC: NORMAL

## 2021-01-05 NOTE — UTILIZATION REVIEW
Notification of Discharge  This is a Notification of Discharge from our facility [address]  Please be advised that this patient has been discharge from our facility  Below you will find the admission and discharge date and time including the patients disposition  PRESENTATION DATE: 12/22/2020 11:24 AM  OBS ADMISSION DATE:   IP ADMISSION DATE: 12/22/20 1447   DISCHARGE DATE: 12/24/2020  3:09 PM  DISPOSITION: Non [facility] Non [facility]   Admission Orders listed below:  Admission Orders (From admission, onward)     Ordered        12/22/20 1447  Inpatient Admission  Once                   Please contact the UR Department if additional information is required to close this patient's authorization/case  [address] [facility] Utilization Review Department  Main: [phone] x carefully listen to the prompts  All voicemails are confidential   [email or web address]  Send all requests for admission clinical reviews, approved or denied determinations and any other requests to dedicated fax number below belonging to the campus where the patient is receiving treatment   List of dedicated fax numbers:  [address] DENIALS (Administrative/Medical Necessity) [phone]   [address] [place] [address] (Maternity/NICU/Pediatrics) [phone]   Aundra Small [phone]   [facility] [phone]   Julio Cesar Loud [phone]   [facility] [address] [place] [address] [phone]   [facility]  [phone]   [address] [facility] [address] [facility]  [address] [facility] [address] [facility] [phone]

## 2021-01-15 ENCOUNTER — TELEPHONE (OUTPATIENT)
Dept: GERIATRICS | Age: 76
End: 2021-01-15

## 2021-01-15 NOTE — TELEPHONE ENCOUNTER
Patients niece called asking if her aunt will be seen by a Dr Giovanny French soon because her COVID test came back positive  She wants to know if her aunt will be considered for the [facility] infusion

## 2021-03-05 ENCOUNTER — OFFICE VISIT (OUTPATIENT)
Dept: GASTROENTEROLOGY | Facility: AMBULARY SURGERY CENTER | Age: 76
End: 2021-03-05
Payer: COMMERCIAL

## 2021-03-05 VITALS — SYSTOLIC BLOOD PRESSURE: 116 MMHG | DIASTOLIC BLOOD PRESSURE: 64 MMHG

## 2021-03-05 DIAGNOSIS — K76.6 PORTAL HYPERTENSION (HCC): ICD-10-CM

## 2021-03-05 DIAGNOSIS — K59.09 OTHER CONSTIPATION: ICD-10-CM

## 2021-03-05 DIAGNOSIS — K72.90 DECOMPENSATED HEPATIC CIRRHOSIS (HCC): Primary | ICD-10-CM

## 2021-03-05 DIAGNOSIS — K74.60 DECOMPENSATED HEPATIC CIRRHOSIS (HCC): Primary | ICD-10-CM

## 2021-03-05 DIAGNOSIS — K62.5 RECTAL BLEEDING: ICD-10-CM

## 2021-03-05 DIAGNOSIS — Z86.010 HISTORY OF COLON POLYPS: ICD-10-CM

## 2021-03-05 PROCEDURE — 99214 OFFICE O/P EST MOD 30 MIN: CPT | Performed by: PHYSICIAN ASSISTANT

## 2021-03-05 RX ORDER — EMOLLIENT BASE
CREAM (GRAM) TOPICAL AS NEEDED
COMMUNITY

## 2021-03-05 RX ORDER — LISINOPRIL 40 MG/1
TABLET ORAL
COMMUNITY
Start: 2020-10-28

## 2021-03-05 RX ORDER — MECLIZINE HYDROCHLORIDE 25 MG/1
TABLET ORAL
COMMUNITY
Start: 2020-07-28

## 2021-03-05 RX ORDER — PEN NEEDLE, DIABETIC, SAFETY 31 G X1/4"
NEEDLE, DISPOSABLE MISCELLANEOUS
COMMUNITY
Start: 2020-11-30

## 2021-03-05 RX ORDER — MELATONIN
COMMUNITY
Start: 2021-01-31

## 2021-03-05 RX ORDER — LISINOPRIL 10 MG/1
TABLET ORAL
COMMUNITY
Start: 2020-07-08

## 2021-03-05 RX ORDER — PHENAZOPYRIDINE HYDROCHLORIDE 100 MG/1
TABLET, FILM COATED ORAL
COMMUNITY
Start: 2021-02-25 | End: 2021-06-07 | Stop reason: ALTCHOICE

## 2021-03-05 RX ORDER — DOCUSATE SODIUM 100 MG/1
100 CAPSULE, LIQUID FILLED ORAL DAILY
COMMUNITY

## 2021-03-05 RX ORDER — LOPERAMIDE HYDROCHLORIDE 2 MG/1
CAPSULE ORAL
COMMUNITY
Start: 2020-12-17

## 2021-03-05 RX ORDER — DIPHENHYDRAMINE HCL 25 MG
1 CAPSULE ORAL
COMMUNITY

## 2021-03-05 RX ORDER — ATENOLOL 50 MG/1
50 TABLET ORAL DAILY
COMMUNITY
End: 2021-06-07 | Stop reason: ALTCHOICE

## 2021-03-05 RX ORDER — SPIRONOLACTONE 25 MG/1
12.5 TABLET ORAL
COMMUNITY
Start: 2021-02-26

## 2021-03-05 RX ORDER — LISINOPRIL 20 MG/1
TABLET ORAL
COMMUNITY
Start: 2020-09-30

## 2021-03-05 RX ORDER — OSELTAMIVIR PHOSPHATE 30 MG/1
CAPSULE ORAL
COMMUNITY
Start: 2021-01-12 | End: 2021-06-07 | Stop reason: ALTCHOICE

## 2021-03-05 RX ORDER — DEXAMETHASONE 6 MG/1
TABLET ORAL
COMMUNITY
Start: 2021-01-20 | End: 2021-06-07 | Stop reason: ALTCHOICE

## 2021-03-05 RX ORDER — LISINOPRIL 30 MG/1
TABLET ORAL
COMMUNITY
Start: 2020-10-12

## 2021-03-05 RX ORDER — DOCUSATE SODIUM AND SENNOSIDES 8.6; 5 MG/1; MG/1
TABLET ORAL
COMMUNITY
Start: 2021-02-10

## 2021-03-05 RX ORDER — METOPROLOL TARTRATE 75 MG/1
TABLET, FILM COATED ORAL
COMMUNITY
Start: 2021-02-15 | End: 2021-06-07 | Stop reason: SDUPTHER

## 2021-03-05 NOTE — PROGRESS NOTES
Assessment and Plan    #1  Rectal bleeding with constipation and history of colon polyps:  was admitted for this in December, resolved  Possible hemorrhoids, rule out luminal pathology  Patient with occasional constipation  -miralax and senokot as needed  -check hgb, labs given  -plan for colonoscopy to further assess  -CBC, INR 1 week prior, labs given  -monitor for further bleeding  -dulc/golyeltely scripts given to nursing home     #2  Decompensated KRUEGER cirrhosis complicated by portal hypertension, ascites, pancytopenia:  MELD score 13  On diuretics, no fluid overload, no encephalopathy    -continue lasix 20 and aldactone 12 5, if any fluid overload or weight gain can increase as tolerated  -low salt diet  -AFP and US due again in June 2021 for [address]  screening   -MELD labs given  -plan for EGD for variceal screening  --------------------------------------------------------------------------------------------------------------------    Chief Complaint: f/u cirrhosis, rectal bleeding    HPI: Taylor Barrios is a 76 y o  female with a history of neuropathy, hyperlipidemia, diabetes, depression, CVA, and carotid artery stenosis who presents for follow up  We saw patient in the hospital in December for rectal bleeding and she was diagnosed with cirrhosis  The patient is a poor historian  She denies any abdominal pain, nausea, vomiting  She denies any further bleeding since being in the hospital  She reports she has constipation sometimes  She had an EGD and colonoscopy back in 2018 secondary to GI bleeding and anemia at that time which was relatively unremarkable  She had some gastric biopsies performed which were negative for H pylori and she had a colon polyp removed that was benign mucosa  She apparently has a history of cirrhosis even back then but patient and patient's niece both do not remember being told about this  She denies any history of excessive alcohol use or family history of liver disease   She had a paracentesis during admission and is on diuretics now  She denies any heartburn or dysphagia  She reports eating well       Review of Systems:   General: negative for fatigue, fever, night sweats or unexpected weight loss  Psychological: negative for anxiety or depression  Ophthalmic: negative for blurry vision or scleral icterus  ENT: negative for headaches, oral lesions, sore throat, vocal changes or dysphagia  Hematological and Lymphatic: negative for pallor or swollen lymph nodes  Respiratory: negative for cough, shortness of breath or wheezing  Cardiovascular: negative for chest pain, edema or murmur  Gastrointestinal: as mentioned in HPI  Genito-Urinary: negative for dysuria or incontinence  Musculoskeletal: negative for joint pain, joint stiffness or joint swelling  Dermatological: negative for pruritus, rash, or jaundice    Current Medications  Current Outpatient Medications   Medication Sig Dispense Refill    acetaminophen (TYLENOL) 325 mg tablet Take 650 mg by mouth every 4 (four) hours as needed      albuterol (2 5 mg/3 mL) 0 083 % nebulizer solution Take 2 5 mg by nebulization every 4 (four) hours as needed      ascorbic acid (VITAMIN C) 500 MG tablet Take 500 mg by mouth daily      aspirin (ECOTRIN LOW STRENGTH) 81 mg EC tablet Take 81 mg by mouth daily      cholecalciferol (VITAMIN D3) 1,000 units tablet       dextran 70-hypromellose (Artificial Tears) 0 1-0 3 % ophthalmic solution 1 drop every 3 (three) hours as needed      DropSafe Safety Pen Needles 31G X 6 MM MISC       emollient (BIAFINE) cream Apply topically as needed for dry skin      escitalopram (LEXAPRO) 10 mg tablet Take 10 mg by mouth daily      ferrous sulfate 325 (65 Fe) mg tablet Take 325 mg by mouth daily      furosemide (LASIX) 40 mg tablet Take 1 tablet (40 mg total) by mouth daily (Patient taking differently: Take 20 mg by mouth daily ) 30 tablet 0    gabapentin (NEURONTIN) 600 MG tablet Take 600 mg by mouth 3 (three) times a day      glucagon 1 MG injection Inject 1 mg into a muscle once as needed      hydrALAZINE (APRESOLINE) 25 mg tablet Take 75 mg by mouth 3 (three) times a day      insulin aspart (NovoLOG) 100 units/mL injection Inject 15 Units under the skin 3 (three) times a day before meals      insulin glargine (LANTUS SOLOSTAR) 100 units/mL injection pen Inject 34 Units under the skin 2 (two) times a day Every morning and at bedtime      Melatonin 3 MG CAPS Take 3 mg by mouth daily at bedtime      Metoprolol Tartrate 75 MG TABS       polyethylene glycol (MIRALAX) 17 g packet Take 17 g by mouth daily as needed      Senna S 8 6-50 MG per tablet       spironolactone (ALDACTONE) 25 mg tablet 12 5 mg       tamsulosin (FLOMAX) 0 4 mg Take 2 capsules by mouth daily      atenolol (TENORMIN) 50 mg tablet Take 50 mg by mouth daily      bisacodyl (DULCOLAX) 5 mg EC tablet Take 2 tablets (10 mg total) by mouth once for 1 dose Per office instructions 2 tablet 0    dexamethasone (DECADRON) 6 mg tablet       docusate sodium (COLACE) 100 mg capsule Take 100 mg by mouth daily      famciclovir (FAMVIR) 500 mg tablet Take 500 mg by mouth 3 (three) times a day      guaiFENesin (ROBITUSSIN) 100 MG/5ML oral liquid Take 10 mL by mouth every 4 (four) hours as needed      linaGLIPtin 5 MG TABS Take 5 mg by mouth daily      lisinopril (ZESTRIL) 10 mg tablet       lisinopril (ZESTRIL) 20 mg tablet       lisinopril (ZESTRIL) 30 mg tablet       lisinopril (ZESTRIL) 40 mg tablet       loperamide (IMODIUM) 2 mg capsule       meclizine (ANTIVERT) 25 mg tablet       Menthol-Methyl Salicylate ([employer] MUSCLE RUB) 10-15 % CREA Apply 1 application topically 3 (three) times a day Apply to shoulders and neck      metFORMIN (GLUCOPHAGE) 1000 MG tablet Take by mouth every 12 (twelve) hours      metoprolol tartrate (LOPRESSOR) 50 mg tablet Take 75 mg by mouth every 12 (twelve) hours      nystatin (MYCOSTATIN) powder Apply topically 2 (two) times a day 15 g 0    oseltamivir (TAMIFLU) 30 MG capsule       phenazopyridine (PYRIDIUM) 100 mg tablet       polyethylene glycol (GOLYTELY) 4000 mL solution Take 4,000 mL by mouth once for 1 dose Per office instructions 4000 mL 0    polyvinyl alcohol (LIQUIFILM TEARS) 1 4 % ophthalmic solution Administer 1 drop to both eyes as needed      silver sulfadiazine (SILVADENE,SSD) 1 % cream       silver sulfadiazine (SSD) 1 % cream       tuberculin (Tubersol) 5 units/0 1 mL        No current facility-administered medications for this visit  Past Medical History  Past Medical History:   Diagnosis Date    AV block     Benign neoplasm of pituitary gland [facility] ) 10/2/2013    [facility] )     Depression     Diabetes mellitus ([facility] )     Hearing loss     Hyperlipidemia     Neuropathy     Stenosis of carotid artery        Past Surgical History  Past Surgical History:   Procedure Laterality Date    IR PARACENTESIS  12/24/2020    JOINT REPLACEMENT      right knee       Past Social History   Social History     Socioeconomic History    Marital status:       Spouse name: None    Number of children: None    Years of education: None    Highest education level: None   Occupational History    None   Social Needs    Financial resource strain: None    Food insecurity     Worry: None     Inability: None    Transportation needs     Medical: None     Non-medical: None   Tobacco Use    Smoking status: Former Smoker    Smokeless tobacco: Never Used   Substance and Sexual Activity    Alcohol use: Not Currently     Frequency: Never     Drinks per session: Patient refused     Binge frequency: Never    Drug use: Never    Sexual activity: Not Currently   Lifestyle    Physical activity     Days per week: None     Minutes per session: None    Stress: None   Relationships    Social connections     Talks on phone: None     Gets together: None     Attends Alevism service: None Active member of club or organization: None     Attends meetings of clubs or organizations: None     Relationship status: None    Intimate partner violence     Fear of current or ex partner: None     Emotionally abused: None     Physically abused: None     Forced sexual activity: None   Other Topics Concern    None   Social History Narrative    None       The following portions of the patient's history were reviewed and updated as appropriate: allergies, current medications, past family history, past medical history, past social history, past surgical history and problem list     Vital Signs  Vitals:    03/05/21 0931   BP: 116/64       Physical Exam:  General appearance: alert, cooperative, no distress; obese  HEENT: normocephalic, anicteric, no eye erythema or discharge, no oropharyngeal thrush  Neck: supple, trachea midline, no adenopathy  Lungs: CTA b/l, no rales, rhonchi, or wheezing, unlabored respirations  Heart: RRR, murmur present, no rubs, or gallops  Abdomen: soft, obese, non-tender, non-distended, normal bowel sounds, no masses or organomegaly  Rectal: deferred  Extremities: no cyanosis, clubbing; minimal LE edema  Musculoskeletal: normal gait  Skin: color and texture normal, no jaundice, no rashes or lesions  Psychiatric: alert and oriented, normal affect and behavior

## 2021-03-09 ENCOUNTER — TELEPHONE (OUTPATIENT)
Dept: GASTROENTEROLOGY | Facility: AMBULARY SURGERY CENTER | Age: 76
End: 2021-03-09

## 2021-04-20 ENCOUNTER — OFFICE VISIT (OUTPATIENT)
Dept: OBGYN CLINIC | Facility: CLINIC | Age: 76
End: 2021-04-20
Payer: COMMERCIAL

## 2021-04-20 VITALS — SYSTOLIC BLOOD PRESSURE: 124 MMHG | WEIGHT: 194 LBS | BODY MASS INDEX: 35.48 KG/M2 | DIASTOLIC BLOOD PRESSURE: 60 MMHG

## 2021-04-20 DIAGNOSIS — B37.3 VULVAR CANDIDIASIS: Primary | ICD-10-CM

## 2021-04-20 DIAGNOSIS — L30.4 INTERTRIGO: ICD-10-CM

## 2021-04-20 DIAGNOSIS — M79.3 PANNICULITIS: ICD-10-CM

## 2021-04-20 PROBLEM — B37.31 VULVAR CANDIDIASIS: Status: ACTIVE | Noted: 2021-04-20

## 2021-04-20 PROCEDURE — 99203 OFFICE O/P NEW LOW 30 MIN: CPT | Performed by: NURSE PRACTITIONER

## 2021-04-20 RX ORDER — NYSTATIN AND TRIAMCINOLONE ACETONIDE 100000; 1 [USP'U]/G; MG/G
OINTMENT TOPICAL
Qty: 60 G | Refills: 3 | Status: SHIPPED | OUTPATIENT
Start: 2021-04-20 | End: 2021-06-07 | Stop reason: SDUPTHER

## 2021-04-20 RX ORDER — NYSTATIN 100000 [USP'U]/G
POWDER TOPICAL
Qty: 60 G | Refills: 3 | Status: SHIPPED | OUTPATIENT
Start: 2021-04-20 | End: 2021-06-07 | Stop reason: SDUPTHER

## 2021-04-20 RX ORDER — NYSTATIN 100000 U/G
OINTMENT TOPICAL
Qty: 30 G | Refills: 3 | Status: SHIPPED | OUTPATIENT
Start: 2021-04-20

## 2021-04-20 NOTE — ASSESSMENT & PLAN NOTE
Advised nystatin ointment to moist lesions/fissures BID until resolved  Transition to nystatin powder when lesions are healed  Recommended gentle cleaning with soft cloth or gauze

## 2021-04-20 NOTE — ASSESSMENT & PLAN NOTE
Advised mycolog to affected area 2-3 times per day x10 days then as needed  Reviewed conservative vulvar hygiene  Recommended leaving skin open to air as often as possible  Discussed general candida considerations and encouraged compliance with DM2 management plan to optimize glycemic control

## 2021-04-20 NOTE — PROGRESS NOTES
Assessment/Plan:    Panniculitis  Advised nystatin ointment to moist lesions/fissures BID until resolved  Transition to nystatin powder when lesions are healed  Recommended gentle cleaning with soft cloth or gauze  Intertrigo  Advised nystatin powder 1-2 times daily to intact skin after cleaning and thoroughly drying  Discussed leaving skin open to air as often as possible  Vulvar candidiasis  Advised mycolog to affected area 2-3 times per day x10 days then as needed  Reviewed conservative vulvar hygiene  Recommended leaving skin open to air as often as possible  Discussed general candida considerations and encouraged compliance with DM2 management plan to optimize glycemic control  Diagnoses and all orders for this visit:    Vulvar candidiasis  -     nystatin-triamcinolone (MYCOLOG-II) ointment; Apply thin film to affected vaginal area 2-3 times per day x10 days then as needed    Intertrigo  -     nystatin (MYCOSTATIN) powder; Apply to intact pannis skin 1-2 times daily    Panniculitis  -     nystatin (MYCOSTATIN) ointment; Apply thin film to moist fissure lesions within the pannis 2 times daily until resolved          Subjective:      Patient ID: Larna Opitz is a 76 y o  female  This new patient presents with c/o vulvar burning  She is accompanied by an aide from her skilled nursing facility ([facility] [facility]); the aide does not remain present for exam as Ashley Narvaez asked her to step out  Ashley Narvaez is a 76 y o  G0  PMHx is extensive; notable for DM, cirrhosis  Gyn hx benign - denies abn pap or STI  FH neg for breast, ov or colon ca  Ashleyaudelia Narvaez has arrived in a wheelchair  She reports she only has one hearing aid today as the other one is being repaired, thus hearing is poor    Ashley Narvaez states she has had a lot of redness, irritation and burning in her pannis and groin skin folds as well as over vulva  Her caregivers have been applying some kind of ointment which she does not feel is effective  She complaints that her skin hurts when they use a washcloth to remove this   She wears a brief all day and night for urinary leakage   Lalo Childers is not sure what staff has been applying  Med list includes only Aquaphor   Denies bleeding, pelvic pain, discharge, STI risk factors       The following portions of the patient's history were reviewed and updated as appropriate: allergies, current medications, past family history, past medical history, past social history, past surgical history and problem list     Review of Systems   Constitutional: Negative  Respiratory: Negative  Cardiovascular: Negative  Genitourinary: Negative  Vulvar burning    Skin: Negative  Neurological: Negative  Psychiatric/Behavioral: Negative  Objective:      /60   Wt 88 kg (194 lb)   BMI 35 48 kg/m²          Physical Exam  Constitutional:       Appearance: She is well-developed  HENT:      Head: Normocephalic and atraumatic  Eyes:      Pupils: Pupils are equal, round, and reactive to light  Neck:      Musculoskeletal: Normal range of motion  Pulmonary:      Effort: Pulmonary effort is normal    Abdominal:      Hernia: There is no hernia in the left inguinal area or right inguinal area  Genitourinary:     Labia:         Right: No rash, tenderness, lesion or injury  Left: No rash, tenderness, lesion or injury  Vagina: No vaginal discharge, erythema, tenderness or bleeding  Rectum: Normal        Musculoskeletal: Normal range of motion  Skin:     General: Skin is warm and dry  Neurological:      Mental Status: She is alert and oriented to person, place, and time  Comments: Poor hearing    Psychiatric:         Behavior: Behavior normal          Thought Content:  Thought content normal          Judgment: Judgment normal

## 2021-04-20 NOTE — ASSESSMENT & PLAN NOTE
Advised nystatin powder 1-2 times daily to intact skin after cleaning and thoroughly drying  Discussed leaving skin open to air as often as possible

## 2021-06-04 ENCOUNTER — TELEPHONE (OUTPATIENT)
Dept: GASTROENTEROLOGY | Facility: HOSPITAL | Age: 76
End: 2021-06-04

## 2021-06-07 ENCOUNTER — ANESTHESIA EVENT (OUTPATIENT)
Dept: GASTROENTEROLOGY | Facility: HOSPITAL | Age: 76
End: 2021-06-07

## 2021-06-07 ENCOUNTER — ANESTHESIA (OUTPATIENT)
Dept: GASTROENTEROLOGY | Facility: HOSPITAL | Age: 76
End: 2021-06-07

## 2021-06-07 ENCOUNTER — HOSPITAL ENCOUNTER (OUTPATIENT)
Dept: GASTROENTEROLOGY | Facility: HOSPITAL | Age: 76
Setting detail: OUTPATIENT SURGERY
Discharge: HOME | End: 2021-06-07
Attending: INTERNAL MEDICINE | Admitting: INTERNAL MEDICINE
Payer: COMMERCIAL

## 2021-06-07 VITALS
OXYGEN SATURATION: 99 % | DIASTOLIC BLOOD PRESSURE: 70 MMHG | RESPIRATION RATE: 15 BRPM | TEMPERATURE: 97.2 F | BODY MASS INDEX: 35.7 KG/M2 | HEART RATE: 60 BPM | HEIGHT: 62 IN | SYSTOLIC BLOOD PRESSURE: 167 MMHG | WEIGHT: 194 LBS

## 2021-06-07 DIAGNOSIS — K62.5 RECTAL BLEEDING: ICD-10-CM

## 2021-06-07 DIAGNOSIS — D50.9 IRON DEFICIENCY ANEMIA, UNSPECIFIED IRON DEFICIENCY ANEMIA TYPE: Primary | ICD-10-CM

## 2021-06-07 DIAGNOSIS — K74.60 DECOMPENSATED HEPATIC CIRRHOSIS (HCC): ICD-10-CM

## 2021-06-07 DIAGNOSIS — K59.09 OTHER CONSTIPATION: ICD-10-CM

## 2021-06-07 DIAGNOSIS — K29.70 GASTRITIS WITHOUT BLEEDING, UNSPECIFIED CHRONICITY, UNSPECIFIED GASTRITIS TYPE: ICD-10-CM

## 2021-06-07 DIAGNOSIS — Z86.010 HISTORY OF COLON POLYPS: ICD-10-CM

## 2021-06-07 DIAGNOSIS — K72.90 DECOMPENSATED HEPATIC CIRRHOSIS (HCC): ICD-10-CM

## 2021-06-07 LAB — GLUCOSE SERPL-MCNC: 94 MG/DL (ref 65–140)

## 2021-06-07 PROCEDURE — 88305 TISSUE EXAM BY PATHOLOGIST: CPT | Performed by: PATHOLOGY

## 2021-06-07 PROCEDURE — 45385 COLONOSCOPY W/LESION REMOVAL: CPT | Performed by: INTERNAL MEDICINE

## 2021-06-07 PROCEDURE — 43239 EGD BIOPSY SINGLE/MULTIPLE: CPT | Performed by: INTERNAL MEDICINE

## 2021-06-07 PROCEDURE — 45388 COLONOSCOPY W/ABLATION: CPT | Performed by: INTERNAL MEDICINE

## 2021-06-07 PROCEDURE — 82948 REAGENT STRIP/BLOOD GLUCOSE: CPT

## 2021-06-07 RX ORDER — LIDOCAINE HYDROCHLORIDE 20 MG/ML
INJECTION, SOLUTION EPIDURAL; INFILTRATION; INTRACAUDAL; PERINEURAL AS NEEDED
Status: DISCONTINUED | OUTPATIENT
Start: 2021-06-07 | End: 2021-06-07

## 2021-06-07 RX ORDER — PANTOPRAZOLE SODIUM 40 MG/1
40 TABLET, DELAYED RELEASE ORAL
Qty: 60 TABLET | Refills: 0 | Status: SHIPPED | OUTPATIENT
Start: 2021-06-07 | End: 2021-07-07

## 2021-06-07 RX ORDER — PROPOFOL 10 MG/ML
INJECTION, EMULSION INTRAVENOUS AS NEEDED
Status: DISCONTINUED | OUTPATIENT
Start: 2021-06-07 | End: 2021-06-07

## 2021-06-07 RX ORDER — SODIUM CHLORIDE, SODIUM LACTATE, POTASSIUM CHLORIDE, CALCIUM CHLORIDE 600; 310; 30; 20 MG/100ML; MG/100ML; MG/100ML; MG/100ML
INJECTION, SOLUTION INTRAVENOUS CONTINUOUS PRN
Status: DISCONTINUED | OUTPATIENT
Start: 2021-06-07 | End: 2021-06-07

## 2021-06-07 RX ORDER — PANTOPRAZOLE SODIUM 40 MG/1
40 TABLET, DELAYED RELEASE ORAL DAILY
Qty: 90 TABLET | Refills: 0 | Status: SHIPPED | OUTPATIENT
Start: 2021-06-07 | End: 2021-09-05

## 2021-06-07 RX ADMIN — PROPOFOL 20 MG: 10 INJECTION, EMULSION INTRAVENOUS at 10:14

## 2021-06-07 RX ADMIN — PROPOFOL 100 MG: 10 INJECTION, EMULSION INTRAVENOUS at 09:43

## 2021-06-07 RX ADMIN — Medication 40 MG: at 09:56

## 2021-06-07 RX ADMIN — LIDOCAINE HYDROCHLORIDE 20 MG: 20 INJECTION, SOLUTION EPIDURAL; INFILTRATION; INTRACAUDAL at 09:43

## 2021-06-07 RX ADMIN — PROPOFOL 20 MG: 10 INJECTION, EMULSION INTRAVENOUS at 10:20

## 2021-06-07 RX ADMIN — PROPOFOL 30 MG: 10 INJECTION, EMULSION INTRAVENOUS at 10:00

## 2021-06-07 RX ADMIN — SODIUM CHLORIDE, SODIUM LACTATE, POTASSIUM CHLORIDE, AND CALCIUM CHLORIDE: .6; .31; .03; .02 INJECTION, SOLUTION INTRAVENOUS at 09:23

## 2021-06-07 RX ADMIN — PROPOFOL 30 MG: 10 INJECTION, EMULSION INTRAVENOUS at 09:52

## 2021-06-07 RX ADMIN — SODIUM CHLORIDE, SODIUM LACTATE, POTASSIUM CHLORIDE, AND CALCIUM CHLORIDE: .6; .31; .03; .02 INJECTION, SOLUTION INTRAVENOUS at 09:38

## 2021-06-07 RX ADMIN — PROPOFOL 20 MG: 10 INJECTION, EMULSION INTRAVENOUS at 10:08

## 2021-06-07 NOTE — ANESTHESIA PREPROCEDURE EVALUATION
Procedure:  COLONOSCOPY  EGD    Relevant Problems   CARDIO   (+) Cerebrovascular accident (CVA) due to thrombosis of left middle cerebral artery (HCC)   (+) Heart block, AV   (+) Hypertension   (+) Mixed hyperlipidemia   (+) Nonrheumatic aortic (valve) stenosis   (+) Pure hypercholesterolemia   (+) Unspecified right bundle-branch block      ENDO   (+) Type 2 diabetes mellitus (HCC)      GI/HEPATIC   (+) Decompensated hepatic cirrhosis (HCC)   (+) Dysphagia      /RENAL   (+) NEHEMIAS (acute kidney injury) ([facility] )      HEMATOLOGY   (+) Iron deficiency anemia      MUSCULOSKELETAL   (+) Arthritis   (+) Cervical spondylosis without myelopathy      NEURO/PSYCH   (+) Anxiety disorder, unspecified   (+) Cerebrovascular accident (CVA) due to thrombosis of left middle cerebral artery (HCC)   (+) History of CVA (cerebrovascular accident)   (+) History of GI bleed   (+) History of cardiac pacemaker   (+) Other chronic pain   (+) Recurrent major depressive disorder (HCC)   (+) Type 2 diabetes mellitus with diabetic neuropathy, unspecified (HCC)        Physical Exam    Airway    Mallampati score: II  TM Distance: >3 FB  Neck ROM: full     Dental   No notable dental hx upper dentures and lower dentures,     Cardiovascular  Cardiovascular exam normal    Pulmonary  Pulmonary exam normal     Other Findings        Anesthesia Plan  ASA Score- 3     Anesthesia Type- IV sedation with anesthesia with ASA Monitors  Additional Monitors:   Airway Plan:     Comment: Patient has a PPM/Defib placed two years ago  She only recalls that she was getting dizziness episodes prior to the placement  No cardiology records in chart, she doesn't remember the name of her cardiology  But Denies any recent problems with chest pain, palpitations          Plan Factors-Exercise tolerance (METS): >4 METS  Chart reviewed  Imaging results reviewed  Existing labs reviewed  Patient summary reviewed  Patient is not a current smoker       Obstructive sleep apnea risk education given perioperatively  Induction-     Postoperative Plan-     Informed Consent- Anesthetic plan and risks discussed with patient  I personally reviewed this patient with the CRNA  Discussed and agreed on the Anesthesia Plan with the CRNA  Karrie Nissen

## 2021-06-07 NOTE — H&P
History and Physical -  [facility]  Jodie Bee 76 y o  female MRN: [id number]    HPI: Jodie Bee is a 76y o  year old female who presents for evaluation of varices and for evaluation of rectal bleeding  Most recent blood work in a nursing home is from May 17 this and only includes a CBC  No INR is available  Platelets of 15942         Review of Systems    Historical Information   Past Medical History:   Diagnosis Date    Anemia     AV block     Benign neoplasm of [facility] gland ([facility] ) 10/2/2013    Chronic kidney disease     Chronic pain disorder     Cirrhosis of liver (HCC)     CVA (cerebral vascular accident) ([facility] )     Depression     [facility] mellitus ([facility] )     Hearing loss     Hemiparesis due to old cerebrovascular accident ([facility] )     Hyperlipidemia     Neuropathy     Stenosis of carotid artery     Stroke ([place] [address] )      Past Surgical History:   Procedure Laterality Date    CARDIAC PACEMAKER PLACEMENT      IR PARACENTESIS  12/24/2020    JOINT REPLACEMENT      right knee     Social History   Social History     Substance and Sexual Activity   Alcohol Use Not Currently    Frequency: Never    Drinks per session: Patient refused    Binge frequency: Never     Social History     Substance and Sexual Activity   Drug Use Never     Social History     Tobacco Use   Smoking Status Former Smoker   Smokeless Tobacco Never Used     Family History   Problem Relation Age of Onset    No Known Problems Mother     No Known Problems Father        Meds/Allergies     (Not in a hospital admission)      No Known Allergies    Objective     /70   Pulse 68   Temp 97 6 °F (36 4 °C) (Temporal)   Resp 16   Ht 5' 2" (1 575 m)   Wt 88 kg (194 lb)   SpO2 100%   BMI 35 48 kg/m²       PHYSICAL EXAM    Gen: NAD  CV: RRR  CHEST: Clear  ABD: soft, NT/ND  EXT: no edema  Neuro: AAO      ASSESSMENT/PLAN:  This is a 76y o  year old female here for variceal screening and for evaluation of rectal bleeding  PLAN:   Procedure:  EGD with possible variceal banding and colonoscopy

## 2021-06-07 NOTE — ANESTHESIA POSTPROCEDURE EVALUATION
Post-Op Assessment Note    CV Status:  Stable  Pain Score: 0    Pain management: adequate     Mental Status:  Alert and awake   Hydration Status:  Euvolemic   PONV Controlled:  Controlled   Airway Patency:  Patent      Post Op Vitals Reviewed: Yes      Staff: CRNA         No complications documented      BP   134/79   Temp      Pulse  60   Resp   14   SpO2   97%

## 2021-06-08 ENCOUNTER — TELEPHONE (OUTPATIENT)
Dept: GASTROENTEROLOGY | Facility: AMBULARY SURGERY CENTER | Age: 76
End: 2021-06-08

## 2021-06-08 NOTE — TELEPHONE ENCOUNTER
Zoila Blackwell from [facility] called in to schedule 2 week f/u from Colonoscopy and EGD with Dr Moon Welch on 6/7/2021  I told Danial Shane was unable to find an appointment for the patient until August, will call Zoila Blackwell back with an appointment

## 2021-07-08 ENCOUNTER — TELEPHONE (OUTPATIENT)
Dept: GASTROENTEROLOGY | Facility: AMBULARY SURGERY CENTER | Age: 76
End: 2021-07-08

## 2021-07-08 NOTE — TELEPHONE ENCOUNTER
Called Marbella to try to speak with pt, pt could not hear me  Marbella did put her speaker on high and I was very elevated in my voice, but she still could not hear me  I called Marbella back to try to talk to nursing staff, but it rang for quite a while and then a recorded message came on that my call could not be completed  I will try other contact tomorrow

## 2021-08-31 ENCOUNTER — OFFICE VISIT (OUTPATIENT)
Dept: GASTROENTEROLOGY | Facility: AMBULARY SURGERY CENTER | Age: 76
End: 2021-08-31
Payer: COMMERCIAL

## 2021-08-31 VITALS — SYSTOLIC BLOOD PRESSURE: 130 MMHG | DIASTOLIC BLOOD PRESSURE: 66 MMHG

## 2021-08-31 DIAGNOSIS — E66.01 MORBID OBESITY WITH BMI OF 40.0-44.9, ADULT (HCC): ICD-10-CM

## 2021-08-31 DIAGNOSIS — K29.70 GASTRITIS WITHOUT BLEEDING, UNSPECIFIED CHRONICITY, UNSPECIFIED GASTRITIS TYPE: ICD-10-CM

## 2021-08-31 DIAGNOSIS — K74.60 HEPATIC CIRRHOSIS, UNSPECIFIED HEPATIC CIRRHOSIS TYPE, UNSPECIFIED WHETHER ASCITES PRESENT (HCC): Primary | ICD-10-CM

## 2021-08-31 DIAGNOSIS — K55.20 AVM (ARTERIOVENOUS MALFORMATION) OF COLON: ICD-10-CM

## 2021-08-31 DIAGNOSIS — D69.6 THROMBOCYTOPENIA, UNSPECIFIED (HCC): ICD-10-CM

## 2021-08-31 DIAGNOSIS — D64.9 ANEMIA, UNSPECIFIED TYPE: ICD-10-CM

## 2021-08-31 PROCEDURE — 99214 OFFICE O/P EST MOD 30 MIN: CPT | Performed by: INTERNAL MEDICINE

## 2021-08-31 RX ORDER — LANOLIN ALCOHOL/MO/W.PET/CERES
CREAM (GRAM) TOPICAL
COMMUNITY
Start: 2021-07-29

## 2021-08-31 RX ORDER — OMEPRAZOLE 40 MG/1
CAPSULE, DELAYED RELEASE ORAL
COMMUNITY
Start: 2021-07-29

## 2021-08-31 NOTE — PROGRESS NOTES
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,Moderate ascites on imaging previously however no ascites noted on exam   Patient is currently on Lasix 40 mg and Aldactone 12 5 mg daily  Her most recent renal function was preserved with creatinine of 1 16  Would recommend to continue the current dose of diuretics  Patient is not encephalopathic, no reports of sleep disturbances-  Therefore do not suspect hepatic encephalopathy  - no evidence of [address]  on ultrasound in December of 2020  Would recommend repeat ultrasound at this time  Would recommend checking AFP  Variceal screening is up-to-date, would recommend repeat EGD in 1 year for variceal screening  recommend weight loss, BMI of 35  -  Low-sodium diet  2 Tubular adenoma- repeat colonoscopy in June of 2022  Procedure will need to be done in the hospital   Coags, CBC will need to be checked within 1 week of the procedure  3 Moderate gastritis- gastric biopsies were negative for H pylori and intestinal metaplasia, recommend omeprazole 40 mg on daily basis  -  Avoid NSAIDs  4 Normocytic anemia-   No reports of overt bleeding, EGD was notable for gastritis and trace esophageal varices, colonoscopy with multiple colon polyps and  nonbleeding AVM which was cauterized  In absence of overt bleeding, would recommend repeat CBC at this time  Would also recommend iron studies  If patient is noted to be persistently iron deficient, would recommend small bowel evaluation with capsule endoscopy  This was discussed with the patient and her transport  5  nonbleeding AVM- obliterated using APC  Subjective:    this is a 19-year-old female with history of decompensated KRUEGER cirrhosis, neuropathy, hyperlipidemia, depression, diabetes, presents for follow-up  Patient was hospitalized last year with rectal bleeding at which time she was diagnosed with cirrhosis  She underwent EGD and colonoscopy by me in  June of 2021 which was notable for trace esophageal varices, gastritis  Gastric biopsies were negative for H pylori, she was recommended omeprazole 40 mg on daily basis  she also underwent colonoscopy which was notable for multiple colon polyps, left-sided diverticulosis and nonbleeding AVM which was ablated using APC  There was also evidence of internal hemorrhoids  Pathology was consistent with tubular adenomas, patient was recommended repeat at 1 year interval     Patient denies any hematemesis, coffee-ground emesis or melena  She is currently on omeprazole 40 mg for gastritis  No unintentional weight loss, denies any abdominal distention, pruritus or peripheral edema  Objective:     Vitals: Blood pressure 130/66 ,There is no height or weight on file to calculate BMI  [unfilled]    Physical Exam:    GEN: wn/wd, NAD  HEENT: MMM, no cervical or supraclavicular LAD, anciteric  CV: RRR, no m/r/g  CHEST: CTA b/l, no w/r/r  ABD: +BS, soft, NT/ND, no hepatosplenomegaly  EXT: no c/c/e  SKIN: no rashes  NEURO: aaox3      Invasive Devices     None                         Lab, Imaging and other studies:     No visits with results within 1 Day(s) from this visit     Latest known visit with results is:   Hospital Outpatient Visit on 06/07/2021   Component Date Value    POC Glucose 06/07/2021 94     Case Report 06/07/2021                      Value:Surgical Pathology Report                         Case: [id number]                                   Authorizing Provider:  Iona Will MD       Collected:           06/07/2021 0946              Ordering Location:     [facility]        Received:            06/07/2021 [id number] [facility] Endoscopy                                                           Pathologist: Padmaja Jimenez MD                                                   Specimens:   A) - Stomach, gastric, r/o h pylori                                                                 B) - Polyp, Colorectal, ascending, hot snare                                                        C) - Polyp, Colorectal, descending, hot snare                                                       D) - Polyp, Colorectal, sigmoid x2, hot snare                                                       E) - Polyp, Colorectal, recto-sigmoid, hot snare                                           Final Diagnosis 06/07/2021                      Value: This result contains rich text formatting which cannot be displayed here   Additional Information 06/07/2021                      Value: This result contains rich text formatting which cannot be displayed here   Synoptic Checklist 06/07/2021                      Value:  (COLON/RECTUM POLYP FORM - GI - All Specimens)                                                                                                                 : Adenoma(s)                                                         :    Serrated Adenoma      Gross Description 06/07/2021                      Value: This result contains rich text formatting which cannot be displayed here  I have personally reviewed pertinent films in PACS    No current facility-administered medications for this visit

## 2021-08-31 NOTE — LETTER
August 31, 2021     Roberth Eduardo MD  [address]  [address]  [address] [place] [address]    Patient: Ca Subramanian   YOB: 1945   Date of Visit: 8/31/2021       Dear Dr Fransico Cardenas:    Thank you for referring Keena Antoine to me for evaluation  Below are my notes for this consultation  If you have questions, please do not hesitate to call me  I look forward to following your patient along with you  Sincerely,        Rehana Pineda MD        CC: No Recipients  Rehana Pineda MD  8/31/2021  2:38 PM  Sign when Signing Visit  [facility]  Progress Note - Ca Subramanian 68 y o  female MRN: [id number]    Unit/Bed#:  Encounter: [id number]    Assessment/Plan:      1  Decompensated KRUEGER cirrhosis complicated by trace esophageal varices,Moderate ascites on imaging previously however no ascites noted on exam   Patient is currently on Lasix 40 mg and Aldactone 12 5 mg daily  Her most recent renal function was preserved with creatinine of 1 16  Would recommend to continue the current dose of diuretics  Patient is not encephalopathic, no reports of sleep disturbances-  Therefore do not suspect hepatic encephalopathy  - no evidence of [address]  on ultrasound in December of 2020  Would recommend repeat ultrasound at this time  Would recommend checking AFP  Variceal screening is up-to-date, would recommend repeat EGD in 1 year for variceal screening  recommend weight loss, BMI of 35  -  Low-sodium diet  2 Tubular adenoma- repeat colonoscopy in June of 2022  Procedure will need to be done in the hospital   Coags, CBC will need to be checked within 1 week of the procedure  3 Moderate gastritis- gastric biopsies were negative for H pylori and intestinal metaplasia, recommend omeprazole 40 mg on daily basis  -  Avoid NSAIDs      4 Normocytic anemia-   No reports of overt bleeding, EGD was notable for gastritis and trace esophageal varices, colonoscopy with multiple colon polyps and  nonbleeding AVM which was cauterized  In absence of overt bleeding, would recommend repeat CBC at this time  Would also recommend iron studies  If patient is noted to be persistently iron deficient, would recommend small bowel evaluation with capsule endoscopy  This was discussed with the patient and her transport  5  nonbleeding AVM- obliterated using APC  Subjective:    this is a 80-year-old female with history of decompensated KRUEGER cirrhosis, neuropathy, hyperlipidemia, depression, diabetes, presents for follow-up  Patient was hospitalized last year with rectal bleeding at which time she was diagnosed with cirrhosis  She underwent EGD and colonoscopy by me in  June of 2021 which was notable for trace esophageal varices, gastritis  Gastric biopsies were negative for H pylori, she was recommended omeprazole 40 mg on daily basis  she also underwent colonoscopy which was notable for multiple colon polyps, left-sided diverticulosis and nonbleeding AVM which was ablated using APC  There was also evidence of internal hemorrhoids  Pathology was consistent with tubular adenomas, patient was recommended repeat at 1 year interval     Patient denies any hematemesis, coffee-ground emesis or melena  She is currently on omeprazole 40 mg for gastritis  No unintentional weight loss, denies any abdominal distention, pruritus or peripheral edema  Objective:     Vitals: Blood pressure 130/66 ,There is no height or weight on file to calculate BMI  [unfilled]    Physical Exam:    GEN: wn/wd, NAD  HEENT: MMM, no cervical or supraclavicular LAD, anciteric  CV: RRR, no m/r/g  CHEST: CTA b/l, no w/r/r  ABD: +BS, soft, NT/ND, no hepatosplenomegaly  EXT: no c/c/e  SKIN: no rashes  NEURO: aaox3      Invasive Devices     None                         Lab, Imaging and other studies:     No visits with results within 1 Day(s) from this visit     Latest known visit with results is: Hospital Outpatient Visit on 06/07/2021   Component Date Value    POC Glucose 06/07/2021 94     Case Report 06/07/2021                      Value:Surgical Pathology Report                         Case: [id number]                                   Authorizing Provider:  Latonia Coley MD       Collected:           06/07/2021 0946              Ordering Location[facility]     [facility]        Received:            06/07/2021 [address] [facility] Endoscopy                                                           Pathologist:           Bethany Perera MD                                                   Specimens:   A) - Stomach, gastric, r/o h pylori                                                                 B) - Polyp, Colorectal, ascending, hot snare                                                        C) - Polyp, Colorectal, descending, hot snare                                                       D) - Polyp, Colorectal, sigmoid x2, hot snare                                                       E) - Polyp, Colorectal, recto-sigmoid, hot snare                                           Final Diagnosis 06/07/2021                      Value: This result contains rich text formatting which cannot be displayed here   Additional Information 06/07/2021                      Value: This result contains rich text formatting which cannot be displayed here   Synoptic Checklist 06/07/2021                      Value:  (COLON/RECTUM POLYP FORM - GI - All Specimens)                                                                                                                 : Adenoma(s)                                                         :    Serrated Adenoma      Gross Description 06/07/2021                      Value: This result contains rich text formatting which cannot be displayed here           I have personally reviewed pertinent films in PACS    No current facility-administered medications for this visit

## 2021-09-14 ENCOUNTER — HOSPITAL ENCOUNTER (OUTPATIENT)
Dept: ULTRASOUND IMAGING | Facility: HOSPITAL | Age: 76
Discharge: HOME | End: 2021-09-14
Attending: INTERNAL MEDICINE
Payer: COMMERCIAL

## 2021-09-14 DIAGNOSIS — K74.60 HEPATIC CIRRHOSIS, UNSPECIFIED HEPATIC CIRRHOSIS TYPE, UNSPECIFIED WHETHER ASCITES PRESENT (HCC): ICD-10-CM

## 2021-09-14 PROCEDURE — 76705 ECHO EXAM OF ABDOMEN: CPT

## 2021-12-03 ENCOUNTER — OFFICE VISIT (OUTPATIENT)
Dept: GASTROENTEROLOGY | Facility: AMBULARY SURGERY CENTER | Age: 76
End: 2021-12-03
Payer: COMMERCIAL

## 2021-12-03 VITALS
SYSTOLIC BLOOD PRESSURE: 118 MMHG | HEIGHT: 62 IN | DIASTOLIC BLOOD PRESSURE: 62 MMHG | WEIGHT: 214 LBS | BODY MASS INDEX: 39.38 KG/M2

## 2021-12-03 DIAGNOSIS — K74.60 HEPATIC CIRRHOSIS, UNSPECIFIED HEPATIC CIRRHOSIS TYPE, UNSPECIFIED WHETHER ASCITES PRESENT (HCC): Primary | ICD-10-CM

## 2021-12-03 PROCEDURE — 99214 OFFICE O/P EST MOD 30 MIN: CPT | Performed by: PHYSICIAN ASSISTANT

## 2021-12-03 RX ORDER — INSULIN ASPART 100 [IU]/ML
INJECTION, SOLUTION INTRAVENOUS; SUBCUTANEOUS
COMMUNITY
Start: 2021-11-17

## 2022-04-20 ENCOUNTER — OFFICE VISIT (OUTPATIENT)
Dept: GASTROENTEROLOGY | Facility: AMBULARY SURGERY CENTER | Age: 77
End: 2022-04-20
Payer: COMMERCIAL

## 2022-04-20 VITALS — HEART RATE: 60 BPM | DIASTOLIC BLOOD PRESSURE: 58 MMHG | SYSTOLIC BLOOD PRESSURE: 132 MMHG | OXYGEN SATURATION: 98 %

## 2022-04-20 DIAGNOSIS — I85.10 SECONDARY ESOPHAGEAL VARICES WITHOUT BLEEDING (HCC): ICD-10-CM

## 2022-04-20 DIAGNOSIS — K74.60 CIRRHOSIS OF LIVER WITHOUT ASCITES, UNSPECIFIED HEPATIC CIRRHOSIS TYPE (HCC): Primary | ICD-10-CM

## 2022-04-20 DIAGNOSIS — Z86.010 HISTORY OF COLON POLYPS: ICD-10-CM

## 2022-04-20 PROCEDURE — 99214 OFFICE O/P EST MOD 30 MIN: CPT | Performed by: PHYSICIAN ASSISTANT

## 2022-04-20 RX ORDER — METOPROLOL TARTRATE 75 MG/1
TABLET, FILM COATED ORAL
COMMUNITY
Start: 2022-02-24

## 2022-04-20 RX ORDER — INSULIN LISPRO 100 [IU]/ML
INJECTION, SOLUTION INTRAVENOUS; SUBCUTANEOUS
COMMUNITY
Start: 2022-03-10

## 2022-04-20 RX ORDER — GABAPENTIN 300 MG/1
CAPSULE ORAL
COMMUNITY
Start: 2022-02-24

## 2022-04-20 NOTE — PROGRESS NOTES
Follow-up Note -  [facility]  Riley Jean Pierre 1945 68 y o  female         Reason:  Follow-up; Brown cirrhosis, history of colon polyps    HPI:  59-year-old female with history of stroke, chronic kidney disease, diabetes, BMI 44, wheelchair-bound nursing home resident who presents from nursing home accompanied only by transporter for follow-up  She saw me in the office in December, at that time for follow-up of cirrhosis, she also had history of esophageal varices and colon polyps and was assessed to be due for EGD and colonoscopy in the summer of this year, and was recommended to be seen in the office a couple of months beforehand to reassess medically prior to making plans  She was also ordered for AFP and ultrasound for [place] [address]  surveillance; it appears she did not get ultrasound done but AFP was found to be normal in December 2021  Most recent lab work SC available from February shows hemoglobin 8 6, platelets 29197, white blood cell count 2 3  MELD score calculated from labs obtained in December 2021 and February 2022 is 12  Patient tells me she feels well at this time, she denies any abdominal pain, she says she does get constipated sometimes and has to strain, but never goes more than 1 day without a bowel movement  She takes omeprazole 40 mg once daily  Does not take diuretics, denies any swelling in her extremities, any shortness of breath or any abdominal swelling  REVIEW OF SYSTEMS:      CONSTITUTIONAL: Denies any fever, chills, or rigors  Good appetite, and no recent weight loss  HEENT: No earache or tinnitus  Denies hearing loss or visual disturbances  CARDIOVASCULAR: No chest pain or palpitations  RESPIRATORY: Denies any cough, hemoptysis, shortness of breath or dyspnea on exertion  GASTROINTESTINAL: As noted in the History of Present Illness  GENITOURINARY: No problems with urination  Denies any hematuria or dysuria    NEUROLOGIC: No dizziness or vertigo, denies headaches  MUSCULOSKELETAL: Denies any muscle or joint pain  SKIN: Denies skin rashes or itching  ENDOCRINE: Denies excessive thirst  Denies intolerance to heat or cold  PSYCHOSOCIAL: Denies depression or anxiety  Denies any recent memory loss  Past Medical History:   Diagnosis Date    Anemia     AV block     Benign neoplasm of pituitary gland (HCC) 10/2/2013    Chronic kidney disease     Chronic pain disorder     Cirrhosis of liver (HCC)     [facility]re[facility]     Depression     [facility]     Hearing loss     Hemiparesis due to old cerebrovascular accident [facility] )     Hyperlipidemia     Neuropathy     Stenosis of carotid artery     Stroke [facility])       Past Surgical History:   Procedure Laterality Date    CARDIAC PACEMAKER PLACEMENT      IR PARACENTESIS  12/24/2020    JOINT REPLACEMENT      right knee     Social History     Socioeconomic History    Marital status:      Spouse name: Not on file    Number of children: Not on file    Years of education: Not on file    Highest education level: Not on file   Occupational History    Not on file   Tobacco Use    Smoking status: Former Smoker    Smokeless tobacco: Never Used   Vaping Use    Vaping Use: Never used   Substance and Sexual Activity    Alcohol use: Not Currently    Drug use: Never    Sexual activity: Not Currently   Other Topics Concern    Not on file   Social History Narrative    Not on file     Social Determinants of Health     Financial Resource Strain: Not on file   Food Insecurity: Not on file   Transportation Needs: Not on file   Physical Activity: Not on file   Stress: Not on file   Social Connections: Not on file   Intimate Partner Violence: Not on file   Housing Stability: Not on file     Family History   Problem Relation Age of Onset    No Known Problems Mother     No Known Problems Father      Patient has no known allergies    Current Outpatient Medications   Medication Sig Dispense Refill    acetaminophen (TYLENOL) 325 mg tablet Take 650 mg by mouth every 4 (four) hours as needed      ascorbic acid (VITAMIN C) 500 MG tablet Take 500 mg by mouth daily      aspirin (ECOTRIN LOW STRENGTH) 81 mg EC tablet Take 81 mg by mouth daily      cholecalciferol (VITAMIN D3) 1,000 units tablet       dextran 70-hypromellose (Artificial Tears) 0 1-0 3 % ophthalmic solution 1 drop every 3 (three) hours as needed        escitalopram (LEXAPRO) 10 mg tablet Take 10 mg by mouth daily      ferrous sulfate 325 (65 Fe) mg tablet Take 325 mg by mouth daily      furosemide (LASIX) 40 mg tablet Take 1 tablet (40 mg total) by mouth daily (Patient taking differently: Take 20 mg by mouth daily ) 30 tablet 0    gabapentin (NEURONTIN) 300 mg capsule       glucagon 1 MG injection Inject 1 mg into a muscle once as needed      HumaLOG KwikPen 100 units/mL injection pen       insulin aspart (NovoLOG) 100 units/mL injection Inject 15 Units under the skin 3 (three) times a day before meals      Melatonin 3 MG CAPS Take 3 mg by mouth daily at bedtime      Metoprolol Tartrate 75 MG TABS       omeprazole (PriLOSEC) 40 MG capsule       polyethylene glycol (MIRALAX) 17 g packet Take 17 g by mouth daily as needed      polyvinyl alcohol (LIQUIFILM TEARS) 1 4 % ophthalmic solution Administer 1 drop to both eyes as needed        spironolactone (ALDACTONE) 25 mg tablet 12 5 mg       tamsulosin (FLOMAX) 0 4 mg Take 2 capsules by mouth daily      albuterol (2 5 mg/3 mL) 0 083 % nebulizer solution Take 2 5 mg by nebulization every 4 (four) hours as needed (Patient not taking: Reported on 8/31/2021)      bisacodyl (DULCOLAX) 5 mg EC tablet Take 2 tablets (10 mg total) by mouth once for 1 dose Per office instructions 2 tablet 0    diphenhydrAMINE (BENADRYL) 12 5 mg/5 mL oral liquid Take by mouth 4 (four) times a day as needed for allergies (Patient not taking: Reported on 4/20/2022 )      docusate sodium (COLACE) 100 mg capsule Take 100 mg by mouth daily (Patient not taking: Reported on 8/31/2021)      DropSafe Safety Pen Needles 31G X 6 MM MISC  (Patient not taking: Reported on 12/3/2021 )      emollient (BIAFINE) cream Apply topically as needed for dry skin (Patient not taking: Reported on 8/31/2021)      famciclovir (FAMVIR) 500 mg tablet Take 500 mg by mouth 3 (three) times a day      gabapentin (NEURONTIN) 600 MG tablet Take 600 mg by mouth 3 (three) times a day (Patient not taking: Reported on 4/20/2022 )      guaiFENesin (ROBITUSSIN) 100 MG/5ML oral liquid Take 10 mL by mouth every 4 (four) hours as needed   (Patient not taking: Reported on 4/20/2022 )      hydrALAZINE (APRESOLINE) 25 mg tablet Take 75 mg by mouth 3 (three) times a day (Patient not taking: Reported on 8/31/2021)      insulin aspart, w/niacinamide, (FIASP) 100 Units/mL injection pen Inject 8 Units under the skin (Patient not taking: Reported on 8/31/2021 )      insulin glargine (LANTUS SOLOSTAR) 100 units/mL injection pen Inject 34 Units under the skin 2 (two) times a day Every morning and at bedtime (Patient not taking: Reported on 4/20/2022 )      linaGLIPtin 5 MG TABS Take 5 mg by mouth daily (Patient not taking: Reported on 8/31/2021)      lisinopril (ZESTRIL) 10 mg tablet  (Patient not taking: Reported on 8/31/2021)      lisinopril (ZESTRIL) 20 mg tablet  (Patient not taking: Reported on 8/31/2021)      lisinopril (ZESTRIL) 30 mg tablet  (Patient not taking: Reported on 8/31/2021)      lisinopril (ZESTRIL) 40 mg tablet  (Patient not taking: Reported on 8/31/2021)      loperamide (IMODIUM) 2 mg capsule  (Patient not taking: Reported on 8/31/2021)      meclizine (ANTIVERT) 25 mg tablet  (Patient not taking: Reported on 8/31/2021)      melatonin 3 mg  (Patient not taking: Reported on 8/31/2021 )      Menthol-Methyl Salicylate ([employer] MUSCLE RUB) 10-15 % CREA Apply 1 application topically 3 (three) times a day Apply to shoulders and neck (Patient not taking: Reported on 12/3/2021 )      metFORMIN (GLUCOPHAGE) 1000 MG tablet Take by mouth every 12 (twelve) hours (Patient not taking: Reported on 12/3/2021 )      metoprolol tartrate (LOPRESSOR) 50 mg tablet Take 75 mg by mouth every 12 (twelve) hours      NovoLOG FlexPen 100 units/mL injection pen  (Patient not taking: Reported on 4/20/2022 )      nystatin (MYCOSTATIN) ointment Apply thin film to moist fissure lesions within the pannis 2 times daily until resolved (Patient not taking: Reported on 12/3/2021 ) 30 g 3    nystatin (MYCOSTATIN) powder Apply topically 2 (two) times a day (Patient not taking: Reported on 8/31/2021) 15 g 0    pantoprazole (PROTONIX) 40 mg tablet Take 1 tablet (40 mg total) by mouth 2 (two) times a day before meals 60 tablet 0    pantoprazole (PROTONIX) 40 mg tablet Take 1 tablet (40 mg total) by mouth daily (Patient not taking: Reported on 8/31/2021) 90 tablet 0    polyethylene glycol (GOLYTELY) 4000 mL solution Take 4,000 mL by mouth once for 1 dose Per office instructions 4000 mL 0    Senna S 8 6-50 MG per tablet  (Patient not taking: Reported on 4/20/2022 )      silver sulfadiazine (SILVADENE,SSD) 1 % cream  (Patient not taking: Reported on 8/31/2021)       No current facility-administered medications for this visit  Blood pressure 132/58, pulse 60, SpO2 98 %  PHYSICAL EXAM:      General Appearance:   Alert, wheelchair bound, cooperative, no distress, appears stated age    HEENT:   Normocephalic, atraumatic, anicteric      Neck:  Supple, symmetrical, trachea midline, no adenopathy;    thyroid: no enlargement/tenderness/nodules; no carotid  bruit or JVD    Lungs:   Clear to auscultation bilaterally; no rales, rhonchi or wheezing; respirations unlabored    Heart[de-identified]   Systolic murmur,  regular rate and rhythm; no murmur, rub, or gallop     Abdomen:   Soft, obese, non-tender, non-distended; normal bowel sounds; no masses, no organomegaly  Extremities: No edema, erythema, wounds, rashes   Rectal:   Deferred                      Lab Results   Component Value Date    WBC 2 48 (L) 12/23/2020    HGB 8 2 (L) 12/23/2020    HCT 27 9 (L) 12/23/2020    MCV 88 12/23/2020    PLT 82 (L) 12/23/2020     Lab Results   Component Value Date    CALCIUM 8 5 12/23/2020    K 5 0 12/23/2020    CO2 23 12/23/2020     (H) 12/23/2020    BUN 41 (H) 12/23/2020    CREATININE 1 42 (H) 12/23/2020     Lab Results   Component Value Date    ALT 15 12/23/2020    AST 21 12/23/2020    ALKPHOS 75 12/23/2020     Lab Results   Component Value Date    INR 1 31 (H) 12/23/2020    INR 1 23 (H) 12/22/2020    PROTIME 16 4 (H) 12/23/2020    PROTIME 15 7 (H) 12/22/2020       US right upper quadrant    Result Date: 9/15/2021  Impression: Hepatic cirrhosis and mild steatosis Cholecystectomy Workstation performed: [id number]       ASSESSMENT & PLAN:    Hepatic cirrhosis (HCC)  Also has significant history of colon polyps with poor prep on last colonoscopy, is due for colorectal cancer screening  Regarding cirrhosis, etiology felt to be KRUEGER, she is [facility] (no imaging within the last 6 months, but recent normal AFP), MELD score 12 based on most recent labs, does not appear fluid overloaded, does not appear encephalopathic  Will be due soon for esophageal variceal surveillance      - plan for EGD and colonoscopy to be done in the hospital this summer, June or later    -check right upper quadrant ultrasound     -procedures were explained in detail to the patient at this time    -will check CBC, CMP and PT INR 1 week prior to endoscopic examinations, she has significant thrombocytopenia and could potentially require platelet transfusions preoperatively    - regarding her constipation, recommended she can take 17 g MiraLax once daily as needed for constipation, this can also be taken on a daily basis if needed

## 2022-04-20 NOTE — ASSESSMENT & PLAN NOTE
Also has significant history of colon polyps with poor prep on last colonoscopy, is due for colorectal cancer screening  Regarding cirrhosis, etiology felt to be KRUEGER, she is partially [facility] (no imaging within the last 6 months, but recent normal AFP), MELD score 12 based on most recent labs, does not appear fluid overloaded, does not appear encephalopathic  Will be due soon for esophageal variceal surveillance      - plan for EGD and colonoscopy to be done in the hospital this summer, June or later    -check right upper quadrant ultrasound     -procedures were explained in detail to the patient at this time    -will check CBC, CMP and PT INR 1 week prior to endoscopic examinations, she has significant thrombocytopenia and could potentially require platelet transfusions preoperatively    - regarding her constipation, recommended she can take 17 g MiraLax once daily as needed for constipation, this can also be taken on a daily basis if needed

## 2022-05-03 NOTE — PATIENT INSTRUCTIONS
Scheduled date of EGD/colonoscopy (as of today):8/4/2022  Physician performing EGD/colonoscopy:DR YAO  Location of EGD[facility]colonoscopy[facility]  Desired bowel prep reviewed with patient:DONALDO SIMMS PA-C  Instructions reviewed with patient by:LEXIE YOUNG  Clearances:  PREP INSTRUCTIONS FAXED TO [facility] [place] @ [address] [address]

## 2022-07-11 ENCOUNTER — TELEPHONE (OUTPATIENT)
Dept: GASTROENTEROLOGY | Facility: CLINIC | Age: 77
End: 2022-07-11

## 2022-07-11 NOTE — TELEPHONE ENCOUNTER
Re-faxed Golytely prep instructions to [facility] using fax # [phone]  [facility] contact number is [phone] for any further questions

## 2022-08-03 NOTE — TELEPHONE ENCOUNTER
Left message returning patient's call  Patient will need to be done in the GI Lab  Next available date is not until 11/21  Direct line given

## 2022-08-03 NOTE — TELEPHONE ENCOUNTER
Pt needs to reschedule her procedure that was cancelled on 8-4-22    [facility] call back # [phone]

## 2022-08-12 ENCOUNTER — TELEPHONE (OUTPATIENT)
Dept: GASTROENTEROLOGY | Facility: AMBULARY SURGERY CENTER | Age: 77
End: 2022-08-12

## 2022-08-12 NOTE — TELEPHONE ENCOUNTER
Boo christie/ Dia Tiwari from [facility] to schedule  Patient is scheduled for colonoscopy and EGD on December 12 , 2022 at [facility] with [address] [place] [address]  Dion Cabrera MD  Patient is aware of pre-procedure prep of Golytley/Dulcolax and they will be called the day prior between 2 and 6 pm for time to report for procedure  Pre-procedure prep has been given to the patient  FAXED BOWEL PREP AND DIABETIC INSTRUCTIONS TO [facility]/GOLYTELY/DULCOLAX on August 12 , 2022

## 2022-11-08 ENCOUNTER — OFFICE VISIT (OUTPATIENT)
Dept: OBGYN CLINIC | Facility: CLINIC | Age: 77
End: 2022-11-08

## 2022-11-08 ENCOUNTER — APPOINTMENT (OUTPATIENT)
Dept: RADIOLOGY | Facility: AMBULARY SURGERY CENTER | Age: 77
End: 2022-11-08
Attending: STUDENT IN AN ORGANIZED HEALTH CARE EDUCATION/TRAINING PROGRAM

## 2022-11-08 VITALS
BODY MASS INDEX: 39.38 KG/M2 | SYSTOLIC BLOOD PRESSURE: 96 MMHG | WEIGHT: 214 LBS | HEART RATE: 59 BPM | DIASTOLIC BLOOD PRESSURE: 63 MMHG | HEIGHT: 62 IN

## 2022-11-08 DIAGNOSIS — M25.562 CHRONIC PAIN OF LEFT KNEE: ICD-10-CM

## 2022-11-08 DIAGNOSIS — G89.29 CHRONIC PAIN OF LEFT KNEE: ICD-10-CM

## 2022-11-08 DIAGNOSIS — M25.562 CHRONIC PAIN OF LEFT KNEE: Primary | ICD-10-CM

## 2022-11-08 DIAGNOSIS — G89.29 CHRONIC PAIN OF LEFT KNEE: Primary | ICD-10-CM

## 2022-11-08 DIAGNOSIS — M70.50 PES ANSERINE BURSITIS: ICD-10-CM

## 2022-11-08 RX ORDER — ROPIVACAINE HYDROCHLORIDE 5 MG/ML
10 INJECTION, SOLUTION EPIDURAL; INFILTRATION; PERINEURAL
Status: COMPLETED | OUTPATIENT
Start: 2022-11-08 | End: 2022-11-08

## 2022-11-08 RX ORDER — METHYLPREDNISOLONE ACETATE 40 MG/ML
1 INJECTION, SUSPENSION INTRA-ARTICULAR; INTRALESIONAL; INTRAMUSCULAR; SOFT TISSUE
Status: COMPLETED | OUTPATIENT
Start: 2022-11-08 | End: 2022-11-08

## 2022-11-08 RX ADMIN — ROPIVACAINE HYDROCHLORIDE 10 ML: 5 INJECTION, SOLUTION EPIDURAL; INFILTRATION; PERINEURAL at 12:35

## 2022-11-08 RX ADMIN — METHYLPREDNISOLONE ACETATE 1 ML: 40 INJECTION, SUSPENSION INTRA-ARTICULAR; INTRALESIONAL; INTRAMUSCULAR; SOFT TISSUE at 12:35

## 2022-11-08 NOTE — PROGRESS NOTES
Orthopaedics Office Visit - new Patient Visit    ASSESSMENT/PLAN:    Assessment:   1  Left pes anserine bursitis   2  Left knee osteoarthritis, primary  Plan:   1  Patient was offered a corticosteroid injection for her left pes anserine bursitis  Patient accepted and risk of medications discussed  It was well tolerated by the patient   2  The patient be weight-bearing as tolerated to left lower extremity  Continue with range of motion as tolerated   3  Continue taking Tylenol as needed for pain  Patient can not participate with physical therapy at her rehab facility for left lower extremity strengthening  4  Patient follow-up as needed basis     To Do Next Visit:  None    _____________________________________________________  CHIEF COMPLAINT:  Chief Complaint   Patient presents with   • Left Knee - Pain     Pain started 5-6 weeks ago         SUBJECTIVE:  Evelyn Hsieh is a 68 y o  female who presents with approximately a 3 week history of left-sided knee pain  Patient ate is present with her and states that 1 of the aides was transferring her from 1 chair to a bed when she felt a twisting of her left knee  She has continued to have left anterior knee pain just medial to the tibial tubercle  Denies any pain before this incident  Denies any fevers, chills  Patient has history of neuropathy in bilateral lower extremities with decreased sensation which is her baseline  Last hemoglobin A1c was within normal limits        PAST MEDICAL HISTORY:  Past Medical History:   Diagnosis Date   • Anemia    • AV block    • Benign neoplasm of pituitary gland [facility] 10/2/2013   • Chronic kidney disease    • Chronic pain disorder    • Cirrhosis of liver (HCC)    • CVA (cerebral vascular accident) ([facility] )    • Depression    • Diabetes mellitus ([facility] )    • Hearing loss    • Hemiparesis due to old cerebrovascular accident [facility])    • Hyperlipidemia    • Neuropathy    • Stenosis of carotid artery    • Stroke [facility])        PAST SURGICAL HISTORY:  Past Surgical History:   Procedure Laterality Date   • CARDIAC PACEMAKER PLACEMENT     • IR PARACENTESIS  12/24/2020   • JOINT REPLACEMENT      right knee       FAMILY HISTORY:  Family History   Problem Relation Age of Onset   • No Known Problems Mother    • No Known Problems Father        SOCIAL HISTORY:  Social History     Tobacco Use   • Smoking status: Former Smoker   • Smokeless tobacco: Never Used   Vaping Use   • Vaping Use: Never used   Substance Use Topics   • Alcohol use: Not Currently   • Drug use: Never       MEDICATIONS:    Current Outpatient Medications:   •  acetaminophen (TYLENOL) 325 mg tablet, Take 650 mg by mouth every 4 (four) hours as needed, Disp: , Rfl:   •  ascorbic acid (VITAMIN C) 500 MG tablet, Take 500 mg by mouth daily, Disp: , Rfl:   •  aspirin (ECOTRIN LOW STRENGTH) 81 mg EC tablet, Take 81 mg by mouth daily, Disp: , Rfl:   •  cholecalciferol (VITAMIN D3) 1,000 units tablet, , Disp: , Rfl:   •  dextran 70-hypromellose (Artificial Tears) 0 1-0 3 % ophthalmic solution, 1 drop every 3 (three) hours as needed  , Disp: , Rfl:   •  escitalopram (LEXAPRO) 10 mg tablet, Take 10 mg by mouth daily, Disp: , Rfl:   •  furosemide (LASIX) 40 mg tablet, Take 1 tablet (40 mg total) by mouth daily (Patient taking differently: Take 20 mg by mouth daily), Disp: 30 tablet, Rfl: 0  •  gabapentin (NEURONTIN) 300 mg capsule, , Disp: , Rfl:   •  glucagon 1 MG injection, Inject 1 mg into a muscle once as needed, Disp: , Rfl:   •  HumaLOG KwikPen 100 units/mL injection pen, , Disp: , Rfl:   •  insulin aspart (NovoLOG) 100 units/mL injection, Inject 15 Units under the skin 3 (three) times a day before meals, Disp: , Rfl:   •  metoprolol tartrate (LOPRESSOR) 50 mg tablet, Take 75 mg by mouth every 12 (twelve) hours, Disp: , Rfl:   •  Metoprolol Tartrate 75 MG TABS, , Disp: , Rfl:   •  spironolactone (ALDACTONE) 25 mg tablet, 12 5 mg , Disp: , Rfl:   •  tamsulosin (FLOMAX) 0 4 mg, Take 2 capsules by mouth daily, Disp: , Rfl:   •  albuterol (2 5 mg/3 mL) 0 083 % nebulizer solution, Take 2 5 mg by nebulization every 4 (four) hours as needed (Patient not taking: No sig reported), Disp: , Rfl:   •  bisacodyl (DULCOLAX) 5 mg EC tablet, Take 2 tablets (10 mg total) by mouth once for 1 dose Per office instructions, Disp: 2 tablet, Rfl: 0  •  diphenhydrAMINE (BENADRYL) 12 5 mg/5 mL oral liquid, Take by mouth 4 (four) times a day as needed for allergies (Patient not taking: No sig reported), Disp: , Rfl:   •  docusate sodium (COLACE) 100 mg capsule, Take 100 mg by mouth daily (Patient not taking: No sig reported), Disp: , Rfl:   •  DropSafe Safety Pen Needles 31G X 6 MM MISC, , Disp: , Rfl:   •  emollient (BIAFINE) cream, Apply topically as needed for dry skin (Patient not taking: No sig reported), Disp: , Rfl:   •  famciclovir (FAMVIR) 500 mg tablet, Take 500 mg by mouth 3 (three) times a day, Disp: , Rfl:   •  ferrous sulfate 325 (65 Fe) mg tablet, Take 325 mg by mouth daily, Disp: , Rfl:   •  gabapentin (NEURONTIN) 600 MG tablet, Take 600 mg by mouth 3 (three) times a day (Patient not taking: No sig reported), Disp: , Rfl:   •  guaiFENesin (ROBITUSSIN) 100 MG/5ML oral liquid, Take 10 mL by mouth every 4 (four) hours as needed   (Patient not taking: No sig reported), Disp: , Rfl:   •  hydrALAZINE (APRESOLINE) 25 mg tablet, Take 75 mg by mouth 3 (three) times a day (Patient not taking: No sig reported), Disp: , Rfl:   •  insulin aspart, w/niacinamide, (FIASP) 100 Units/mL injection pen, Inject 8 Units under the skin (Patient not taking: No sig reported), Disp: , Rfl:   •  insulin glargine (LANTUS SOLOSTAR) 100 units/mL injection pen, Inject 34 Units under the skin 2 (two) times a day Every morning and at bedtime (Patient not taking: No sig reported), Disp: , Rfl:   •  linaGLIPtin 5 MG TABS, Take 5 mg by mouth daily (Patient not taking: No sig reported), Disp: , Rfl:   •  lisinopril (ZESTRIL) 10 mg tablet, , Disp: , Rfl:   •  lisinopril (ZESTRIL) 20 mg tablet, , Disp: , Rfl:   •  lisinopril (ZESTRIL) 30 mg tablet, , Disp: , Rfl:   •  lisinopril (ZESTRIL) 40 mg tablet, , Disp: , Rfl:   •  loperamide (IMODIUM) 2 mg capsule, , Disp: , Rfl:   •  meclizine (ANTIVERT) 25 mg tablet, , Disp: , Rfl:   •  Melatonin 3 MG CAPS, Take 3 mg by mouth daily at bedtime, Disp: , Rfl:   •  melatonin 3 mg, , Disp: , Rfl:   •  Menthol-Methyl Salicylate ([employer] MUSCLE RUB) 10-15 % CREA, Apply 1 application topically 3 (three) times a day Apply to shoulders and neck (Patient not taking: No sig reported), Disp: , Rfl:   •  metFORMIN (GLUCOPHAGE) 1000 MG tablet, Take by mouth every 12 (twelve) hours (Patient not taking: No sig reported), Disp: , Rfl:   •  NovoLOG FlexPen 100 units/mL injection pen, , Disp: , Rfl:   •  nystatin (MYCOSTATIN) ointment, Apply thin film to moist fissure lesions within the pannis 2 times daily until resolved (Patient not taking: No sig reported), Disp: 30 g, Rfl: 3  •  nystatin (MYCOSTATIN) powder, Apply topically 2 (two) times a day (Patient not taking: No sig reported), Disp: 15 g, Rfl: 0  •  omeprazole (PriLOSEC) 40 MG capsule, , Disp: , Rfl:   •  pantoprazole (PROTONIX) 40 mg tablet, Take 1 tablet (40 mg total) by mouth daily (Patient not taking: Reported on 8/31/2021), Disp: 90 tablet, Rfl: 0  •  polyethylene glycol (GOLYTELY) 4000 mL solution, Take 4,000 mL by mouth once for 1 dose Per office instructions, Disp: 4000 mL, Rfl: 0  •  polyethylene glycol (MIRALAX) 17 g packet, Take 17 g by mouth daily as needed, Disp: , Rfl:   •  polyvinyl alcohol (LIQUIFILM TEARS) 1 4 % ophthalmic solution, Administer 1 drop to both eyes as needed  , Disp: , Rfl:   •  Senna S 8 6-50 MG per tablet, , Disp: , Rfl:   •  silver sulfadiazine (SILVADENE,SSD) 1 % cream, , Disp: , Rfl:     ALLERGIES:  No Known Allergies    REVIEW OF SYSTEMS:  MSK:  Left knee pain  Neuro:  No numbness or paresthesia  Pertinent items are otherwise noted in HPI    A comprehensive review of systems was otherwise negative  LABS:  HgA1c:   Lab Results   Component Value Date    HGBA1C 5 6 10/12/2022     BMP:   Lab Results   Component Value Date    CALCIUM 8 5 12/23/2020    K 5 0 12/23/2020    CO2 23 12/23/2020     (H) 12/23/2020    BUN 41 (H) 12/23/2020    CREATININE 1 42 (H) 12/23/2020     CBC: No components found for: CBC    _____________________________________________________  PHYSICAL EXAMINATION:  Vital signs: BP 96/63   Pulse 59   Ht 5' 2" (1 575 m)   Wt 97 1 kg (214 lb)   BMI 39 14 kg/m²   General: No acute distress, awake and alert  Psychiatric: Mood and affect appear appropriate  HEENT: Trachea Midline, No torticollis, no apparent facial trauma  Cardiovascular: No audible murmurs; Extremities appear perfused  Pulmonary: No audible wheezing or stridor  Skin: No open lesions; see further details (if any) below    MUSCULOSKELETAL EXAMINATION:  Extremities:  Left knee was exposed inspected  Skin overlying the left knee is intact without any erythema  There is no effusion present  Patient has range of motion from 0-110 degrees of flexion  Patient has tenderness to palpation over the pes anserine tendons and bursa  She has no tenderness over the medial or lateral joint lines  Some tenderness over the patellar tendon inferiorly  No palpable gap in the patella tendon and patient is able to perform a straight leg raise  Patient has global decreased sensation in the bilateral lower extremities up to about the ankle level  Signs of venous stasis present with scaling of the skin       _____________________________________________________  STUDIES REVIEWED:  I personally reviewed the images and interpretation is as follows:  X-rays of the left knee demonstrate tricompartmental moderate osteoarthritis with varus alignment of the knee  No acute fractures dislocations  No osseous lesions      PROCEDURES PERFORMED:  Large joint arthrocentesis: L pes anserine bursa  Universal Protocol:  Consent: Verbal consent obtained  Risks and benefits: risks, benefits and alternatives were discussed  Consent given by: patient  Patient understanding: patient states understanding of the procedure being performed  Site marked: the operative site was marked  Radiology Images displayed and confirmed   If images not available, report reviewed: imaging studies available  Patient identity confirmed: verbally with patient    Supporting Documentation  Indications: pain   Procedure Details  Location: knee - L pes anserine bursa  Preparation: Patient was prepped and draped in the usual sterile fashion  Needle size: 22 G  Ultrasound guidance: no  Approach: medial  Medications administered: 1 mL methylPREDNISolone acetate 40 mg/mL; 10 mL ropivacaine 0 5 %    Patient tolerance: patient tolerated the procedure well with no immediate complications  Dressing:  Sterile dressing applied          Jaquelin Garcia

## 2022-12-02 ENCOUNTER — TELEPHONE (OUTPATIENT)
Dept: OTHER | Facility: OTHER | Age: 77
End: 2022-12-02

## 2022-12-02 NOTE — TELEPHONE ENCOUNTER
Called [facility] multiple times  No answer at the nursing station  Patient US was ordered  Please have this done at anytime

## 2022-12-02 NOTE — TELEPHONE ENCOUNTER
Nurse calling on behalf of Tanner De La Torre, unclear if patient is suppose to receive ordered ultrasound prior to colonoscopy on 12/12  Please follow up with patient

## 2022-12-11 ENCOUNTER — TELEPHONE (OUTPATIENT)
Dept: OTHER | Facility: OTHER | Age: 77
End: 2022-12-11

## 2022-12-11 NOTE — TELEPHONE ENCOUNTER
Patient is calling regarding cancelling a procedure  Date/Time: 12/12/22 @ 0800    Performing Physician: Dr Arabella Guerrero Supervisor Notified?:  Pamela Hauser [] NO [x]    Patient requesting call back to reschedule: YES [] NO [x]    Nursing home called in stating patient is refusing to do her colonoscopy prep and does not want to get her colonoscopy procedure done  Nurse suggested just canceling the procedure  Appointment was not cancelled out of Epic

## 2022-12-12 ENCOUNTER — TELEPHONE (OUTPATIENT)
Dept: GASTROENTEROLOGY | Facility: CLINIC | Age: 77
End: 2022-12-12

## 2022-12-12 ENCOUNTER — PREP FOR PROCEDURE (OUTPATIENT)
Dept: GASTROENTEROLOGY | Facility: CLINIC | Age: 77
End: 2022-12-12

## 2022-12-12 DIAGNOSIS — K74.60 CIRRHOSIS OF LIVER WITHOUT ASCITES, UNSPECIFIED HEPATIC CIRRHOSIS TYPE (HCC): Primary | ICD-10-CM

## 2022-12-12 DIAGNOSIS — Z86.010 HISTORY OF COLON POLYPS: ICD-10-CM

## 2022-12-12 DIAGNOSIS — K59.09 OTHER CONSTIPATION: ICD-10-CM

## 2022-12-12 DIAGNOSIS — K74.60 HEPATIC CIRRHOSIS, UNSPECIFIED HEPATIC CIRRHOSIS TYPE, UNSPECIFIED WHETHER ASCITES PRESENT (HCC): ICD-10-CM

## 2022-12-12 DIAGNOSIS — K55.20 AVM (ARTERIOVENOUS MALFORMATION) OF COLON: ICD-10-CM

## 2022-12-12 NOTE — TELEPHONE ENCOUNTER
Scheduled date of EGD/colonoscopy (as of today):2/27/23  Physician performing EGD/colonoscopy:DR YOA  Location of EGD[facility]colonoscopy:[facility]  Clearances:  NA    PT IS RESCHEDULED

## 2023-02-24 ENCOUNTER — TELEPHONE (OUTPATIENT)
Dept: GASTROENTEROLOGY | Facility: CLINIC | Age: 78
End: 2023-02-24

## 2023-02-24 NOTE — TELEPHONE ENCOUNTER
Amira from [place] called regarding patient's PT INR prior to colonoscopy on Monday  Would like to know if colonoscopy can proceed without it  Patient will need to be rescheduled if this is an issue  She can be reached at [phone]

## 2023-03-03 ENCOUNTER — HOSPITAL ENCOUNTER (INPATIENT)
Facility: HOSPITAL | Age: 78
LOS: 3 days | Discharge: NURSING, REHAB OR LONG-TERM CARE | DRG: 291 | End: 2023-03-07
Attending: EMERGENCY MEDICINE | Admitting: INTERNAL MEDICINE
Payer: COMMERCIAL

## 2023-03-03 ENCOUNTER — APPOINTMENT (EMERGENCY)
Dept: RADIOLOGY | Facility: HOSPITAL | Age: 78
DRG: 291 | End: 2023-03-03
Payer: COMMERCIAL

## 2023-03-03 DIAGNOSIS — I21.4 NSTEMI (NON-ST ELEVATED MYOCARDIAL INFARCTION) (HCC): Primary | ICD-10-CM

## 2023-03-03 DIAGNOSIS — I35.0 NONRHEUMATIC AORTIC (VALVE) STENOSIS: ICD-10-CM

## 2023-03-03 DIAGNOSIS — E78.5 HYPERLIPIDEMIA: ICD-10-CM

## 2023-03-03 DIAGNOSIS — R07.9 CHEST PAIN, UNSPECIFIED TYPE: ICD-10-CM

## 2023-03-03 DIAGNOSIS — E11.3293 TYPE 2 DIABETES MELLITUS WITH MILD NONPROLIFERATIVE RETINOPATHY OF BOTH EYES WITHOUT MACULAR EDEMA, UNSPECIFIED WHETHER LONG TERM INSULIN USE (HCC): ICD-10-CM

## 2023-03-03 PROBLEM — R77.8 ELEVATED TROPONIN: Status: ACTIVE | Noted: 2023-03-03

## 2023-03-03 PROBLEM — R79.89 ELEVATED TROPONIN: Status: ACTIVE | Noted: 2023-03-03

## 2023-03-03 LAB
2HR DELTA HS TROPONIN: 3 NG/L
4HR DELTA HS TROPONIN: 5 NG/L
ALBUMIN SERPL BCP-MCNC: 3.8 G/DL (ref 3.5–5)
ALP SERPL-CCNC: 58 U/L (ref 34–104)
ALT SERPL W P-5'-P-CCNC: 11 U/L (ref 7–52)
ANION GAP SERPL CALCULATED.3IONS-SCNC: 10 MMOL/L (ref 4–13)
APTT PPP: 28 SECONDS (ref 23–37)
AST SERPL W P-5'-P-CCNC: 20 U/L (ref 13–39)
BASOPHILS # BLD AUTO: 0.03 THOUSANDS/ÂΜL (ref 0–0.1)
BASOPHILS NFR BLD AUTO: 1 % (ref 0–1)
BILIRUB SERPL-MCNC: 0.59 MG/DL (ref 0.2–1)
BUN SERPL-MCNC: 42 MG/DL (ref 5–25)
CALCIUM SERPL-MCNC: 9 MG/DL (ref 8.4–10.2)
CARDIAC TROPONIN I PNL SERPL HS: 62 NG/L
CARDIAC TROPONIN I PNL SERPL HS: 65 NG/L
CARDIAC TROPONIN I PNL SERPL HS: 67 NG/L
CHLORIDE SERPL-SCNC: 103 MMOL/L (ref 96–108)
CHOLEST SERPL-MCNC: 107 MG/DL
CO2 SERPL-SCNC: 25 MMOL/L (ref 21–32)
CREAT SERPL-MCNC: 1.81 MG/DL (ref 0.6–1.3)
EOSINOPHIL # BLD AUTO: 0.09 THOUSAND/ÂΜL (ref 0–0.61)
EOSINOPHIL NFR BLD AUTO: 2 % (ref 0–6)
ERYTHROCYTE [DISTWIDTH] IN BLOOD BY AUTOMATED COUNT: 16.3 % (ref 11.6–15.1)
ERYTHROCYTE [DISTWIDTH] IN BLOOD BY AUTOMATED COUNT: 16.3 % (ref 11.6–15.1)
GFR SERPL CREATININE-BSD FRML MDRD: 26 ML/MIN/1.73SQ M
GLUCOSE SERPL-MCNC: 152 MG/DL (ref 65–140)
HCT VFR BLD AUTO: 25.4 % (ref 34.8–46.1)
HCT VFR BLD AUTO: 26.5 % (ref 34.8–46.1)
HDLC SERPL-MCNC: 24 MG/DL
HGB BLD-MCNC: 8 G/DL (ref 11.5–15.4)
HGB BLD-MCNC: 8.2 G/DL (ref 11.5–15.4)
IMM GRANULOCYTES # BLD AUTO: 0.04 THOUSAND/UL (ref 0–0.2)
IMM GRANULOCYTES NFR BLD AUTO: 1 % (ref 0–2)
INR PPP: 1.34 (ref 0.84–1.19)
LDLC SERPL CALC-MCNC: 32 MG/DL (ref 0–100)
LYMPHOCYTES # BLD AUTO: 0.74 THOUSANDS/ÂΜL (ref 0.6–4.47)
LYMPHOCYTES NFR BLD AUTO: 16 % (ref 14–44)
MCH RBC QN AUTO: 30.7 PG (ref 26.8–34.3)
MCH RBC QN AUTO: 30.9 PG (ref 26.8–34.3)
MCHC RBC AUTO-ENTMCNC: 30.9 G/DL (ref 31.4–37.4)
MCHC RBC AUTO-ENTMCNC: 31.5 G/DL (ref 31.4–37.4)
MCV RBC AUTO: 98 FL (ref 82–98)
MCV RBC AUTO: 99 FL (ref 82–98)
MONOCYTES # BLD AUTO: 0.41 THOUSAND/ÂΜL (ref 0.17–1.22)
MONOCYTES NFR BLD AUTO: 9 % (ref 4–12)
NEUTROPHILS # BLD AUTO: 3.29 THOUSANDS/ÂΜL (ref 1.85–7.62)
NEUTS SEG NFR BLD AUTO: 71 % (ref 43–75)
NRBC BLD AUTO-RTO: 0 /100 WBCS
PLATELET # BLD AUTO: 78 THOUSANDS/UL (ref 149–390)
PLATELET # BLD AUTO: 79 THOUSANDS/UL (ref 149–390)
PMV BLD AUTO: 10.3 FL (ref 8.9–12.7)
PMV BLD AUTO: 10.5 FL (ref 8.9–12.7)
POTASSIUM SERPL-SCNC: 4.6 MMOL/L (ref 3.5–5.3)
PROT SERPL-MCNC: 7.1 G/DL (ref 6.4–8.4)
PROTHROMBIN TIME: 16.9 SECONDS (ref 11.6–14.5)
RBC # BLD AUTO: 2.59 MILLION/UL (ref 3.81–5.12)
RBC # BLD AUTO: 2.67 MILLION/UL (ref 3.81–5.12)
SODIUM SERPL-SCNC: 138 MMOL/L (ref 135–147)
TRIGL SERPL-MCNC: 257 MG/DL
WBC # BLD AUTO: 4.6 THOUSAND/UL (ref 4.31–10.16)
WBC # BLD AUTO: 4.87 THOUSAND/UL (ref 4.31–10.16)

## 2023-03-03 PROCEDURE — 99285 EMERGENCY DEPT VISIT HI MDM: CPT | Performed by: PHYSICIAN ASSISTANT

## 2023-03-03 PROCEDURE — 85027 COMPLETE CBC AUTOMATED: CPT | Performed by: PHYSICIAN ASSISTANT

## 2023-03-03 PROCEDURE — 99285 EMERGENCY DEPT VISIT HI MDM: CPT

## 2023-03-03 PROCEDURE — 96375 TX/PRO/DX INJ NEW DRUG ADDON: CPT

## 2023-03-03 PROCEDURE — 93005 ELECTROCARDIOGRAM TRACING: CPT

## 2023-03-03 PROCEDURE — 85379 FIBRIN DEGRADATION QUANT: CPT | Performed by: PHYSICIAN ASSISTANT

## 2023-03-03 PROCEDURE — 96365 THER/PROPH/DIAG IV INF INIT: CPT

## 2023-03-03 PROCEDURE — 80061 LIPID PANEL: CPT | Performed by: PHYSICIAN ASSISTANT

## 2023-03-03 PROCEDURE — 80053 COMPREHEN METABOLIC PANEL: CPT | Performed by: EMERGENCY MEDICINE

## 2023-03-03 PROCEDURE — 85025 COMPLETE CBC W/AUTO DIFF WBC: CPT | Performed by: EMERGENCY MEDICINE

## 2023-03-03 PROCEDURE — 83036 HEMOGLOBIN GLYCOSYLATED A1C: CPT | Performed by: PHYSICIAN ASSISTANT

## 2023-03-03 PROCEDURE — 71045 X-RAY EXAM CHEST 1 VIEW: CPT

## 2023-03-03 PROCEDURE — 1123F ACP DISCUSS/DSCN MKR DOCD: CPT | Performed by: INTERNAL MEDICINE

## 2023-03-03 PROCEDURE — 85730 THROMBOPLASTIN TIME PARTIAL: CPT | Performed by: PHYSICIAN ASSISTANT

## 2023-03-03 PROCEDURE — 84484 ASSAY OF TROPONIN QUANT: CPT | Performed by: EMERGENCY MEDICINE

## 2023-03-03 PROCEDURE — 85610 PROTHROMBIN TIME: CPT | Performed by: PHYSICIAN ASSISTANT

## 2023-03-03 PROCEDURE — 99223 1ST HOSP IP/OBS HIGH 75: CPT | Performed by: PHYSICIAN ASSISTANT

## 2023-03-03 PROCEDURE — 36415 COLL VENOUS BLD VENIPUNCTURE: CPT

## 2023-03-03 RX ORDER — HEPARIN SODIUM 1000 [USP'U]/ML
4000 INJECTION, SOLUTION INTRAVENOUS; SUBCUTANEOUS ONCE
Status: COMPLETED | OUTPATIENT
Start: 2023-03-03 | End: 2023-03-03

## 2023-03-03 RX ORDER — HEPARIN SODIUM 1000 [USP'U]/ML
4000 INJECTION, SOLUTION INTRAVENOUS; SUBCUTANEOUS EVERY 6 HOURS PRN
Status: DISCONTINUED | OUTPATIENT
Start: 2023-03-03 | End: 2023-03-04

## 2023-03-03 RX ORDER — HEPARIN SODIUM 10000 [USP'U]/100ML
3-20 INJECTION, SOLUTION INTRAVENOUS
Status: DISCONTINUED | OUTPATIENT
Start: 2023-03-03 | End: 2023-03-04

## 2023-03-03 RX ORDER — HEPARIN SODIUM 1000 [USP'U]/ML
2000 INJECTION, SOLUTION INTRAVENOUS; SUBCUTANEOUS EVERY 6 HOURS PRN
Status: DISCONTINUED | OUTPATIENT
Start: 2023-03-03 | End: 2023-03-04

## 2023-03-03 RX ADMIN — TICAGRELOR 180 MG: 90 TABLET ORAL at 21:52

## 2023-03-03 RX ADMIN — HEPARIN SODIUM 4000 UNITS: 1000 INJECTION INTRAVENOUS; SUBCUTANEOUS at 22:19

## 2023-03-03 RX ADMIN — HEPARIN SODIUM 11.1 UNITS/KG/HR: 10000 INJECTION, SOLUTION INTRAVENOUS at 22:19

## 2023-03-03 NOTE — LETTER
FAX      To:     [facility]:  Phone:       Fax:        Email:        From:   SUKI Martinez  Phone:            Fax:    Email:      ________________________________________________    Patient to return today - transport requested for 12:30pm, but awaiting confirmation  RN to call report  AVS attached  NOTICE:  This communication, including attachments, may contain information that is confidential and protected by the -client or other privilege(s)

## 2023-03-04 ENCOUNTER — APPOINTMENT (OUTPATIENT)
Dept: CT IMAGING | Facility: HOSPITAL | Age: 78
DRG: 291 | End: 2023-03-04
Payer: COMMERCIAL

## 2023-03-04 PROBLEM — R09.02 HYPOXIA: Status: ACTIVE | Noted: 2023-03-04

## 2023-03-04 LAB
BNP SERPL-MCNC: 430 PG/ML (ref 0–100)
D DIMER PPP FEU-MCNC: 0.72 UG/ML FEU
EST. AVERAGE GLUCOSE BLD GHB EST-MCNC: 123 MG/DL
GLUCOSE SERPL-MCNC: 129 MG/DL (ref 65–140)
GLUCOSE SERPL-MCNC: 133 MG/DL (ref 65–140)
GLUCOSE SERPL-MCNC: 196 MG/DL (ref 65–140)
GLUCOSE SERPL-MCNC: 229 MG/DL (ref 65–140)
GLUCOSE SERPL-MCNC: 268 MG/DL (ref 65–140)
HBA1C MFR BLD: 5.9 %
PROCALCITONIN SERPL-MCNC: 0.15 NG/ML

## 2023-03-04 PROCEDURE — 99222 1ST HOSP IP/OBS MODERATE 55: CPT | Performed by: INTERNAL MEDICINE

## 2023-03-04 PROCEDURE — 83880 ASSAY OF NATRIURETIC PEPTIDE: CPT | Performed by: PHYSICIAN ASSISTANT

## 2023-03-04 PROCEDURE — 99232 SBSQ HOSP IP/OBS MODERATE 35: CPT | Performed by: INTERNAL MEDICINE

## 2023-03-04 PROCEDURE — 82948 REAGENT STRIP/BLOOD GLUCOSE: CPT

## 2023-03-04 PROCEDURE — 71250 CT THORAX DX C-: CPT

## 2023-03-04 PROCEDURE — G1004 CDSM NDSC: HCPCS

## 2023-03-04 PROCEDURE — 87081 CULTURE SCREEN ONLY: CPT | Performed by: PHYSICIAN ASSISTANT

## 2023-03-04 PROCEDURE — 84145 PROCALCITONIN (PCT): CPT | Performed by: PHYSICIAN ASSISTANT

## 2023-03-04 RX ORDER — ONDANSETRON 2 MG/ML
4 INJECTION INTRAMUSCULAR; INTRAVENOUS EVERY 6 HOURS PRN
Status: DISCONTINUED | OUTPATIENT
Start: 2023-03-04 | End: 2023-03-07 | Stop reason: HOSPADM

## 2023-03-04 RX ORDER — GABAPENTIN 300 MG/1
300 CAPSULE ORAL 2 TIMES DAILY
Status: DISCONTINUED | OUTPATIENT
Start: 2023-03-04 | End: 2023-03-07 | Stop reason: HOSPADM

## 2023-03-04 RX ORDER — ASPIRIN 81 MG/1
81 TABLET ORAL DAILY
Status: DISCONTINUED | OUTPATIENT
Start: 2023-03-04 | End: 2023-03-07 | Stop reason: HOSPADM

## 2023-03-04 RX ORDER — FUROSEMIDE 40 MG/1
40 TABLET ORAL DAILY
Status: DISCONTINUED | OUTPATIENT
Start: 2023-03-04 | End: 2023-03-04

## 2023-03-04 RX ORDER — LANOLIN ALCOHOL/MO/W.PET/CERES
3 CREAM (GRAM) TOPICAL
Status: DISCONTINUED | OUTPATIENT
Start: 2023-03-04 | End: 2023-03-07 | Stop reason: HOSPADM

## 2023-03-04 RX ORDER — ACETAMINOPHEN 325 MG/1
650 TABLET ORAL EVERY 4 HOURS PRN
Status: DISCONTINUED | OUTPATIENT
Start: 2023-03-04 | End: 2023-03-07 | Stop reason: HOSPADM

## 2023-03-04 RX ORDER — FUROSEMIDE 10 MG/ML
40 INJECTION INTRAMUSCULAR; INTRAVENOUS
Status: DISCONTINUED | OUTPATIENT
Start: 2023-03-04 | End: 2023-03-05

## 2023-03-04 RX ORDER — TAMSULOSIN HYDROCHLORIDE 0.4 MG/1
0.8 CAPSULE ORAL DAILY
Status: DISCONTINUED | OUTPATIENT
Start: 2023-03-04 | End: 2023-03-07 | Stop reason: HOSPADM

## 2023-03-04 RX ORDER — NITROGLYCERIN 0.4 MG/1
0.4 TABLET SUBLINGUAL
Status: DISCONTINUED | OUTPATIENT
Start: 2023-03-04 | End: 2023-03-07 | Stop reason: HOSPADM

## 2023-03-04 RX ORDER — MELATONIN
1000 DAILY
Status: DISCONTINUED | OUTPATIENT
Start: 2023-03-04 | End: 2023-03-07 | Stop reason: HOSPADM

## 2023-03-04 RX ORDER — POLYETHYLENE GLYCOL 3350 17 G/17G
17 POWDER, FOR SOLUTION ORAL DAILY PRN
Status: DISCONTINUED | OUTPATIENT
Start: 2023-03-04 | End: 2023-03-07 | Stop reason: HOSPADM

## 2023-03-04 RX ORDER — ESCITALOPRAM OXALATE 10 MG/1
10 TABLET ORAL DAILY
Status: DISCONTINUED | OUTPATIENT
Start: 2023-03-04 | End: 2023-03-07 | Stop reason: HOSPADM

## 2023-03-04 RX ORDER — PANTOPRAZOLE SODIUM 40 MG/1
40 TABLET, DELAYED RELEASE ORAL
Status: DISCONTINUED | OUTPATIENT
Start: 2023-03-04 | End: 2023-03-07 | Stop reason: HOSPADM

## 2023-03-04 RX ORDER — FERROUS SULFATE 325(65) MG
325 TABLET ORAL DAILY
Status: DISCONTINUED | OUTPATIENT
Start: 2023-03-04 | End: 2023-03-07 | Stop reason: HOSPADM

## 2023-03-04 RX ORDER — INSULIN GLARGINE 100 [IU]/ML
38 INJECTION, SOLUTION SUBCUTANEOUS EVERY 12 HOURS SCHEDULED
Status: DISCONTINUED | OUTPATIENT
Start: 2023-03-04 | End: 2023-03-07 | Stop reason: HOSPADM

## 2023-03-04 RX ORDER — SPIRONOLACTONE 25 MG/1
25 TABLET ORAL DAILY
Status: DISCONTINUED | OUTPATIENT
Start: 2023-03-04 | End: 2023-03-07 | Stop reason: HOSPADM

## 2023-03-04 RX ORDER — INSULIN LISPRO 100 [IU]/ML
52 INJECTION, SOLUTION INTRAVENOUS; SUBCUTANEOUS
Status: DISCONTINUED | OUTPATIENT
Start: 2023-03-04 | End: 2023-03-07 | Stop reason: HOSPADM

## 2023-03-04 RX ORDER — ASCORBIC ACID 500 MG
500 TABLET ORAL DAILY
Status: DISCONTINUED | OUTPATIENT
Start: 2023-03-04 | End: 2023-03-07 | Stop reason: HOSPADM

## 2023-03-04 RX ADMIN — PANTOPRAZOLE SODIUM 40 MG: 40 TABLET, DELAYED RELEASE ORAL at 05:27

## 2023-03-04 RX ADMIN — INSULIN GLARGINE 38 UNITS: 100 INJECTION, SOLUTION SUBCUTANEOUS at 10:21

## 2023-03-04 RX ADMIN — SPIRONOLACTONE 25 MG: 25 TABLET ORAL at 09:07

## 2023-03-04 RX ADMIN — MELATONIN 3 MG: at 21:55

## 2023-03-04 RX ADMIN — INSULIN LISPRO 52 UNITS: 100 INJECTION, SOLUTION INTRAVENOUS; SUBCUTANEOUS at 09:11

## 2023-03-04 RX ADMIN — FERROUS SULFATE TAB 325 MG (65 MG ELEMENTAL FE) 325 MG: 325 (65 FE) TAB at 09:07

## 2023-03-04 RX ADMIN — TAMSULOSIN HYDROCHLORIDE 0.8 MG: 0.4 CAPSULE ORAL at 09:06

## 2023-03-04 RX ADMIN — FUROSEMIDE 40 MG: 10 INJECTION, SOLUTION INTRAMUSCULAR; INTRAVENOUS at 17:57

## 2023-03-04 RX ADMIN — INSULIN GLARGINE 38 UNITS: 100 INJECTION, SOLUTION SUBCUTANEOUS at 01:02

## 2023-03-04 RX ADMIN — FUROSEMIDE 40 MG: 10 INJECTION, SOLUTION INTRAMUSCULAR; INTRAVENOUS at 04:24

## 2023-03-04 RX ADMIN — METOPROLOL TARTRATE 75 MG: 50 TABLET, FILM COATED ORAL at 01:02

## 2023-03-04 RX ADMIN — OXYCODONE HYDROCHLORIDE AND ACETAMINOPHEN 500 MG: 500 TABLET ORAL at 09:07

## 2023-03-04 RX ADMIN — CHOLECALCIFEROL TAB 25 MCG (1000 UNIT) 1000 UNITS: 25 TAB at 09:07

## 2023-03-04 RX ADMIN — INSULIN LISPRO 52 UNITS: 100 INJECTION, SOLUTION INTRAVENOUS; SUBCUTANEOUS at 17:42

## 2023-03-04 RX ADMIN — FUROSEMIDE 40 MG: 10 INJECTION, SOLUTION INTRAMUSCULAR; INTRAVENOUS at 09:07

## 2023-03-04 RX ADMIN — INSULIN LISPRO 52 UNITS: 100 INJECTION, SOLUTION INTRAVENOUS; SUBCUTANEOUS at 12:25

## 2023-03-04 RX ADMIN — GABAPENTIN 300 MG: 300 CAPSULE ORAL at 17:44

## 2023-03-04 RX ADMIN — MELATONIN 3 MG: at 01:02

## 2023-03-04 RX ADMIN — ESCITALOPRAM 10 MG: 10 TABLET, FILM COATED ORAL at 09:07

## 2023-03-04 RX ADMIN — ASPIRIN 81 MG: 81 TABLET, COATED ORAL at 09:06

## 2023-03-04 RX ADMIN — METOPROLOL TARTRATE 75 MG: 50 TABLET, FILM COATED ORAL at 09:11

## 2023-03-04 RX ADMIN — GABAPENTIN 300 MG: 300 CAPSULE ORAL at 09:07

## 2023-03-04 RX ADMIN — INSULIN GLARGINE 38 UNITS: 100 INJECTION, SOLUTION SUBCUTANEOUS at 21:56

## 2023-03-04 NOTE — ASSESSMENT & PLAN NOTE
Lab Results   Component Value Date    HGBA1C 5 9 (H) 03/03/2023       Recent Labs     03/04/23  0240 03/04/23  0815   POCGLU 229* 268*       Blood Sugar Average: Last 72 hrs:  · (P) 248  5well controlled per most recent hgb a1c  · Continue home insulin regimen  · Sliding scale initiated

## 2023-03-04 NOTE — H&P
[facility]  H&P- Jennifer Mikie 1945, 68 y o  female MRN: [id number]  Unit/Bed#: S -01 Encounter: [id number]  Primary Care Provider: Isamar Perez MD   Date and time admitted to hospital: 3/3/2023  7:01 PM    Addendum 0107: contacted by RN  Patient is somewhat SOB, O2 saturations mid to high 80s on RA  Also w/ new hemoptysis-- sputum is pink-red, no clots  Given constellation of sx, PE and PNA are on differential  Patient has CKD w/  Baseline Cr of 1 8 and GFR <30  Will provide supplemental O2 & titrate to goal SPO2 of 90%  Will obtain STAT CT chest wo contrast to eval for possible PNA  Will obtain D-Dimer  If elevated, will maintain heparin gtt & pursue V/Q scan w/ close monitoring of hemoptysis  If D dimer is negative (adjusted for age), would hold heparin gtt and monitor for further hemoptysis      * Chest pain  Assessment & Plan  · Patient w/ acute episode of near-syncope while showering; L sided CP also noticed + SOB  · EKG: a paced, LBBB  · 0hr trop 62; 2hr trop 65  · Maintain telemetry  · Heparin gtt initiated in the ED-- continue for now  · ASA, statin, nitro PRN  · On BB  · Check hgb a1c; lipid panel w/ hypertriglyceridemia   · Cardiology consult-- appreciate input    Elevated troponin  Assessment & Plan  · Initial troponin 62; 2hr 65  · Elevated, but trending flat  · NSTEMI vs  Non-MI elevated troponin  · See primary problem for plan of treatment    Nonrheumatic aortic (valve) stenosis  Assessment & Plan  · Severe per most recent Echo  · Follows w/ cardiology-- no surgical intervention, poor candidate  · Medical management    History of CVA (cerebrovascular accident)  Assessment & Plan  · Continue w/ risk stratification    Hypertension  Assessment & Plan  · BP acceptable  · Continue home BP medication regimen    Type 2 diabetes mellitus ([facility] [address] )  Assessment & Plan  Lab Results   Component Value Date    HGBA1C 5 5 02/02/2023       No results for input(s): POCGLU in the last 72 hours  Blood Sugar Average: Last 72 hrs:  · well controlled per most recent hgb a1c  · Continue home insulin regimen  · SSI for correction w/ QID accuchecks    History of cardiac pacemaker  Assessment & Plan  · In setting of hx of AV block    Iron deficiency anemia  Assessment & Plan  · hgb 8 0-- at baseline  · Hx of AVM/GIB  · Monitor while on heparin gtt    VTE Pharmacologic Prophylaxis: VTE Score: 5 High Risk (Score >/= 5) - Pharmacological DVT Prophylaxis Ordered: heparin drip  Sequential Compression Devices Ordered  Code Status: Level 3 - DNAR and DNI   Discussion with family: dtr aware of admission, but not at bedside during examination  Anticipated Length of Stay: Patient will be admitted on an observation basis with an anticipated length of stay of less than 2 midnights secondary to cardiology evaluation  Total Time Spent on Date of Encounter in care of patient: 40 minutes This time was spent on one or more of the following: performing physical exam; counseling and coordination of care; obtaining or reviewing history; documenting in the medical record; reviewing/ordering tests, medications or procedures; communicating with other healthcare professionals and discussing with patient's family/caregivers  Chief Complaint: syncope, chest pain    History of Present Illness:  Nadeen Rosario is a 68 y o  female with a PMH of AS, HTN, DM, HLD who presents with chest pain  Hx obtained from ED records, patient is [facility] and is a poor historian  Patient lives at [place]  She was showering today and had a near syncopal episode  No loss of consciousness or fall  She reports having dull L sided chest pain w/ near syncope  Sx ongoing for about 2 hours  Non-radiating  Resolved w/ ASA and SL NTG x2  Patient reports chronic L wrist pain and also abdominal pain/constipation  She denies SOB, N/V/D  Review of Systems:  Review of Systems   Constitutional: Negative  HENT: Negative  Eyes: Negative  Respiratory: Negative  Cardiovascular: Positive for chest pain  Gastrointestinal: Positive for anal bleeding and constipation  Genitourinary: Negative  Musculoskeletal: Positive for arthralgias  Skin: Negative  Neurological: Positive for dizziness  Hematological: Negative  Psychiatric/Behavioral: Negative  Past Medical and Surgical History:   Past Medical History:   Diagnosis Date   • Anemia    • AV block    • Benign neoplasm of pituitary gland ([facility] ) 10/2/2013   • Chronic kidney disease    • Chronic pain disorder    • Cirrhosis of liver (HCC)    • CVA (cerebral vascular accident) ([place] [address] )    • Depression    • Diabetes mellitus ([place] [address] )    • Hearing loss    • Hemiparesis due to old cerebrovascular accident [facility])    • Hyperlipidemia    • Neuropathy    • Stenosis of carotid artery    • Stroke [facility])        Past Surgical History:   Procedure Laterality Date   • CARDIAC PACEMAKER PLACEMENT     • IR PARACENTESIS  12/24/2020   • JOINT REPLACEMENT      right knee       Meds/Allergies:  Prior to Admission medications    Medication Sig Start Date End Date Taking?  Authorizing Provider   acetaminophen (TYLENOL) 325 mg tablet Take 650 mg by mouth every 4 (four) hours as needed    Historical Provider, MD   albuterol (2 5 mg/3 mL) 0 083 % nebulizer solution Take 2 5 mg by nebulization every 4 (four) hours as needed  Patient not taking: No sig reported    Historical Provider, MD   ascorbic acid (VITAMIN C) 500 MG tablet Take 500 mg by mouth daily    Historical Provider, MD   aspirin (ECOTRIN LOW STRENGTH) 81 mg EC tablet Take 81 mg by mouth daily    Historical Provider, MD   bisacodyl (DULCOLAX) 5 mg EC tablet Take 2 tablets (10 mg total) by mouth once for 1 dose Per office instructions 3/5/21 3/5/21  Marcel Lopez PA-C   cholecalciferol (VITAMIN D3) 1,000 units tablet  1/31/21   Historical Provider, MD   dextran 70-hypromellose (Artificial Tears) 0 1-0 3 % ophthalmic solution 1 drop every 3 (three) hours as needed      Historical Provider, MD   diphenhydrAMINE (BENADRYL) 12 5 mg/5 mL oral liquid Take by mouth 4 (four) times a day as needed for allergies  Patient not taking: No sig reported    Historical Provider, MD   docusate sodium (COLACE) 100 mg capsule Take 100 mg by mouth daily  Patient not taking: No sig reported    Historical Provider, MD   DropSafe Safety Pen Needles 31G X 6 MM MISC  11/30/20   Historical Provider, MD   emollient (BIAFINE) cream Apply topically as needed for dry skin  Patient not taking: No sig reported    Historical Provider, MD   escitalopram (LEXAPRO) 10 mg tablet Take 10 mg by mouth daily    Historical Provider, MD   famciclovir (FAMVIR) 500 mg tablet Take 500 mg by mouth 3 (three) times a day  4/12/20  Historical Provider, MD   ferrous sulfate 325 (65 Fe) mg tablet Take 325 mg by mouth daily    Historical Provider, MD   furosemide (LASIX) 40 mg tablet Take 1 tablet (40 mg total) by mouth daily  Patient taking differently: Take 20 mg by mouth daily 12/25/20   Colleen Tubbs MD   gabapentin (NEURONTIN) 300 mg capsule  2/24/22   Historical Provider, MD   gabapentin (NEURONTIN) 600 MG tablet Take 600 mg by mouth 3 (three) times a day  Patient not taking: No sig reported    Historical Provider, MD   glucagon 1 MG injection Inject 1 mg into a muscle once as needed    Historical Provider, MD   guaiFENesin (ROBITUSSIN) 100 MG/5ML oral liquid Take 10 mL by mouth every 4 (four) hours as needed    Patient not taking: No sig reported    Historical Provider, MD   HumaLOG KwikPen 100 units/mL injection pen  3/10/22   Historical Provider, MD   hydrALAZINE (APRESOLINE) 25 mg tablet Take 75 mg by mouth 3 (three) times a day  Patient not taking: No sig reported 11/9/20   Historical Provider, MD   insulin aspart (NovoLOG) 100 units/mL injection Inject 15 Units under the skin 3 (three) times a day before meals    Historical Provider, MD   insulin aspart, w/niacinamide, (FIASP) 100 Units/mL injection pen Inject 8 Units under the skin  Patient not taking: No sig reported    Historical Provider, MD   insulin glargine (LANTUS SOLOSTAR) 100 units/mL injection pen Inject 34 Units under the skin 2 (two) times a day Every morning and at bedtime  Patient not taking: No sig reported    Historical Provider, MD   linaGLIPtin 5 MG TABS Take 5 mg by mouth daily  Patient not taking: No sig reported    Historical Provider, MD   lisinopril (ZESTRIL) 10 mg tablet  7/8/20   Historical Provider, MD   lisinopril (ZESTRIL) 20 mg tablet  9/30/20   Historical Provider, MD   lisinopril (ZESTRIL) 30 mg tablet  10/12/20   Historical Provider, MD   lisinopril (ZESTRIL) 40 mg tablet  10/28/20   Historical Provider, MD   loperamide (IMODIUM) 2 mg capsule  12/17/20   Historical Provider, MD   meclizine (ANTIVERT) 25 mg tablet  7/28/20   Historical Provider, MD   Melatonin 3 MG CAPS Take 3 mg by mouth daily at bedtime    Historical Provider, MD   melatonin 3 mg  7/29/21   Historical Provider, MD   Menthol-Methyl Salicylate ([employer] MUSCLE RUB) 10-15 % CREA Apply 1 application topically 3 (three) times a day Apply to shoulders and neck  Patient not taking: No sig reported    Historical Provider, MD   metFORMIN (GLUCOPHAGE) 1000 MG tablet Take by mouth every 12 (twelve) hours  Patient not taking: No sig reported    Historical Provider, MD   metoprolol tartrate (LOPRESSOR) 50 mg tablet Take 75 mg by mouth every 12 (twelve) hours    Historical Provider, MD   Metoprolol Tartrate 75 MG TABS  2/24/22   Historical Provider, MD   NovoLOG FlexPen 100 units/mL injection pen  11/17/21   Historical Provider, MD   nystatin (MYCOSTATIN) ointment Apply thin film to moist fissure lesions within the pannis 2 times daily until resolved  Patient not taking: No sig reported 4/20/21   LEANNE Spangler   nystatin (MYCOSTATIN) powder Apply topically 2 (two) times a day  Patient not taking: No sig reported 12/24/20   Colleen Tubbs MD   omeprazole (PriLOSEC) 40 MG capsule  7/29/21   Historical Provider, MD   pantoprazole (PROTONIX) 40 mg tablet Take 1 tablet (40 mg total) by mouth daily  Patient not taking: Reported on 8/31/2021 6/7/21 9/5/21  Larry Lundberg MD   polyethylene glycol (GOLYTELY) 4000 mL solution Take 4,000 mL by mouth once for 1 dose Per office instructions 3/5/21 3/5/21  Jw Perez PA-C   polyethylene glycol (MIRALAX) 17 g packet Take 17 g by mouth daily as needed    Historical Provider, MD   polyvinyl alcohol (LIQUIFILM TEARS) 1 4 % ophthalmic solution Administer 1 drop to both eyes as needed      Historical Provider, MD   Senna S 8 6-50 MG per tablet  2/10/21   Historical Provider, MD   silver sulfadiazine (SILVADENE,SSD) 1 % cream  2/18/21   Historical Provider, MD   spironolactone (ALDACTONE) 25 mg tablet 12 5 mg  2/26/21   Historical Provider, MD   tamsulosin (FLOMAX) 0 4 mg Take 2 capsules by mouth daily    Historical Provider, MD     I have reveiwed home medications using records provided by [facility]  Allergies: No Known Allergies    Social History:  Marital Status:     Occupation:   Patient Pre-hospital Living Situation: [facility]: [address] [place] [address]  Patient Pre-hospital Level of Mobility: manual wheelchair  Patient Pre-hospital Diet Restrictions: none  Substance Use History:   Social History     Substance and Sexual Activity   Alcohol Use Not Currently     Social History     Tobacco Use   Smoking Status Former   Smokeless Tobacco Never     Social History     Substance and Sexual Activity   Drug Use Never       Family History:  Family History   Problem Relation Age of Onset   • No Known Problems Mother    • No Known Problems Father        Physical Exam:     Vitals:   Blood Pressure: 129/60 (03/03/23 2347)  Pulse: 73 (03/03/23 2347)  Temperature: 97 9 °F (36 6 °C) (03/03/23 2347)  Temp Source: Oral (03/03/23 2347)  Respirations: 18 (03/03/23 2347)  SpO2: 98 % (03/03/23 2347)    Physical Exam  Constitutional:       General: She is not in acute distress  Appearance: She is obese  She is ill-appearing (chronically)  HENT:      Head: Normocephalic and atraumatic  Ears:      Comments: Hard of hearing  Eyes:      Pupils: Pupils are equal, round, and reactive to light  Cardiovascular:      Rate and Rhythm: Normal rate and regular rhythm  Heart sounds: Murmur heard  No friction rub  No gallop  Pulmonary:      Effort: Pulmonary effort is normal       Breath sounds: Normal breath sounds  No wheezing, rhonchi or rales  Abdominal:      General: Abdomen is flat  There is no distension  Palpations: Abdomen is soft  Tenderness: There is no abdominal tenderness  Musculoskeletal:      Right lower leg: Edema present  Left lower leg: Edema present  Comments: No deformity/swelling to L wrist   Skin:     General: Skin is warm and dry  Neurological:      General: No focal deficit present  Mental Status: She is alert  Mental status is at baseline  Psychiatric:         Mood and Affect: Mood normal          Additional Data:     Lab Results:  Results from last 7 days   Lab Units 03/03/23 2213 03/03/23  1918   WBC Thousand/uL 4 87 4 60   HEMOGLOBIN g/dL 8 0* 8 2*   HEMATOCRIT % 25 4* 26 5*   PLATELETS Thousands/uL 78* 79*   NEUTROS PCT %  --  71   LYMPHS PCT %  --  16   MONOS PCT %  --  9   EOS PCT %  --  2     Results from last 7 days   Lab Units 03/03/23  1918   SODIUM mmol/L 138   POTASSIUM mmol/L 4 6   CHLORIDE mmol/L 103   CO2 mmol/L 25   BUN mg/dL 42*   CREATININE mg/dL 1 81*   ANION GAP mmol/L 10   CALCIUM mg/dL 9 0   ALBUMIN g/dL 3 8   TOTAL BILIRUBIN mg/dL 0 59   ALK PHOS U/L 58   ALT U/L 11   AST U/L 20   GLUCOSE RANDOM mg/dL 152*     Results from last 7 days   Lab Units 03/03/23 2213   INR  1 34*                   Lines/Drains:  Invasive Devices     Peripheral Intravenous Line  Duration           Peripheral IV 03/03/23 Distal;Dorsal (posterior); Left Forearm <1 day                    Imaging: Reviewed radiology reports from this admission including: chest xray  XR chest 1 view portable   ED Interpretation by Wolfgang Clinton PA-C (03/03 2118)   No acute cardiopulmonary disease on initial read; [address] pacemaker          EKG and Other Studies Reviewed on Admission:   · EKG: Paced rhythm  HR 69     ** Please Note: This note has been constructed using a voice recognition system   **

## 2023-03-04 NOTE — PROGRESS NOTES
[address]  Progress Note - Daya Foots 1945, 68 y o  female MRN: [id number]  Unit/Bed#: S -Vadim Encounter: [id number]  Primary Care Provider: Myranda Hackett MD   Date and time admitted to hospital: 3/3/2023  7:01 PM    Hypoxia  Assessment & Plan  Presented to the emergency department with chest pressure, shortness of breath and hypoxia  · CAT scan obtained which showed pulmonary edema  · D-dimer age adjusted as normal, heparin gtt discontinued due to improving hypoxia with diuresis and risk of hemoptysis  · We will check echocardiogram    * Chest pain  Assessment & Plan  · Patient with acute episode of near syncope while showering with left-sided chest pain and shortness of breath  EKG showed left bundle branch block  2-hour delta troponin 3  Maintained on telemetry  Heparin drip initiated  Continue on aspirin and statin and nitro  · Cardiology consult and support appreciated    Elevated troponin  Assessment & Plan  · Awaiting cardiology input    Nonrheumatic aortic (valve) stenosis  Assessment & Plan  · Severe stenosis per recent echocardiogram follow-ups with cardiology with no surgical intervention recommended due to poor candidacy  Continue medical management and monitoring of volume status    History of CVA (cerebrovascular accident)  Assessment & Plan  · Continue supportive care    Hypertension  Assessment & Plan  · Pressures acceptable on regimen of spironolactone, Lopressor  Currently on IV diuresis    Type 2 diabetes mellitus [facility])  Assessment & Plan  Lab Results   Component Value Date    HGBA1C 5 9 (H) 03/03/2023       Recent Labs     03/04/23  0240 03/04/23  0815   POCGLU 229* 268*       Blood Sugar Average: Last 72 hrs:  · (P) 248  5well controlled per most recent hgb a1c  · Continue home insulin regimen  · Sliding scale initiated    History of cardiac pacemaker  Assessment & Plan  · History of AV block    Iron deficiency anemia  Assessment & Plan  · Hemoglobin 8 at baseline with a history of AVM and GI bleed  Continue to trend        VTE Pharmacologic Prophylaxis: VTE Score: 5 High Risk (Score >/= 5) - Pharmacological DVT Prophylaxis Ordered: heparin  Sequential Compression Devices Ordered  Patient Centered Rounds: I performed bedside rounds with nursing staff today  Discussions with Specialists or Other Care Team Provider: Cardiology    Education and Discussions with Family / Patient: Patient declined call to   Total Time Spent on Date of Encounter in care of patient: 35 minutes This time was spent on one or more of the following: performing physical exam; counseling and coordination of care; obtaining or reviewing history; documenting in the medical record; reviewing/ordering tests, medications or procedures; communicating with other healthcare professionals and discussing with patient's family/caregivers  Current Length of Stay: 0 day(s)  Current Patient Status: Observation   Certification Statement: The patient will continue to require additional inpatient hospital stay due to hypoxia  Discharge Plan: Anticipate discharge in 48-72 hrs to discharge location to be determined pending rehab evaluations  Code Status: Level 3 - DNAR and DNI    Subjective:   She feels her shortness of breath has improved  She denies any chest pain  She denies abdominal pain  He had 1 episode of emesis this morning  Overnight notes she had hemoptysis  Objective:     Vitals:   Temp (24hrs), Av 1 °F (36 7 °C), Min:97 9 °F (36 6 °C), Max:98 4 °F (36 9 °C)    Temp:  [97 9 °F (36 6 °C)-98 4 °F (36 9 °C)] 98 4 °F (36 9 °C)  HR:  [60-90] 75  Resp:  [18-20] 19  BP: (110-153)/(57-67) 139/67  SpO2:  [90 %-99 %] 92 %  There is no height or weight on file to calculate BMI       Input and Output Summary (last 24 hours):   No intake or output data in the 24 hours ending 23 1023    Physical Exam:   Physical Exam  Vitals and nursing note reviewed  Constitutional:       Appearance: Normal appearance  She is not ill-appearing or diaphoretic  HENT:      Head: Normocephalic  Nose: Nose normal       Comments: Nasal cannula 2 L     Mouth/Throat:      Mouth: Mucous membranes are moist       Pharynx: No oropharyngeal exudate  Eyes:      General: No scleral icterus  Conjunctiva/sclera: Conjunctivae normal    Pulmonary:      Effort: Pulmonary effort is normal  No respiratory distress  Abdominal:      General: Bowel sounds are normal  There is no distension  Palpations: Abdomen is soft  Tenderness: There is no abdominal tenderness  Genitourinary:     Comments: purewick in place    Musculoskeletal:      Right lower leg: Edema present  Left lower leg: No edema  Skin:     General: Skin is warm  Coloration: Skin is not jaundiced  Neurological:      Mental Status: She is alert     Psychiatric:         Mood and Affect: Mood normal          Behavior: Behavior normal           Additional Data:     Labs:  Results from last 7 days   Lab Units 03/03/23 2213 03/03/23 1918   WBC Thousand/uL 4 87 4 60   HEMOGLOBIN g/dL 8 0* 8 2*   HEMATOCRIT % 25 4* 26 5*   PLATELETS Thousands/uL 78* 79*   NEUTROS PCT %  --  71   LYMPHS PCT %  --  16   MONOS PCT %  --  9   EOS PCT %  --  2     Results from last 7 days   Lab Units 03/03/23 1918   SODIUM mmol/L 138   POTASSIUM mmol/L 4 6   CHLORIDE mmol/L 103   CO2 mmol/L 25   BUN mg/dL 42*   CREATININE mg/dL 1 81*   ANION GAP mmol/L 10   CALCIUM mg/dL 9 0   ALBUMIN g/dL 3 8   TOTAL BILIRUBIN mg/dL 0 59   ALK PHOS U/L 58   ALT U/L 11   AST U/L 20   GLUCOSE RANDOM mg/dL 152*     Results from last 7 days   Lab Units 03/03/23 2213   INR  1 34*     Results from last 7 days   Lab Units 03/04/23  0815 03/04/23  0240   POC GLUCOSE mg/dl 268* 229*     Results from last 7 days   Lab Units 03/03/23  1918   HEMOGLOBIN A1C % 5 9*     Results from last 7 days   Lab Units 03/04/23  0556   PROCALCITONIN ng/ml 0 15       Lines/Drains:  Invasive Devices     Peripheral Intravenous Line  Duration           Peripheral IV 03/04/23 Left;Ventral (anterior) Forearm <1 day                  Telemetry:  Telemetry Orders (From admission, onward)             48 Hour Telemetry Monitoring  Continuous x 48 hours        References:    Telemetry Guidelines   Question:  Reason for 48 Hour Telemetry  Answer:  Acute MI, chest pain - R/O MI, or unstable angina                          Imaging: Personally reviewed the following imaging: chest CT scan    Recent Cultures (last 7 days):         Last 24 Hours Medication List:   Current Facility-Administered Medications   Medication Dose Route Frequency Provider Last Rate   • acetaminophen  650 mg Oral Q4H PRN Bentia Lopez PA-C     • ascorbic acid  500 mg Oral Daily Kiara Lopez PA-C     • aspirin  81 mg Oral Daily Kiara Lopez PA-C     • cholecalciferol  1,000 Units Oral Daily Kiara Lopez PA-C     • escitalopram  10 mg Oral Daily Kiara Lopez PA-C     • ferrous sulfate  325 mg Oral Daily Kiara Lopez PA-C     • furosemide  40 mg Intravenous BID (diuretic) Benita Lopez PA-C     • gabapentin  300 mg Oral BID Kiara Lopez PA-C     • glycerin-hypromellose-  1 drop Both Eyes Q3H PRN Benita Lopez PA-C     • insulin glargine  38 Units Subcutaneous Q12H [address] Kiara Lopez PA-C     • insulin lispro  52 Units Subcutaneous TID With Meals Benita Lopez PA-C     • melatonin  3 mg Oral HS Kiara Lopez PA-C     • metoprolol tartrate  75 mg Oral Q12H [address] [facility] [address] Jessica PA-C     • nitroglycerin  0 4 mg Sublingual Q5 Min PRN Kiara Lopez PA-C     • ondansetron  4 mg Intravenous Q6H PRN Benita Lopez PA-C     • pantoprazole  40 mg Oral Early Morning Kiara Lopez PA-C     • polyethylene glycol  17 g Oral Daily PRN Benita Lopez PA-C • spironolactone  25 mg Oral Daily Kiara Lopez PA-C     • tamsulosin  0 8 mg Oral Daily Kiara Lopez PA-C          Today, Patient Was Seen By: Earline Perez MD    **Please Note: This note may have been constructed using a voice recognition system  **

## 2023-03-04 NOTE — ASSESSMENT & PLAN NOTE
· Initial troponin 62; 2hr 65  · Elevated, but trending flat  · NSTEMI vs  Non-MI elevated troponin  · See primary problem for plan of treatment

## 2023-03-04 NOTE — ASSESSMENT & PLAN NOTE
Presented to the emergency department with chest pressure, shortness of breath and hypoxia  · CAT scan obtained which showed pulmonary edema  · D-dimer age adjusted as normal, heparin gtt discontinued due to improving hypoxia with diuresis and risk of hemoptysis  · We will check echocardiogram

## 2023-03-04 NOTE — ASSESSMENT & PLAN NOTE
· Patient with acute episode of near syncope while showering with left-sided chest pain and shortness of breath  EKG showed left bundle branch block  2-hour delta troponin 3  Maintained on telemetry  Heparin drip initiated  Continue on aspirin and statin and nitro     · Cardiology consult and support appreciated

## 2023-03-04 NOTE — CONSULTS
Consultation - Cardiology Team One  Robert Galeana 68 y o  female MRN: [id number]  Unit/Bed#: S -01 Encounter: [id number]    Consults    Physician Requesting Consult: Lester Borges MD  Reason for Consult / Principal Problem: Chest pain/shortness of breath      Assessment/ Plan:    1  Chest pain/elevated troponin: Not ACS  patient had one episode of very mild left-sided dull chest pressure yesterday while she was dizzy and lightheaded; none since  EKG showing V paced  Troponins very mildly elevated and flat  Her troponin elevation is likely a nonischemic myocardial injury secondary to acute heart failure in the setting of known severe aortic stenosis  A limited echo has been ordered to eval LV function and wall motion and will be reviewed when available  2  Acute heart failure: Patient appears to be volume overloaded with some lower extremity edema and rales on exam   CT of the chest showed diffuse pulmonary opacities favored to represent pulmonary edema; discussed with primary team and they do not feel she has pneumonia at this time as she is afebrile and procalcitonin is negative  She did become more hypoxic last night but then improved down to 2 L after getting IV Lasix  She takes Lasix 40 mg p o  daily as an outpatient; agree with orders 40 mg IV twice daily  unfortunately she does not have any I's and O's documented; this needs to be done and she needs daily weights standing scale if able    3  Hypoxia: Was on 5 L nasal cannula now down to 2  Likely in the setting of volume overload from acute heart failure  Her D-dimer was elevated mildly and discussed with primary team low suspicion for PE and not pursuing a VQ scan at this time  We will continue to monitor oxygen needs with diuresis  4  Severe aortic stenosis: Known history of  She follows with Dr Beth Mccormick at [facility]    Most recent office note indicated that due to her low functional status and comorbidities surgical intervention was not advised/discussed with patient and decided against   Patient seems unclear about this but either way I would have her follow-up with Dr Raghu Villafuerte as an outpatient to discuss if they would like to pursue any work-up for TAVR  She is likely a poor candidate with limited rehab potential  We will be obtaining a updated echo to look at her aortic valve and gradient/valve area  5   Complete heart block status post PPM: V pacing  6  Anemia: At baseline  7  Diabetes mellitus type 2: Defer management to primary team  8  Hypertension: Acceptable control most recent 139/67  9  History of CVA: Unclear details  10  Hepatic lesions on CT of the chest: I Did Tiger text primary team to ensure they are aware recommending MRI follow-up as this could potentially indicate metastatic disease        History of Present Illness   HPI: Phyllis Gary is a 68y o  year old female who has a history of known severe aortic stenosis, HFp EF, hypertension, dyslipidemia, diabetes mellitus type 2, history of CVA, complete heart block status post PPM, chronic anemia  She follows with cardiologist Dr Raghu Villafuerte at [facility]  Patient resides at [facility] and is mostly wheelchair-bound with limited ambulation  Patient presented to the emergency department after having an episode of dizziness and lightheadedness while sitting in her wheelchair yesterday  She tells me she had been feeling in her normal state of health and yesterday had a fairly abrupt onset of dizziness and lightheadedness in the wheelchair, she did not have any loss of consciousness did not fall out of the wheelchair  She also told staff that she had some dull left-sided chest discomfort  Thus she was brought to the emergency department for further evaluation  On presentation to the emergency department she was hemodynamically stable with a blood pressure 115/57, afebrile, SPO2 97% on room air    EKG showed ventricular pacing  Chest x-ray read as low lung volumes and persistent cardiomegaly without any acute disease  Labs were significant for creatinine of 1 81, K4 6    High-sensitivity serial troponins 62 --> 65 -->67  Hemoglobin 8 2, WBCs 4 6  Procalcitonin negative  D-dimer 0 72    Patient was initially started on IV heparin with concern for ACS, she was loaded with Brilinta 180 mg x 1  Cardiology asked to see for further management of ACS  Last night her oxygen requirements increased to 5 L  She she underwent a CT of the chest that showed diffuse pulmonary opacities favored to represent pulmonary edema  She also had some hemoptysis and her IV heparin was stopped  She was started on IV Lasix 40 mg twice daily  After getting the Lasix her O2 requirement went down to 2 L  At time my exam patient reports feeling okay  She has not had any recurrent chest discomfort since yesterday when she was at [employer]  She tells me this was very mild in nature left-sided dull pain without radiation  This had occurred at the same time as her episode of lightheadedness  She does admit to increased lower extremity edema, initially told me she gained 5 pounds but when asked what her weight has been she said she had not been weighed [employer]  She tells me she gets dizzy and lightheaded whenever she ambulates, not having any exertional chest discomfort typically  No true syncope  Recent echocardiogram 1/2023 at [facility] shows normal LV systolic function with EF of 60 to 65% with severe concentric hypertrophy, normal wall motion, mild aortic regurgitation with severe stenosis, severe mitral annular calcification and trace tricuspid regurgitation  EKG reviewed personally:   3/3/2023  V paced    Telemetry reviewed personally:   Ventricular pacing    Review of Systems   Constitutional: Positive for weight gain  Negative for decreased appetite and fever  Cardiovascular: Positive for dyspnea on exertion and leg swelling   Negative for chest pain, orthopnea and palpitations  Respiratory: Negative for cough and sleep disturbances due to breathing  Gastrointestinal: Negative for abdominal pain  Genitourinary: Negative for dysuria  Neurological: Negative for dizziness and light-headedness  Dizziness and lightheadedness with ambulation   Psychiatric/Behavioral: Negative for altered mental status  All other systems reviewed and are negative       Historical Information   Past Medical History:   Diagnosis Date   • Anemia    • AV block    • Benign neoplasm of pituitary gland (HCC) 10/2/2013   • Chronic kidney disease    • Chronic pain disorder    • Cirrhosis of liver (HCC)    • CVA (cerebral vascular accident) ([facility] [address] )    • Depression    • Diabetes mellitus ([facility] )    • Hearing loss    • Hemiparesis due to old cerebrovascular accident ([facility] )    • Hyperlipidemia    • Neuropathy    • Stenosis of carotid artery    • Stroke [facility])      Past Surgical History:   Procedure Laterality Date   • CARDIAC PACEMAKER PLACEMENT     • IR PARACENTESIS  12/24/2020   • JOINT REPLACEMENT      right knee     Social History     Substance and Sexual Activity   Alcohol Use Not Currently     Social History     Substance and Sexual Activity   Drug Use Never     Social History     Tobacco Use   Smoking Status Former   Smokeless Tobacco Never     Family History:   Family History   Problem Relation Age of Onset   • No Known Problems Mother    • No Known Problems Father      Meds/Allergies   current meds:   Current Facility-Administered Medications   Medication Dose Route Frequency   • acetaminophen (TYLENOL) tablet 650 mg  650 mg Oral Q4H PRN   • ascorbic acid (VITAMIN C) tablet 500 mg  500 mg Oral Daily   • aspirin (ECOTRIN LOW STRENGTH) EC tablet 81 mg  81 mg Oral Daily   • cholecalciferol (VITAMIN D3) tablet 1,000 Units  1,000 Units Oral Daily   • escitalopram (LEXAPRO) tablet 10 mg  10 mg Oral Daily   • ferrous sulfate tablet 325 mg  325 mg Oral Daily   • furosemide (LASIX) injection 40 mg  40 mg Intravenous BID (diuretic)   • gabapentin (NEURONTIN) capsule 300 mg  300 mg Oral BID   • glycerin-hypromellose- (ARTIFICIAL TEARS) ophthalmic solution 1 drop  1 drop Both Eyes Q3H PRN   • insulin glargine (LANTUS) subcutaneous injection 38 Units 0 38 mL  38 Units Subcutaneous Q12H [address]   • insulin lispro (HumaLOG) 100 units/mL subcutaneous injection 52 Units  52 Units Subcutaneous TID With Meals   • melatonin tablet 3 mg  3 mg Oral HS   • metoprolol tartrate (LOPRESSOR) tablet 75 mg  75 mg Oral Q12H [address]   • nitroglycerin (NITROSTAT) SL tablet 0 4 mg  0 4 mg Sublingual Q5 Min PRN   • ondansetron (ZOFRAN) injection 4 mg  4 mg Intravenous Q6H PRN   • pantoprazole (PROTONIX) EC tablet 40 mg  40 mg Oral Early Morning   • polyethylene glycol (MIRALAX) packet 17 g  17 g Oral Daily PRN   • spironolactone (ALDACTONE) tablet 25 mg  25 mg Oral Daily   • tamsulosin (FLOMAX) capsule 0 8 mg  0 8 mg Oral Daily     No Known Allergies    Objective   Vitals: Blood pressure 139/67, pulse 75, temperature 98 4 °F (36 9 °C), temperature source Oral, resp  rate 19, SpO2 92 %  ,     There is no height or weight on file to calculate BMI ,     Systolic (40DMB), JVY:313 , Min:110 , SHX:738     Diastolic (52UGP), ZGK:74, Min:57, Max:67    No intake or output data in the 24 hours ending 03/04/23 1235  Weight (last 2 days)     None        Invasive Devices     Peripheral Intravenous Line  Duration           Peripheral IV 03/04/23 Left;Ventral (anterior) Forearm <1 day              Physical Exam  Constitutional:       Appearance: She is obese  Comments: Patient lying in bed in NAD alert and cooperative  Very hard of hearing and does not have any hearing aids in the hospital   HENT:      Head: Normocephalic  Mouth/Throat:      Mouth: Mucous membranes are moist    Cardiovascular:      Rate and Rhythm: Normal rate and regular rhythm  Heart sounds: Murmur heard      Systolic murmur is present with a grade of    Pulmonary:      Effort: Pulmonary effort is normal       Breath sounds: Rales present  No wheezing  Comments: On 2 L via nasal cannula  Abdominal:      Palpations: Abdomen is soft  Musculoskeletal:      Right lower le+ Pitting Edema present  Left lower le+ Pitting Edema present  Skin:     General: Skin is warm  Coloration: Skin is pale  Neurological:      Mental Status: She is alert  Mental status is at baseline  Psychiatric:         Mood and Affect: Mood normal        LABORATORY RESULTS:      CBC with diff:   Results from last 7 days   Lab Units 23   WBC Thousand/uL 4 87 4 60   HEMOGLOBIN g/dL 8 0* 8 2*   HEMATOCRIT % 25 4* 26 5*   MCV fL 98 99*   PLATELETS Thousands/uL 78* 79*   MCH pg 30 9 30 7   MCHC g/dL 31 5 30 9*   RDW % 16 3* 16 3*   MPV fL 10 5 10 3   NRBC AUTO /100 WBCs  --  0       CMP:  Results from last 7 days   Lab Units 23   POTASSIUM mmol/L 4 6   CHLORIDE mmol/L 103   CO2 mmol/L 25   BUN mg/dL 42*   CREATININE mg/dL 1 81*   CALCIUM mg/dL 9 0   AST U/L 20   ALT U/L 11   ALK PHOS U/L 58   EGFR ml/min/1 73sq m 26       BMP:  Results from last 7 days   Lab Units 23   POTASSIUM mmol/L 4 6   CHLORIDE mmol/L 103   CO2 mmol/L 25   BUN mg/dL 42*   CREATININE mg/dL 1 81*   CALCIUM mg/dL 9 0          No results found for: NTBNP                Results from last 7 days   Lab Units 23   HEMOGLOBIN A1C % 5 9*              Results from last 7 days   Lab Units 23   INR  1 34*     Lipid Profile:   No results found for: CHOL  Lab Results   Component Value Date    HDL 24 (L) 2023     Lab Results   Component Value Date    LDLCALC 32 2023     Lab Results   Component Value Date    TRIG 257 (H) 2023   w  Imaging: I have personally reviewed pertinent reports  XR chest 1 view portable    Result Date: 3/4/2023  Narrative: CHEST INDICATION:   chest pain   COMPARISON:  2020 EXAM PERFORMED/VIEWS:  XR CHEST PORTABLE Single view FINDINGS: Low lung volumes accentuate markings Cardiomediastinal silhouette remains enlarged  Dual lead left-sided cardiac pacer is again evident No localized pulmonary masses or infiltrates  No pneumothorax or pleural effusion  Osseous structures appear within normal limits for patient age  Impression: Low lung volumes Persistent cardiomegaly No acute cardiopulmonary disease  Workstation performed: [id number]     CT chest wo contrast    Result Date: 3/4/2023  Narrative: CT CHEST WITHOUT IV CONTRAST INDICATION:   Hemoptysis hypoxia, new hemoptysis  COMPARISON:  X-ray yesterday, CT 11/29/19 TECHNIQUE: CT examination of the chest was performed without intravenous contrast  Axial, sagittal, and coronal 2D reformatted images were created from the source data and submitted for interpretation  Radiation dose length product (DLP) for this visit:  384 mGy-cm   This examination, like all CT scans performed in the [facility], was performed utilizing techniques to minimize radiation dose exposure, including the use of iterative reconstruction and automated exposure control  FINDINGS: LUNGS:  There are diffuse, somewhat centrally located groundglass opacities with areas of coalescent airspace infiltrate  There are scattered areas of interstitial septal thickening  PLEURA:  Unremarkable  HEART/GREAT VESSELS: Cardiomegaly Dense sludge about calcification Left pacer   No thoracic aortic aneurysm  MEDIASTINUM AND AIMEE:  1 2 cm right paratracheal lymph node CHEST WALL AND LOWER NECK:  Unremarkable  VISUALIZED STRUCTURES IN THE UPPER ABDOMEN:  3 1 cm right hepatic indeterminate lesion series 301/110  Additional indeterminate 2 6 cm lesion in the right lobe series 301/97 Splenomegaly OSSEOUS STRUCTURES:  No acute fracture or destructive osseous lesion  Impression: 1  Diffuse pulmonary opacities favored to represent pulmonary edema    Multifocal pneumonia could have a similar appearance  2   Mediastinal lymphadenopathy 3  Indeterminate hepatic lesions, may represent metastasis, recommend nonemergent multiphase CT or MRI 4  Splenomegaly The study was marked in EPIC for significant notification  Workstation performed: [id number]     Assessment  Principal Problem:    Chest pain  Active Problems:    Iron deficiency anemia    History of cardiac pacemaker    Type 2 diabetes mellitus ([facility] [address] )    Hypertension    History of CVA (cerebrovascular accident)    Nonrheumatic aortic (valve) stenosis    Elevated troponin    Hypoxia    Thank you for allowing us to participate in this patient's care  This pt will follow up with Dr Mechelle Cardozo at [facility] once discharged  Counseling / Coordination of Care  Total floor / unit time spent today 45 minutes  Greater than 50% of total time was spent with the patient and / or family counseling and / or coordination of care  A description of the counseling / coordination of care: Review of history, current assessment, development of a plan  Code Status: Level 3 - DNAR and DNI    ** Please Note: Dragon 360 Dictation voice to text software may have been used in the creation of this document   **

## 2023-03-04 NOTE — UTILIZATION REVIEW
Initial Clinical Review    Admission: Date/Time/Statement: 3/3/23 2302 observation AND CHANGED 3/4/23 1034 INPATIENT RE: PATIENT NEEDS > 2 MIDNIGHT STAY DUE TO HYPOXIA  Admission Orders (From admission, onward)     Ordered        03/04/23 1034  Inpatient Admission  Once                      Orders Placed This Encounter   Procedures   • Inpatient Admission     Standing Status:   Standing     Number of Occurrences:   1     Order Specific Question:   Level of Care     Answer:   Med Surg [16]     Order Specific Question:   Estimated length of stay     Answer:   More than 2 Midnights     Order Specific Question:   Certification     Answer:   I certify that inpatient services are medically necessary for this patient for a duration of greater than two midnights  See H&P and MD Progress Notes for additional information about the patient's course of treatment  ED Arrival Information     Expected   -    Arrival   3/3/2023 19:01    Acuity   Emergent            Means of arrival   Ambulance    Escorted by   [facility]    Admission type   Emergency            Arrival complaint   EMS           Chief Complaint   Patient presents with   • Chest Pain     Pt coming from [facility] via ems c/o of left sided chest pain that started 45 mins ago after having a near syncope episode in the shower  -LOC -HS  Pt states its a dull pain rating it a 6/10  Ems gave her 2 nitro and two 324 ASA  • Shortness of Breath       Initial Presentation: 68 y o  female from SN[facility] to ED via ems admitted to observation  due to chest pain/elevated troponin AND CONVERTED TO INPATIENT DUE TO HYPOXIA  Presented due to left sided chest pressure starting about 2 hours prior to arrival while in shower with near syncope  Prior to arrival given ntg sl x 2 and 324 mg asa  Exam non focal   CARMEN 5  Heart score 9  D dimer 0 72  Hs tnl 62> 65/Delta3> 67/Delta4  H&H 8 2/26  5  Platelets 79    Bun 42, creatinine 1 81 with baseline of 1 8   Ct chest showed pulmonary edema, mediastinal lymphadenopathy and splenomegaly, indeterminate hepatic lesions  Ecg atrial paced with no ST changes  In the ED given Brilinta, Heparin bolus and gtt  Plan includes: consult Cardiology, telemetry, continued Heparin gtt  Continue home asa, statin and beta blocker  Continue home insulin and add SSI with accuchecks  Date: 3/4/23    Day 2:  CHANGED TO INPATIENT:  Overnight short of breath, sat mid to high 80s on room air  Sputum pink  Placed on oxygen, check ct chest, D Dimer  Post results, heparin gtt dc and started on IV Lasix  This am shortness of breath improving  Episode of emesis  On exam on nasal cannula rajinder 2 liters  pure wick in place  RLE edema    D dimer 0 72  Glucose 268  Continue oxygen, wean as able  Continue diuresis with IV lasix  Check echo  3/4/23 per Cardiology- patient has acute on chronic diastolic CHF/Aotic stenosis/Chest pain and doubt ACS  Plan is check echo but doubt will   Continue Lasix IV  Date: 3/5/23     Day 3: Has surpassed a 2nd midnight with active treatments and services  Chest pain resolved  No hemoptysis since yesterday am    On exam: obese  Systolic murmur  On oxygen 1 5 liters  Bilateral LE edema  H&h 7 4/23  7  Platelets 83  Bun 41, creatinine 1 81  Continue diuresis with IV Lasix  Wean oxygen as able  To discuss actual need for echo as will not        ED Triage Vitals   Temperature Pulse Respirations Blood Pressure SpO2   03/03/23 1915 03/03/23 1904 03/03/23 1904 03/03/23 1909 03/03/23 1909   97 9 °F (36 6 °C) 63 18 115/57 97 %      Temp Source Heart Rate Source Patient Position - Orthostatic VS BP Location FiO2 (%)   03/03/23 1915 03/03/23 1904 03/03/23 1904 03/03/23 1904 --   Oral Monitor Sitting Right arm       Pain Score       03/03/23 1909       6          Wt Readings from Last 1 Encounters:   11/08/22 97 1 kg (214 lb)     Additional Vital Signs:   03/05/23 07:32:05 98 7 °F (37 1 °C) 79 -- 105/50 68 93 % -- 1 5 liters -- --   03/04/23 21:57:51 98 2 °F (36 8 °C) 72 -- 106/47 Abnormal  67 93 % -- -- -- --   03/04/23 15:12:34 98 6 °F (37 °C) 74 -- 127/54 78 91 %         03/04/23 04:23:29 -- 73 -- 131/59 83 92 % -- -- -- --   03/04/23 02:40:23 -- 90 -- 110/64 79 90 % -- -- -- --   03/04/23 0238 98 1 °F (36 7 °C) 89 18 110/64 -- 93 % 40 5 L/min Nasal cannula Lying   03/03/23 2347 97 9 °F (36 6 °C) 73 18 129/60 83 98 % -- -- None (Room air) Lying   03/03/23 2200 -- 65 20 141/61 91 98 % -- -- None (Room air) Sitting   03/03/23 2000 -- 62 20 137/65 93 98 % -- -- None (Room air) Sitting   03/03/23 1915 97 9 °F (36 6 °C) -- -- -- -- -- -- -- -- --     Pertinent Labs/Diagnostic Test Results:   CT chest wo contrast   Final Result by Madilyn Cockayne, MD (03/04 2347)      1  Diffuse pulmonary opacities favored to represent pulmonary edema  Multifocal pneumonia could have a similar appearance  2   Mediastinal lymphadenopathy   3  Indeterminate hepatic lesions, may represent metastasis, recommend nonemergent multiphase CT or MRI   4  Splenomegaly      The study was marked in EPIC for significant notification  Workstation performed: [id number]         XR chest 1 view portable   ED Interpretation by Sonja Sultana PA-C (03/03 2118)   No acute cardiopulmonary disease on initial read; [facility] pacemaker      Final Result by Ana Dickinson MD (11/76 0996)   Low lung volumes   Persistent cardiomegaly   No acute cardiopulmonary disease  Workstation performed: [id number]           3/3/23 1908 ecg Previous ECG:  Compared to current     Comparison ECG info:  When compared with ECG on UA 7/2001, atrial paced   rhythm is now present      Similarity:  Changes noted     Comparison to cardiac monitor:  Yes     Interpretation:     Interpretation: non-specific     Rate:     ECG rate:  62     ECG rate assessment: normal     Rhythm:   Rhythm: sinus rhythm     Ectopy:     Ectopy: none     QRS:     QRS axis:  Left     QRS intervals:  Normal   Conduction:     Conduction: normal     ST segments:     ST segments:  Normal   T waves:     T waves: normal     Comments:      Atrial paced rhythm    3/3/23 2228 ecg Previous ECG:  Compared to current     Comparison ECG info:  Compared with ECG March 3, 2023, 1908     Similarity:  No change     Comparison to cardiac monitor:  Yes     Interpretation:     Interpretation: non-specific     Rate:     ECG rate:  69     ECG rate assessment: normal     Rhythm:     Rhythm: sinus rhythm     Ectopy:     Ectopy: none     QRS:     QRS axis:  Left     QRS intervals:  Normal   Conduction:     Conduction: normal     ST segments:     ST segments:  Normal      Results from last 7 days   Lab Units 03/05/23 0459 03/03/23 2213 03/03/23 1918   WBC Thousand/uL 4 84 4 87 4 60   HEMOGLOBIN g/dL 7 4* 8 0* 8 2*   HEMATOCRIT % 23 7* 25 4* 26 5*   PLATELETS Thousands/uL 83* 78* 79*   NEUTROS ABS Thousands/µL 3 41  --  3 29     Results from last 7 days   Lab Units 03/05/23 0459 03/03/23 1918   SODIUM mmol/L 138 138   POTASSIUM mmol/L 4 4 4 6   CHLORIDE mmol/L 102 103   CO2 mmol/L 26 25   ANION GAP mmol/L 10 10   BUN mg/dL 41* 42*   CREATININE mg/dL 1 81* 1 81*   EGFR ml/min/1 73sq m 26 26   CALCIUM mg/dL 8 9 9 0     Results from last 7 days   Lab Units 03/03/23 1918   AST U/L 20   ALT U/L 11   ALK PHOS U/L 58   TOTAL PROTEIN g/dL 7 1   ALBUMIN g/dL 3 8   TOTAL BILIRUBIN mg/dL 0 59     Results from last 7 days   Lab Units 03/05/23  0729 03/04/23  2101 03/04/23  1642 03/04/23  1219 03/04/23  0815 03/04/23  0240   POC GLUCOSE mg/dl 150* 129 133 196* 268* 229*     Results from last 7 days   Lab Units 03/05/23 0459 03/03/23 1918   GLUCOSE RANDOM mg/dL 122 152*     Results from last 7 days   Lab Units 03/03/23  2324 03/03/23  2115 03/03/23 1918   HS TNI 0HR ng/L  --   --  62*   HS TNI 2HR ng/L  --  65*  --    HSTNI D2 ng/L  --  3  -- HS TNI 4HR ng/L 67*  --   --    HSTNI D4 ng/L 5  --   --      Results from last 7 days   Lab Units 03/03/23 2213   D-DIMER QUANTITATIVE ug/ml FEU 0 72*     Results from last 7 days   Lab Units 03/03/23 2213   PROTIME seconds 16 9*   INR  1 34*   PTT seconds 28       ED Treatment:   Medication Administration from 03/03/2023 1900 to 03/03/2023 2340       Date/Time Order Dose Route Action Comments     03/03/2023 2152 EST ticagrelor (BRILINTA) tablet 180 mg 180 mg Oral Given --     03/03/2023 2219 EST heparin (porcine) injection 4,000 Units 4,000 Units Intravenous Given --     03/03/2023 2301 EST heparin (porcine) 25,000 units in 0 45% NaCl 250 mL infusion (premix) 11 1 Units/kg/hr Intravenous Rate/Dose Verify --     03/03/2023 2219 EST heparin (porcine) 25,000 units in 0 45% NaCl 250 mL infusion (premix) 11 1 Units/kg/hr Intravenous New Bag --        Past Medical History:   Diagnosis Date   • Anemia    • AV block    • Benign neoplasm of pituitary gland (HCC) 10/2/2013   • Chronic kidney disease    • Chronic pain disorder    • Cirrhosis of liver (HCC)    • CVA (cerebral vascular accident) ([facility] )    • Depression    • Diabetes mellitus ([facility] )    • Hearing loss    • Hemiparesis due to old cerebrovascular accident ([facility] )    • Hyperlipidemia    • Neuropathy    • Stenosis of carotid artery    • Stroke [facility])      Present on Admission:  • Type 2 diabetes mellitus ([facility] )  • Hypertension  • Iron deficiency anemia  • Nonrheumatic aortic (valve) stenosis      Admitting Diagnosis: Hyperlipidemia [E78 5]  Syncope [R55]  NSTEMI (non-ST elevated myocardial infarction) ([facility] ) [I21 4]  Age/Sex: 68 y o  female  Admission Orders:  Scheduled Medications:  ascorbic acid, 500 mg, Oral, Daily  aspirin, 81 mg, Oral, Daily  cholecalciferol, 1,000 Units, Oral, Daily  escitalopram, 10 mg, Oral, Daily  ferrous sulfate, 325 mg, Oral, Daily  furosemide, 40 mg, Intravenous, BID (diuretic)  gabapentin, 300 mg, Oral, BID  insulin glargine, 38 Units, Subcutaneous, Q12H [address]  insulin lispro, 52 Units, Subcutaneous, TID With Meals  melatonin, 3 mg, Oral, HS  metoprolol tartrate, 75 mg, Oral, Q12H DAV  pantoprazole, 40 mg, Oral, Early Morning  spironolactone, 25 mg, Oral, Daily  tamsulosin, 0 8 mg, Oral, Daily    Continuous IV Infusions:  heparin (porcine) 25,000 units in 0 45% NaCl 250 mL infusion (premix)  Rate: 2 7-18 mL/hr Dose: 3-20 Units/kg/hr  Weight Dosing Info: 90 kg (Order-Specific)  Freq: Titrated Route: IV  Last Dose: Stopped (03/04/23 0427)  Start: 03/03/23 2215 End: 03/04/23 0342     PRN Meds: not used   acetaminophen, 650 mg, Oral, Q4H PRN  glycerin-hypromellose-, 1 drop, Both Eyes, Q3H PRN  nitroglycerin, 0 4 mg, Sublingual, Q5 Min PRN  ondansetron, 4 mg, Intravenous, Q6H PRN  polyethylene glycol, 17 g, Oral, Daily PRN    Telemetry   Oxygen 2 liters  IP CONSULT TO CARDIOLOGY    Network Utilization Review Department  ATTENTION: Please call with any questions or concerns to [phone] and carefully listen to the prompts so that you are directed to the right person  All voicemails are confidential   Mayra Payor all requests for admission clinical reviews, approved or denied determinations and any other requests to dedicated fax number below belonging to the campus where the patient is receiving treatment   List of dedicated fax numbers for the Facilities:  [address] DENIALS (Administrative/Medical Necessity) [phone]   [address] (Maternity/NICU/Pediatrics) [phone]   [address] Ay[address]ough [phone]   [facility] [phone]   [facility] [phone]   [facility] [address] [place] [id number] [place] [address] [address] [phone]   [facility] [address] [facility] [place] [address] [phone]

## 2023-03-04 NOTE — ASSESSMENT & PLAN NOTE
· Severe per most recent Echo  · Follows w/ cardiology-- no surgical intervention, poor candidate  · Medical management

## 2023-03-04 NOTE — ED PROVIDER NOTES
History  Chief Complaint   Patient presents with   • Chest Pain     Pt coming from [facility] via ems c/o of left sided chest pain that started 45 mins ago after having a near syncope episode in the shower  -LOC -HS  Pt states its a dull pain rating it a 6/10  Ems gave her 2 nitro and two 324 ASA  • Shortness of Breath     Patient is a 80-year-old female with a history of hypertension, CKD, CVA, diabetes mellitus, hyperlipidemia, and cardiac pacemaker that presents emerged department with improved dull aching left-sided pressure pain for 2 hours  Patient denies associated symptomatology  Patient is from a nursing home and reports that while she was taking a shower she had a near syncopal event with no collapse  Patient stated that she sat down in the shower and noted that she had left-sided chest pain symptoms  Patient was brought to the emergency department for further evaluation of left-sided chest pain symptoms and was given 2's of nitroglycerin and 325 mg of ASA prior to arrival   Patient ports no pain at this time  Patient Martha Kerrie with her daughter this evening that provides part of patient history  Patient denies fevers, chills, vomiting, diarrhea, constipation, urinary symptoms  Patient reports improved nausea symptoms  Patient denies sick contacts recent travel  Patient has recent fall recent trauma  Patient denies shortness of breath, and abdominal pain        History provided by:  Patient   used: No    Chest Pain  Associated symptoms: nausea    Associated symptoms: no abdominal pain, no back pain, no cough, no dizziness, no fever, no headache, no numbness, no palpitations, no shortness of breath, not vomiting and no weakness    Shortness of Breath  Associated symptoms: chest pain    Associated symptoms: no abdominal pain, no cough, no fever, no headaches, no neck pain, no rash, no sore throat and no vomiting        Prior to Admission Medications   Prescriptions Last Dose Informant Patient Reported? Taking?    DropSafe Safety Pen Needles 31G X 6 MM MISC   Yes No   Patient not taking: No sig reported   HumaLOG KwikPen 100 units/mL injection pen   Yes No   Melatonin 3 MG CAPS   Yes No   Sig: Take 3 mg by mouth daily at bedtime   Menthol-Methyl Salicylate ([employer] MUSCLE RUB) 10-15 % CREA   Yes No   Sig: Apply 1 application topically 3 (three) times a day Apply to shoulders and neck   Patient not taking: No sig reported   Metoprolol Tartrate 75 MG TABS   Yes No   NovoLOG FlexPen 100 units/mL injection pen   Yes No   Patient not taking: No sig reported   Senna S 8 6-50 MG per tablet   Yes No   Patient not taking: Reported on 2022   acetaminophen (TYLENOL) 325 mg tablet   Yes No   Sig: Take 650 mg by mouth every 4 (four) hours as needed   albuterol (2 5 mg/3 mL) 0 083 % nebulizer solution   Yes No   Sig: Take 2 5 mg by nebulization every 4 (four) hours as needed   Patient not taking: No sig reported   ascorbic acid (VITAMIN C) 500 MG tablet   Yes No   Sig: Take 500 mg by mouth daily   aspirin (ECOTRIN LOW STRENGTH) 81 mg EC tablet   Yes No   Sig: Take 81 mg by mouth daily   bisacodyl (DULCOLAX) 5 mg EC tablet   No No   Sig: Take 2 tablets (10 mg total) by mouth once for 1 dose Per office instructions   cholecalciferol (VITAMIN D3) 1,000 units tablet   Yes No   dextran 70-hypromellose (Artificial Tears) 0 1-0 3 % ophthalmic solution   Yes No   Si drop every 3 (three) hours as needed     diphenhydrAMINE (BENADRYL) 12 5 mg/5 mL oral liquid   Yes No   Sig: Take by mouth 4 (four) times a day as needed for allergies   Patient not taking: No sig reported   docusate sodium (COLACE) 100 mg capsule   Yes No   Sig: Take 100 mg by mouth daily   Patient not taking: No sig reported   emollient (BIAFINE) cream   Yes No   Sig: Apply topically as needed for dry skin   Patient not taking: No sig reported   escitalopram (LEXAPRO) 10 mg tablet   Yes No   Sig: Take 10 mg by mouth daily   famciclovir [facility]) 500 mg tablet   Yes No   Sig: Take 500 mg by mouth 3 (three) times a day   ferrous sulfate 325 (65 Fe) mg tablet   Yes No   Sig: Take 325 mg by mouth daily   furosemide (LASIX) 40 mg tablet   No No   Sig: Take 1 tablet (40 mg total) by mouth daily   Patient taking differently: Take 20 mg by mouth daily   gabapentin (NEURONTIN) 300 mg capsule   Yes No   gabapentin (NEURONTIN) 600 MG tablet   Yes No   Sig: Take 600 mg by mouth 3 (three) times a day   Patient not taking: No sig reported   glucagon 1 MG injection   Yes No   Sig: Inject 1 mg into a muscle once as needed   guaiFENesin (ROBITUSSIN) 100 MG/5ML oral liquid   Yes No   Sig: Take 10 mL by mouth every 4 (four) hours as needed     Patient not taking: No sig reported   hydrALAZINE (APRESOLINE) 25 mg tablet   Yes No   Sig: Take 75 mg by mouth 3 (three) times a day   Patient not taking: No sig reported   insulin aspart (NovoLOG) 100 units/mL injection   Yes No   Sig: Inject 15 Units under the skin 3 (three) times a day before meals   insulin aspart, w/niacinamide, (FIASP) 100 Units/mL injection pen   Yes No   Sig: Inject 8 Units under the skin   Patient not taking: No sig reported   insulin glargine (LANTUS SOLOSTAR) 100 units/mL injection pen   Yes No   Sig: Inject 34 Units under the skin 2 (two) times a day Every morning and at bedtime   Patient not taking: No sig reported   linaGLIPtin 5 MG TABS   Yes No   Sig: Take 5 mg by mouth daily   Patient not taking: No sig reported   lisinopril (ZESTRIL) 10 mg tablet   Yes No   Patient not taking: No sig reported   lisinopril (ZESTRIL) 20 mg tablet   Yes No   Patient not taking: No sig reported   lisinopril (ZESTRIL) 30 mg tablet   Yes No   Patient not taking: No sig reported   lisinopril (ZESTRIL) 40 mg tablet   Yes No   Patient not taking: No sig reported   loperamide (IMODIUM) 2 mg capsule   Yes No   Patient not taking: Reported on 8/31/2021   meclizine (ANTIVERT) 25 mg tablet   Yes No   Patient not taking: No sig reported   melatonin 3 mg   Yes No   Patient not taking: No sig reported   metFORMIN (GLUCOPHAGE) 1000 MG tablet   Yes No   Sig: Take by mouth every 12 (twelve) hours   Patient not taking: No sig reported   metoprolol tartrate (LOPRESSOR) 50 mg tablet   Yes No   Sig: Take 75 mg by mouth every 12 (twelve) hours   nystatin (MYCOSTATIN) ointment   No No   Sig: Apply thin film to moist fissure lesions within the pannis 2 times daily until resolved   Patient not taking: No sig reported   nystatin (MYCOSTATIN) powder   No No   Sig: Apply topically 2 (two) times a day   Patient not taking: No sig reported   omeprazole (PriLOSEC) 40 MG capsule   Yes No   pantoprazole (PROTONIX) 40 mg tablet   No No   Sig: Take 1 tablet (40 mg total) by mouth daily   Patient not taking: Reported on 2021   polyethylene glycol (GOLYTELY) 4000 mL solution   No No   Sig: Take 4,000 mL by mouth once for 1 dose Per office instructions   polyethylene glycol (MIRALAX) 17 g packet   Yes No   Sig: Take 17 g by mouth daily as needed   polyvinyl alcohol (LIQUIFILM TEARS) 1 4 % ophthalmic solution   Yes No   Sig: Administer 1 drop to both eyes as needed     silver sulfadiazine (SILVADENE,SSD) 1 % cream   Yes No   Patient not taking: No sig reported   spironolactone (ALDACTONE) 25 mg tablet   Yes No   Si 5 mg    tamsulosin (FLOMAX) 0 4 mg   Yes No   Sig: Take 2 capsules by mouth daily      Facility-Administered Medications: None       Past Medical History:   Diagnosis Date   • Anemia    • AV block    • Benign neoplasm of pituitary gland (HCC) 10/2/2013   • Chronic kidney disease    • Chronic pain disorder    • Cirrhosis of liver (HCC)    • CVA (cerebral vascular accident) ([facility] [address] )    • Depression    • Diabetes mellitus ([facility] [address] )    • Hearing loss    • Hemiparesis due to old cerebrovascular accident [facility])    • Hyperlipidemia    • Neuropathy    • Stenosis of carotid artery    • Stroke [facility])        Past Surgical History:   Procedure Laterality Date • CARDIAC PACEMAKER PLACEMENT     • IR PARACENTESIS  12/24/2020   • JOINT REPLACEMENT      right knee       Family History   Problem Relation Age of Onset   • No Known Problems Mother    • No Known Problems Father      I have reviewed and agree with the history as documented  E-Cigarette/Vaping   • E-Cigarette Use Never User      E-Cigarette/Vaping Substances   • Nicotine No    • THC No    • CBD No    • Flavoring No    • Other No    • Unknown No      Social History     Tobacco Use   • Smoking status: Former   • Smokeless tobacco: Never   Vaping Use   • Vaping Use: Never used   Substance Use Topics   • Alcohol use: Not Currently   • Drug use: Never       Review of Systems   Constitutional: Negative for activity change, appetite change, chills and fever  HENT: Negative for congestion, postnasal drip, rhinorrhea, sinus pressure, sinus pain, sore throat and tinnitus  Eyes: Negative for photophobia and visual disturbance  Respiratory: Negative for cough, chest tightness and shortness of breath  Cardiovascular: Positive for chest pain  Negative for palpitations  Gastrointestinal: Positive for nausea  Negative for abdominal pain, constipation, diarrhea and vomiting  Genitourinary: Negative for difficulty urinating, dysuria, flank pain, frequency and urgency  Musculoskeletal: Negative for back pain, gait problem, neck pain and neck stiffness  Skin: Negative for pallor and rash  Allergic/Immunologic: Negative for environmental allergies and food allergies  Neurological: Negative for dizziness, weakness, numbness and headaches  Psychiatric/Behavioral: Negative for confusion  All other systems reviewed and are negative  Physical Exam  Physical Exam  Vitals and nursing note reviewed  Constitutional:       General: She is awake  Appearance: Normal appearance  She is well-developed  She is not ill-appearing, toxic-appearing or diaphoretic        Comments: ,/66 (BP Location: Right arm)   Pulse 64   Temp 97 9 °F (36 6 °C) (Oral)   Resp 20   SpO2 98%      HENT:      Head: Normocephalic and atraumatic  Right Ear: Hearing and external ear normal  No decreased hearing noted  No drainage, swelling or tenderness  No mastoid tenderness  Left Ear: Hearing and external ear normal  No decreased hearing noted  No drainage, swelling or tenderness  No mastoid tenderness  Nose: Nose normal       Mouth/Throat:      Lips: Pink  Mouth: Mucous membranes are moist       Pharynx: Oropharynx is clear  Uvula midline  Eyes:      General: Lids are normal  Vision grossly intact  Right eye: No discharge  Left eye: No discharge  Extraocular Movements: Extraocular movements intact  Conjunctiva/sclera: Conjunctivae normal       Pupils: Pupils are equal, round, and reactive to light  Neck:      Vascular: No JVD  Trachea: Trachea and phonation normal  No tracheal tenderness or tracheal deviation  Cardiovascular:      Rate and Rhythm: Normal rate and regular rhythm  Pulses: Normal pulses  Radial pulses are 2+ on the right side and 2+ on the left side  Posterior tibial pulses are 2+ on the right side and 2+ on the left side  Heart sounds: Normal heart sounds  Pulmonary:      Effort: Pulmonary effort is normal       Breath sounds: Normal breath sounds  No stridor  No decreased breath sounds, wheezing, rhonchi or rales  Chest:      Chest wall: No tenderness  Abdominal:      General: Abdomen is flat  Bowel sounds are normal  There is no distension  Palpations: Abdomen is soft  Abdomen is not rigid  Tenderness: There is no abdominal tenderness  There is no guarding or rebound  Musculoskeletal:         General: Normal range of motion  Cervical back: Full passive range of motion without pain, normal range of motion and neck supple  No rigidity  No spinous process tenderness or muscular tenderness  Normal range of motion  Lymphadenopathy:      Head:      Right side of head: No submental, submandibular, tonsillar, preauricular, posterior auricular or occipital adenopathy  Left side of head: No submental, submandibular, tonsillar, preauricular, posterior auricular or occipital adenopathy  Cervical: No cervical adenopathy  Right cervical: No superficial, deep or posterior cervical adenopathy  Left cervical: No superficial, deep or posterior cervical adenopathy  Skin:     General: Skin is warm  Capillary Refill: Capillary refill takes less than 2 seconds  Neurological:      General: No focal deficit present  Mental Status: She is alert and oriented to person, place, and time  GCS: GCS eye subscore is 4  GCS verbal subscore is 5  GCS motor subscore is 6  Sensory: No sensory deficit  Deep Tendon Reflexes: Reflexes are normal and symmetric  Reflex Scores:       Patellar reflexes are 2+ on the right side and 2+ on the left side  Psychiatric:         Mood and Affect: Mood normal          Speech: Speech normal          Behavior: Behavior normal  Behavior is cooperative  Thought Content:  Thought content normal          Judgment: Judgment normal          Vital Signs  ED Triage Vitals   Temperature Pulse Respirations Blood Pressure SpO2   03/03/23 1915 03/03/23 1904 03/03/23 1904 03/03/23 1909 03/03/23 1909   97 9 °F (36 6 °C) 63 18 115/57 97 %      Temp Source Heart Rate Source Patient Position - Orthostatic VS BP Location FiO2 (%)   03/03/23 1915 03/03/23 1904 03/03/23 1904 03/03/23 1904 --   Oral Monitor Sitting Right arm       Pain Score       03/03/23 1909       6           Vitals:    03/03/23 2315 03/03/23 2347 03/04/23 0238 03/04/23 0240   BP: 153/63 129/60 110/64 110/64   Pulse: 68 73 89 90   Patient Position - Orthostatic VS: Lying Lying Lying          Visual Acuity      ED Medications  Medications   heparin (porcine) 25,000 units in 0 45% NaCl 250 mL infusion (premix) (11 1 Units/kg/hr × 90 kg (Order-Specific) Intravenous Rate/Dose Verify 3/3/23 2301)   heparin (porcine) injection 4,000 Units (has no administration in time range)   heparin (porcine) injection 2,000 Units (has no administration in time range)   insulin lispro (HumaLOG) 100 units/mL subcutaneous injection 52 Units (has no administration in time range)   furosemide (LASIX) tablet 40 mg (has no administration in time range)   acetaminophen (TYLENOL) tablet 650 mg (has no administration in time range)   aspirin (ECOTRIN LOW STRENGTH) EC tablet 81 mg (has no administration in time range)   ascorbic acid (VITAMIN C) tablet 500 mg (has no administration in time range)   cholecalciferol (VITAMIN D3) tablet 1,000 Units (has no administration in time range)   escitalopram (LEXAPRO) tablet 10 mg (has no administration in time range)   ferrous sulfate tablet 325 mg (has no administration in time range)   melatonin tablet 3 mg (3 mg Oral Given 3/4/23 0102)   metoprolol tartrate (LOPRESSOR) tablet 75 mg (75 mg Oral Given 3/4/23 0102)   pantoprazole (PROTONIX) EC tablet 40 mg (has no administration in time range)   polyethylene glycol (MIRALAX) packet 17 g (has no administration in time range)   spironolactone (ALDACTONE) tablet 25 mg (has no administration in time range)   tamsulosin (FLOMAX) capsule 0 8 mg (has no administration in time range)   ondansetron (ZOFRAN) injection 4 mg (has no administration in time range)   nitroglycerin (NITROSTAT) SL tablet 0 4 mg (has no administration in time range)   insulin glargine (LANTUS) subcutaneous injection 38 Units 0 38 mL (38 Units Subcutaneous Given 3/4/23 0102)   gabapentin (NEURONTIN) capsule 300 mg (has no administration in time range)   glycerin-hypromellose- (ARTIFICIAL TEARS) ophthalmic solution 1 drop (has no administration in time range)   ticagrelor (BRILINTA) tablet 180 mg (180 mg Oral Given 3/3/23 2152)   heparin (porcine) injection 4,000 Units (4,000 Units Intravenous Given 3/3/23 2219)       Diagnostic Studies  Results Reviewed     Procedure Component Value Units Date/Time    D-dimer, quantitative [[id number]]  (Abnormal) Collected: 03/03/23 2213    Lab Status: Final result Specimen: Blood from Arm, Left Updated: 03/04/23 0310     D-Dimer, Quant 0 72 ug/ml FEU     Narrative: In the evaluation for possible pulmonary embolism, in the appropriate (Well's Score of 4 or less) patient, the age adjusted d-dimer cutoff for this patient can be calculated as:    Age x 0 01 (in ug/mL) for Age-adjusted D-dimer exclusion threshold for a patient over 50 years  HS Troponin I 4hr [[id number]]  (Abnormal) Collected: 03/03/23 2324    Lab Status: Final result Specimen: Blood from Arm, Left Updated: 03/03/23 2357     hs TnI 4hr 67 ng/L      Delta 4hr hsTnI 5 ng/L     CBC [[id number]]  (Abnormal) Collected: 03/03/23 2213    Lab Status: Final result Specimen: Blood from Arm, Left Updated: 03/03/23 2251     WBC 4 87 Thousand/uL      RBC 2 59 Million/uL      Hemoglobin 8 0 g/dL      Hematocrit 25 4 %      MCV 98 fL      MCH 30 9 pg      MCHC 31 5 g/dL      RDW 16 3 %      Platelets 78 Thousands/uL      MPV 10 5 fL     APTT six (6) hours after Heparin bolus/drip initiation or dosing change [[id number]]  (Normal) Collected: 03/03/23 2213    Lab Status: Final result Specimen: Blood from Arm, Left Updated: 03/03/23 2237     PTT 28 seconds     Protime-INR [[id number]]  (Abnormal) Collected: 03/03/23 2213    Lab Status: Final result Specimen: Blood from Arm, Left Updated: 03/03/23 2237     Protime 16 9 seconds      INR 1 34    Lipid Panel with Direct LDL reflex [[id number]]  (Abnormal) Collected: 03/03/23 1918    Lab Status: Final result Specimen: Blood from Arm, Left Updated: 03/03/23 2203     Cholesterol 107 mg/dL      Triglycerides 257 mg/dL      HDL, Direct 24 mg/dL      LDL Calculated 32 mg/dL     Hemoglobin A1C [[id number]] Collected: 03/03/23 1918    Lab Status:  In process Specimen: Blood from Arm, Left Updated: 03/03/23 2151    HS Troponin I 2hr [[id number]]  (Abnormal) Collected: 03/03/23 2115    Lab Status: Final result Specimen: Blood from Arm, Left Updated: 03/03/23 2148     hs TnI 2hr 65 ng/L      Delta 2hr hsTnI 3 ng/L     CBC and differential [[id number]]  (Abnormal) Collected: 03/03/23 1918    Lab Status: Final result Specimen: Blood from Arm, Left Updated: 03/03/23 2045     WBC 4 60 Thousand/uL      RBC 2 67 Million/uL      Hemoglobin 8 2 g/dL      Hematocrit 26 5 %      MCV 99 fL      MCH 30 7 pg      MCHC 30 9 g/dL      RDW 16 3 %      MPV 10 3 fL      Platelets 79 Thousands/uL      nRBC 0 /100 WBCs      Neutrophils Relative 71 %      Immat GRANS % 1 %      Lymphocytes Relative 16 %      Monocytes Relative 9 %      Eosinophils Relative 2 %      Basophils Relative 1 %      Neutrophils Absolute 3 29 Thousands/µL      Immature Grans Absolute 0 04 Thousand/uL      Lymphocytes Absolute 0 74 Thousands/µL      Monocytes Absolute 0 41 Thousand/µL      Eosinophils Absolute 0 09 Thousand/µL      Basophils Absolute 0 03 Thousands/µL     HS Troponin 0hr (reflex protocol) [[id number]]  (Abnormal) Collected: 03/03/23 1918    Lab Status: Final result Specimen: Blood from Arm, Left Updated: 03/03/23 1956     hs TnI 0hr 62 ng/L     Comprehensive metabolic panel [[id number]]  (Abnormal) Collected: 03/03/23 1918    Lab Status: Final result Specimen: Blood from Arm, Left Updated: 03/03/23 1949     Sodium 138 mmol/L      Potassium 4 6 mmol/L      Chloride 103 mmol/L      CO2 25 mmol/L      ANION GAP 10 mmol/L      BUN 42 mg/dL      Creatinine 1 81 mg/dL      Glucose 152 mg/dL      Calcium 9 0 mg/dL      AST 20 U/L      ALT 11 U/L      Alkaline Phosphatase 58 U/L      Total Protein 7 1 g/dL      Albumin 3 8 g/dL      Total Bilirubin 0 59 mg/dL      eGFR 26 ml/min/1 73sq m     Narrative:      [place] guidelines for Chronic Kidney Disease (CKD):   •  Stage 1 with normal or high GFR (GFR > 90 mL/min/1 73 square meters)  •  Stage 2 Mild CKD (GFR = 60-89 mL/min/1 73 square meters)  •  Stage 3A Moderate CKD (GFR = 45-59 mL/min/1 73 square meters)  •  Stage 3B Moderate CKD (GFR = 30-44 mL/min/1 73 square meters)  •  Stage 4 Severe CKD (GFR = 15-29 mL/min/1 73 square meters)  •  Stage 5 End Stage CKD (GFR <15 mL/min/1 73 square meters)  Note: GFR calculation is accurate only with a steady state creatinine                 CT chest wo contrast   Final Result by Deborra Mohs, MD (30/63 8232)      1  Diffuse pulmonary opacities favored to represent pulmonary edema  Multifocal pneumonia could have a similar appearance  2   Mediastinal lymphadenopathy   3  Indeterminate hepatic lesions, may represent metastasis, recommend nonemergent multiphase CT or MRI   4  Splenomegaly      The study was marked in EPIC for significant notification  Workstation performed: [id number]         XR chest 1 view portable   ED Interpretation by Crystal Mabry PA-C (03/03 2118)   No acute cardiopulmonary disease on initial read; [address] pacemaker                 Procedures  ECG 12 Lead Documentation Only    Date/Time: 3/3/2023 7:08 PM  Performed by: Crystal Mabry PA-C  Authorized by: Crystal Mabry PA-C     Indications / Diagnosis:  Left sided chest pain  ECG reviewed by me, the ED Provider: yes    Patient location:  ED  Previous ECG:     Previous ECG:  Compared to current    Comparison ECG info: When compared with ECG on  7/2001, atrial paced rhythm is now present      Similarity:  Changes noted    Comparison to cardiac monitor: Yes    Interpretation:     Interpretation: non-specific    Rate:     ECG rate:  62    ECG rate assessment: normal    Rhythm:     Rhythm: sinus rhythm    Ectopy:     Ectopy: none    QRS:     QRS axis:  Left    QRS intervals:  Normal  Conduction:     Conduction: normal    ST segments:     ST segments:  Normal  T waves:     T waves: normal    Comments:      Atrial paced rhythm    ECG 12 Lead Documentation Only    Date/Time: 3/3/2023 10:28 PM  Performed by: Gema Friedman PA-C  Authorized by: Gema Friedman PA-C     Indications / Diagnosis:  Left sided chest pain  ECG reviewed by me, the ED Provider: yes    Patient location:  ED  Previous ECG:     Previous ECG:  Compared to current    Comparison ECG info:  Compared with ECG March 3, 2023, 1908    Similarity:  No change    Comparison to cardiac monitor: Yes    Interpretation:     Interpretation: non-specific    Rate:     ECG rate:  69    ECG rate assessment: normal    Rhythm:     Rhythm: sinus rhythm    Ectopy:     Ectopy: none    QRS:     QRS axis:  Left    QRS intervals:  Normal  Conduction:     Conduction: normal    ST segments:     ST segments:  Normal             ED Course  ED Course as of 03/04/23 0340   Fri Mar 03, 2023   2118 Patient was delivered two nitro and 325mg asa per EMS   2152 hs TnI 2hr(!): 65   2249 Tiger test to slim             HEART Risk Score    Flowsheet Row Most Recent Value   Heart Score Risk Calculator    History 2 Filed at: 03/03/2023 2132   ECG 1 Filed at: 03/03/2023 2132   Age 2 Filed at: 03/03/2023 2132   Risk Factors 2 Filed at: 03/03/2023 2132   Troponin 2 Filed at: 03/03/2023 2132   HEART Score 9 Filed at: 03/03/2023 2132                          CARMEN Risk Score    Flowsheet Row Most Recent Value   Age >= 72 1 Filed at: 03/03/2023 2349   Known CAD (stenosis >= 50%) 0 Filed at: 03/03/2023 2349   Recent (<=24 hrs) Service Angina 1 Filed at: 03/03/2023 2349   ST Deviation >= 0 5 mm 0 Filed at: 03/03/2023 2349   3+ CAD Risk Factors (FHx, HTN, HLP, DM, Smoker) 1 Filed at: 03/03/2023 2349   Aspirin Use Past 7 Days 1 Filed at: 03/03/2023 2349   Elevated Cardiac Markers 1 Filed at: 03/03/2023 2349   CARMEN Risk Score (Calculated) 5 Filed at: 03/03/2023 2349                  Medical Decision Making    Patient is a 66-year-old female with a history of hypertension, CKD, CVA, diabetes mellitus, hyperlipidemia, and cardiac pacemaker that presents emerged department with improved dull aching left-sided pressure pain for 2 hours  Patient denies associated symptomatology  Patient is from a nursing home and reports that while she was taking a shower she had a near syncopal event with no collapse  Patient hemodynamically stable and afebrile  No sirs  No pain at my initial and subsequent evaluations  Heart score 9  troponin 62; with atrial paced rhythm, left axis deviation no ST changes; second troponin 65 with second ECG with left axis deviation with no ST changes comparison to initial ECG  Chest x-ray with no acute pulmonary disease on initial read  H&H at patient baseline  NEHEMIAS with serum creatinine 1 81, elevated BUN at 42  Hypertriglyceridemia, 257  X-ray with no acute cardiopulmonary disease on initial read  Delivered Brilinta and low molecular weight heparin in the emergency departmentFollow-up with PCP  Follow up with emergency department if symptoms persist or exacerbate  Patient demonstrates verbal understanding of all clinical laboratory and imaging findings, admission instructions, and verbally agrees with current treatment plan with teach back    Amount and/or Complexity of Data Reviewed  Labs: ordered  Decision-making details documented in ED Course  Radiology: ordered and independent interpretation performed  Decision-making details documented in ED Course  ECG/medicine tests:  Decision-making details documented in ED Course  Risk  Prescription drug management  Decision regarding hospitalization            Disposition  Final diagnoses:   NSTEM[facility] (non[facility]   Hyperlipidemia     Time reflects when diagnosis was documented in both MDM as applicable and the Disposition within this note     Time User Action Codes Description Comment    3/3/2023 11:02 PM Carroll Rachel Add [I21 4] NSTEMI (non-ST elevated myocardial infarction) ([facility] )     3/3/2023 11:03 PM Craroll Rachel Add [E78 5] Hyperlipidemia 3/3/2023 11:55 PM Rickyalfonzo Oralia Add [I35 0] Nonrheumatic aortic (valve) stenosis     3/3/2023 11:55 PM Rickyalfonzo Oralia Add [R07 9] Chest pain, unspecified type       ED Disposition     ED Disposition   Admit    Condition   Stable    Date/Time   Fri Mar 3, 2023 11:03 PM    Comment   Case was discussed with Laiksha Buenrostro and the patient's admission status was agreed to be Admission Status: observation status to the service of Dr Adriano Todd, internal Medicine   Follow-up Information    None         Current Discharge Medication List      CONTINUE these medications which have NOT CHANGED    Details   acetaminophen (TYLENOL) 325 mg tablet Take 650 mg by mouth every 4 (four) hours as needed      albuterol (2 5 mg/3 mL) 0 083 % nebulizer solution Take 2 5 mg by nebulization every 4 (four) hours as needed      ascorbic acid (VITAMIN C) 500 MG tablet Take 500 mg by mouth daily      aspirin (ECOTRIN LOW STRENGTH) 81 mg EC tablet Take 81 mg by mouth daily      bisacodyl (DULCOLAX) 5 mg EC tablet Take 2 tablets (10 mg total) by mouth once for 1 dose Per office instructions  Qty: 2 tablet, Refills: 0    Associated Diagnoses: Rectal bleeding;  History of colon polyps      cholecalciferol (VITAMIN D3) 1,000 units tablet       dextran 70-hypromellose (Artificial Tears) 0 1-0 3 % ophthalmic solution 1 drop every 3 (three) hours as needed        diphenhydrAMINE (BENADRYL) 12 5 mg/5 mL oral liquid Take by mouth 4 (four) times a day as needed for allergies      docusate sodium (COLACE) 100 mg capsule Take 100 mg by mouth daily      DropSafe Safety Pen Needles 31G X 6 MM MISC       emollient (BIAFINE) cream Apply topically as needed for dry skin      escitalopram (LEXAPRO) 10 mg tablet Take 10 mg by mouth daily      famciclovir (FAMVIR) 500 mg tablet Take 500 mg by mouth 3 (three) times a day      ferrous sulfate 325 (65 Fe) mg tablet Take 325 mg by mouth daily      furosemide (LASIX) 40 mg tablet Take 1 tablet (40 mg total) by mouth daily  Qty: 30 tablet, Refills: 0    Associated Diagnoses: Decompensated hepatic cirrhosis (HCC)      gabapentin (NEURONTIN) 300 mg capsule       gabapentin (NEURONTIN) 600 MG tablet Take 600 mg by mouth 3 (three) times a day      glucagon 1 MG injection Inject 1 mg into a muscle once as needed      guaiFENesin (ROBITUSSIN) 100 MG/5ML oral liquid Take 10 mL by mouth every 4 (four) hours as needed        HumaLOG KwikPen 100 units/mL injection pen       hydrALAZINE (APRESOLINE) 25 mg tablet Take 75 mg by mouth 3 (three) times a day      insulin aspart (NovoLOG) 100 units/mL injection Inject 15 Units under the skin 3 (three) times a day before meals      insulin aspart, w/niacinamide, (FIASP) 100 Units/mL injection pen Inject 8 Units under the skin      insulin glargine (LANTUS SOLOSTAR) 100 units/mL injection pen Inject 34 Units under the skin 2 (two) times a day Every morning and at bedtime      linaGLIPtin 5 MG TABS Take 5 mg by mouth daily      !! lisinopril (ZESTRIL) 10 mg tablet       !! lisinopril (ZESTRIL) 20 mg tablet       !! lisinopril (ZESTRIL) 30 mg tablet       !! lisinopril (ZESTRIL) 40 mg tablet       loperamide (IMODIUM) 2 mg capsule       meclizine (ANTIVERT) 25 mg tablet       Melatonin 3 MG CAPS Take 3 mg by mouth daily at bedtime      melatonin 3 mg       Menthol-Methyl Salicylate ([employer] MUSCLE RUB) 10-15 % CREA Apply 1 application topically 3 (three) times a day Apply to shoulders and neck      metFORMIN (GLUCOPHAGE) 1000 MG tablet Take by mouth every 12 (twelve) hours      !! metoprolol tartrate (LOPRESSOR) 50 mg tablet Take 75 mg by mouth every 12 (twelve) hours      ! !  Metoprolol Tartrate 75 MG TABS       NovoLOG FlexPen 100 units/mL injection pen       nystatin (MYCOSTATIN) ointment Apply thin film to moist fissure lesions within the pannis 2 times daily until resolved  Qty: 30 g, Refills: 3    Associated Diagnoses: Panniculitis      nystatin (MYCOSTATIN) powder Apply topically 2 (two) times a day  Qty: 15 g, Refills: 0    Associated Diagnoses: Decompensated hepatic cirrhosis (HCC)      omeprazole (PriLOSEC) 40 MG capsule       pantoprazole (PROTONIX) 40 mg tablet Take 1 tablet (40 mg total) by mouth daily  Qty: 90 tablet, Refills: 0    Associated Diagnoses: Gastritis without bleeding, unspecified chronicity, unspecified gastritis type      polyethylene glycol (GOLYTELY) 4000 mL solution Take 4,000 mL by mouth once for 1 dose Per office instructions  Qty: 4000 mL, Refills: 0    Associated Diagnoses: Rectal bleeding; History of colon polyps      polyethylene glycol (MIRALAX) 17 g packet Take 17 g by mouth daily as needed      polyvinyl alcohol (LIQUIFILM TEARS) 1 4 % ophthalmic solution Administer 1 drop to both eyes as needed        Senna S 8 6-50 MG per tablet       silver sulfadiazine (SILVADENE,SSD) 1 % cream       spironolactone (ALDACTONE) 25 mg tablet 12 5 mg       tamsulosin (FLOMAX) 0 4 mg Take 2 capsules by mouth daily       ! ! - Potential duplicate medications found  Please discuss with provider  No discharge procedures on file      PDMP Review     None          ED Provider  Electronically Signed by           Ortiz Mares PA-C  03/04/23 [id number]

## 2023-03-04 NOTE — ASSESSMENT & PLAN NOTE
· Severe stenosis per recent echocardiogram follow-ups with cardiology with no surgical intervention recommended due to poor candidacy    Continue medical management and monitoring of volume status

## 2023-03-04 NOTE — ASSESSMENT & PLAN NOTE
· Patient w/ acute episode of near-syncope while showering; L sided CP also noticed + SOB  · EKG: a paced, LBBB  · 0hr trop 62; 2hr trop 65  · Maintain telemetry  · Heparin gtt initiated in the ED-- continue for now  · ASA, statin, nitro PRN  · On BB  · Check hgb a1c; lipid panel w/ hypertriglyceridemia   · Cardiology consult-- appreciate input

## 2023-03-04 NOTE — PLAN OF CARE
Problem: MOBILITY - ADULT  Goal: Maintain or return to baseline ADL function  Description: INTERVENTIONS:  -  Assess patient's ability to carry out ADLs; assess patient's baseline for ADL function and identify physical deficits which impact ability to perform ADLs (bathing, care of mouth/teeth, toileting, grooming, dressing, etc )  - Assess/evaluate cause of self-care deficits   - Assess range of motion  - Assess patient's mobility; develop plan if impaired  - Assess patient's need for assistive devices and provide as appropriate  - Encourage maximum independence but intervene and supervise when necessary  - Involve family in performance of ADLs  - Assess for home care needs following discharge   - Consider OT consult to assist with ADL evaluation and planning for discharge  - Provide patient education as appropriate  Outcome: Progressing    Goal: Maintains/Returns to pre admission functional level  Description: INTERVENTIONS:  - Perform BMAT or MOVE assessment daily    - Set and communicate daily mobility goal to care team and patient/family/caregiver     - Collaborate with rehabilitation services on mobility goals if consulted  - Perform Range of Motion   - Out of bed for toileting  - Record patient progress and toleration of activity level   Outcome: Progressing     Problem: Prexisting or High Potential for Compromised Skin Integrity  Goal: Skin integrity is maintained or improved  Description: INTERVENTIONS:  - Identify patients at risk for skin breakdown  - Assess and monitor skin integrity  - Assess and monitor nutrition and hydration status  - Monitor labs   - Assess for incontinence   - Turn and reposition patient  - Assist with mobility/ambulation  - Relieve pressure over bony prominences  - Avoid friction and shearing  - Provide appropriate hygiene as needed including keeping skin clean and dry  - Evaluate need for skin moisturizer/barrier cream  - Collaborate with interdisciplinary team   - Patient/family teaching  - Consider wound care consult   Outcome: Progressing    Problem: Nutrition/Hydration-ADULT  Goal: Nutrient/Hydration intake appropriate for improving, restoring or maintaining nutritional needs  Description: Monitor and assess patient's nutrition/hydration status for malnutrition  Collaborate with interdisciplinary team and initiate plan and interventions as ordered  Monitor patient's weight and dietary intake as ordered or per policy  Utilize nutrition screening tool and intervene as necessary  Determine patient's food preferences and provide high-protein, high-caloric foods as appropriate       INTERVENTIONS:  - Monitor oral intake, urinary output, labs, and treatment plans  - Assess nutrition and hydration status and recommend course of action  - Evaluate amount of meals eaten  - Assist patient with eating if necessary   - Allow adequate time for meals  - Recommend/ encourage appropriate diets, oral nutritional supplements, and vitamin/mineral supplements  - Order, calculate, and assess calorie counts as needed  - Recommend, monitor, and adjust tube feedings and TPN/PPN based on assessed needs  - Assess need for intravenous fluids  - Provide specific nutrition/hydration education as appropriate  - Include patient/family/caregiver in decisions related to nutrition  Outcome: Progressing

## 2023-03-05 PROBLEM — K76.9 HEPATIC LESION: Status: ACTIVE | Noted: 2023-03-05

## 2023-03-05 LAB
ANION GAP SERPL CALCULATED.3IONS-SCNC: 10 MMOL/L (ref 4–13)
ATRIAL RATE: 69 BPM
BASOPHILS # BLD AUTO: 0.01 THOUSANDS/ÂΜL (ref 0–0.1)
BASOPHILS NFR BLD AUTO: 0 % (ref 0–1)
BUN SERPL-MCNC: 41 MG/DL (ref 5–25)
CALCIUM SERPL-MCNC: 8.9 MG/DL (ref 8.4–10.2)
CHLORIDE SERPL-SCNC: 102 MMOL/L (ref 96–108)
CO2 SERPL-SCNC: 26 MMOL/L (ref 21–32)
CREAT SERPL-MCNC: 1.81 MG/DL (ref 0.6–1.3)
EOSINOPHIL # BLD AUTO: 0.09 THOUSAND/ÂΜL (ref 0–0.61)
EOSINOPHIL NFR BLD AUTO: 2 % (ref 0–6)
ERYTHROCYTE [DISTWIDTH] IN BLOOD BY AUTOMATED COUNT: 16.6 % (ref 11.6–15.1)
GFR SERPL CREATININE-BSD FRML MDRD: 26 ML/MIN/1.73SQ M
GLUCOSE SERPL-MCNC: 100 MG/DL (ref 65–140)
GLUCOSE SERPL-MCNC: 122 MG/DL (ref 65–140)
GLUCOSE SERPL-MCNC: 150 MG/DL (ref 65–140)
GLUCOSE SERPL-MCNC: 164 MG/DL (ref 65–140)
GLUCOSE SERPL-MCNC: 166 MG/DL (ref 65–140)
GLUCOSE SERPL-MCNC: 205 MG/DL (ref 65–140)
HCT VFR BLD AUTO: 23.7 % (ref 34.8–46.1)
HGB BLD-MCNC: 7.4 G/DL (ref 11.5–15.4)
IMM GRANULOCYTES # BLD AUTO: 0.04 THOUSAND/UL (ref 0–0.2)
IMM GRANULOCYTES NFR BLD AUTO: 1 % (ref 0–2)
LYMPHOCYTES # BLD AUTO: 0.87 THOUSANDS/ÂΜL (ref 0.6–4.47)
LYMPHOCYTES NFR BLD AUTO: 18 % (ref 14–44)
MCH RBC QN AUTO: 31.1 PG (ref 26.8–34.3)
MCHC RBC AUTO-ENTMCNC: 31.2 G/DL (ref 31.4–37.4)
MCV RBC AUTO: 100 FL (ref 82–98)
MONOCYTES # BLD AUTO: 0.42 THOUSAND/ÂΜL (ref 0.17–1.22)
MONOCYTES NFR BLD AUTO: 9 % (ref 4–12)
MRSA NOSE QL CULT: NORMAL
NEUTROPHILS # BLD AUTO: 3.41 THOUSANDS/ÂΜL (ref 1.85–7.62)
NEUTS SEG NFR BLD AUTO: 70 % (ref 43–75)
NRBC BLD AUTO-RTO: 0 /100 WBCS
P AXIS: 79 DEGREES
PLATELET # BLD AUTO: 83 THOUSANDS/UL (ref 149–390)
PMV BLD AUTO: 10.6 FL (ref 8.9–12.7)
POTASSIUM SERPL-SCNC: 4.4 MMOL/L (ref 3.5–5.3)
PR INTERVAL: 218 MS
QRS AXIS: -78 DEGREES
QRSD INTERVAL: 198 MS
QT INTERVAL: 528 MS
QTC INTERVAL: 565 MS
RBC # BLD AUTO: 2.38 MILLION/UL (ref 3.81–5.12)
SODIUM SERPL-SCNC: 138 MMOL/L (ref 135–147)
T WAVE AXIS: 96 DEGREES
VENTRICULAR RATE: 69 BPM
WBC # BLD AUTO: 4.84 THOUSAND/UL (ref 4.31–10.16)

## 2023-03-05 PROCEDURE — 99232 SBSQ HOSP IP/OBS MODERATE 35: CPT | Performed by: INTERNAL MEDICINE

## 2023-03-05 PROCEDURE — 82948 REAGENT STRIP/BLOOD GLUCOSE: CPT

## 2023-03-05 PROCEDURE — 80048 BASIC METABOLIC PNL TOTAL CA: CPT | Performed by: NURSE PRACTITIONER

## 2023-03-05 PROCEDURE — 85025 COMPLETE CBC W/AUTO DIFF WBC: CPT | Performed by: INTERNAL MEDICINE

## 2023-03-05 PROCEDURE — 93010 ELECTROCARDIOGRAM REPORT: CPT | Performed by: INTERNAL MEDICINE

## 2023-03-05 RX ORDER — FUROSEMIDE 10 MG/ML
40 INJECTION INTRAMUSCULAR; INTRAVENOUS ONCE
Status: COMPLETED | OUTPATIENT
Start: 2023-03-05 | End: 2023-03-05

## 2023-03-05 RX ORDER — HEPARIN SODIUM 5000 [USP'U]/ML
5000 INJECTION, SOLUTION INTRAVENOUS; SUBCUTANEOUS EVERY 8 HOURS SCHEDULED
Status: DISCONTINUED | OUTPATIENT
Start: 2023-03-05 | End: 2023-03-07 | Stop reason: HOSPADM

## 2023-03-05 RX ORDER — FUROSEMIDE 10 MG/ML
40 INJECTION INTRAMUSCULAR; INTRAVENOUS
Status: DISCONTINUED | OUTPATIENT
Start: 2023-03-05 | End: 2023-03-06

## 2023-03-05 RX ADMIN — HEPARIN SODIUM 5000 UNITS: 5000 INJECTION INTRAVENOUS; SUBCUTANEOUS at 21:30

## 2023-03-05 RX ADMIN — FERROUS SULFATE TAB 325 MG (65 MG ELEMENTAL FE) 325 MG: 325 (65 FE) TAB at 08:22

## 2023-03-05 RX ADMIN — INSULIN LISPRO 52 UNITS: 100 INJECTION, SOLUTION INTRAVENOUS; SUBCUTANEOUS at 08:20

## 2023-03-05 RX ADMIN — TAMSULOSIN HYDROCHLORIDE 0.8 MG: 0.4 CAPSULE ORAL at 08:23

## 2023-03-05 RX ADMIN — ASPIRIN 81 MG: 81 TABLET, COATED ORAL at 08:24

## 2023-03-05 RX ADMIN — ACETAMINOPHEN 650 MG: 325 TABLET ORAL at 13:29

## 2023-03-05 RX ADMIN — FUROSEMIDE 40 MG: 10 INJECTION, SOLUTION INTRAMUSCULAR; INTRAVENOUS at 10:55

## 2023-03-05 RX ADMIN — PANTOPRAZOLE SODIUM 40 MG: 40 TABLET, DELAYED RELEASE ORAL at 05:57

## 2023-03-05 RX ADMIN — HEPARIN SODIUM 5000 UNITS: 5000 INJECTION INTRAVENOUS; SUBCUTANEOUS at 13:28

## 2023-03-05 RX ADMIN — GABAPENTIN 300 MG: 300 CAPSULE ORAL at 18:41

## 2023-03-05 RX ADMIN — FUROSEMIDE 40 MG: 10 INJECTION, SOLUTION INTRAMUSCULAR; INTRAVENOUS at 18:43

## 2023-03-05 RX ADMIN — MELATONIN 3 MG: at 21:30

## 2023-03-05 RX ADMIN — INSULIN GLARGINE 38 UNITS: 100 INJECTION, SOLUTION SUBCUTANEOUS at 08:20

## 2023-03-05 RX ADMIN — CHOLECALCIFEROL TAB 25 MCG (1000 UNIT) 1000 UNITS: 25 TAB at 08:24

## 2023-03-05 RX ADMIN — METOPROLOL TARTRATE 75 MG: 50 TABLET, FILM COATED ORAL at 21:30

## 2023-03-05 RX ADMIN — GABAPENTIN 300 MG: 300 CAPSULE ORAL at 08:23

## 2023-03-05 RX ADMIN — INSULIN GLARGINE 38 UNITS: 100 INJECTION, SOLUTION SUBCUTANEOUS at 21:30

## 2023-03-05 RX ADMIN — ESCITALOPRAM 10 MG: 10 TABLET, FILM COATED ORAL at 08:24

## 2023-03-05 RX ADMIN — OXYCODONE HYDROCHLORIDE AND ACETAMINOPHEN 500 MG: 500 TABLET ORAL at 08:24

## 2023-03-05 RX ADMIN — INSULIN LISPRO 52 UNITS: 100 INJECTION, SOLUTION INTRAVENOUS; SUBCUTANEOUS at 18:42

## 2023-03-05 NOTE — ASSESSMENT & PLAN NOTE
Presented to the emergency department with chest pressure, shortness of breath and hypoxia  · CAT scan obtained which showed pulmonary edema  · D-dimer age adjusted as normal, heparin gtt discontinued due to improving hypoxia with diuresis and risk of hemoptysis  · Noted to have severe aortic stenosis  · Cardiology consultation appreciated and felt to be consistent with congestive heart failure, improving with diuresis    She is currently requiring 1 5 L

## 2023-03-05 NOTE — PROGRESS NOTES
[facility]  Progress Note - Juanita Luu 1945, 68 y o  female MRN: [id number]  Unit/Bed#: S -01 Encounter: [id number]  Primary Care Provider: Antoinette Sow MD   Date and time admitted to hospital: 3/3/2023  7:01 PM    Hypoxia  Assessment & Plan  Presented to the emergency department with chest pressure, shortness of breath and hypoxia  · CAT scan obtained which showed pulmonary edema  · D-dimer age adjusted as normal, heparin gtt discontinued due to improving hypoxia with diuresis and risk of hemoptysis  · Noted to have severe aortic stenosis  · Cardiology consultation appreciated and felt to be consistent with congestive heart failure, improving with diuresis  She is currently requiring 1 5 L    * Chest pain  Assessment & Plan  · Patient with acute episode of near syncope while showering with left-sided chest pain and shortness of breath  EKG showed left bundle branch block  2-hour delta troponin 3  Maintained on telemetry  Heparin drip initiated however discontinued due to hemoptysis and age-adjusted D-dimer  Continue on aspirin and statin and nitro  · Cardiology consult and support appreciated, continue to treat for congestive heart failure likely due to severe aortic stenosis    Hepatic lesion  Assessment & Plan  Reported on her CAT scan  Noted to have multiple ultrasounds as late as 2021 without suspicious hepatic masses  · Will require further imaging as outpatient to include either triple phase CT or MRI    Nonrheumatic aortic (valve) stenosis  Assessment & Plan  · Severe stenosis per recent echocardiogram follow-ups with cardiology with no surgical intervention recommended due to poor candidacy  Continue medical management and monitoring of volume status    History of CVA (cerebrovascular accident)  Assessment & Plan  · Continue supportive care    Hypertension  Assessment & Plan  · Pressures acceptable on regimen of spironolactone, Lopressor    Currently on IV diuresis    Type 2 diabetes mellitus [facility])  Assessment & Plan  Lab Results   Component Value Date    HGBA1C 5 9 (H) 03/03/2023       Recent Labs     03/04/23  1219 03/04/23  1642 03/04/23  2101 03/05/23  0729   POCGLU 196* 133 129 150*       Blood Sugar Average: Last 72 hrs:  · (P) 015 [id number] controlled per most recent hgb a1c  · Continue home insulin regimen  · Sliding scale initiated    History of cardiac pacemaker  Assessment & Plan  · History of AV block    Iron deficiency anemia  Assessment & Plan  · Hemoglobin 8 at baseline with a history of AVM and GI bleed  Continue to trend        VTE Pharmacologic Prophylaxis: VTE Score: 5 High Risk (Score >/= 5) - Pharmacological DVT Prophylaxis Ordered: heparin  Sequential Compression Devices Ordered  Patient Centered Rounds: I performed bedside rounds with nursing staff today  Discussions with Specialists or Other Care Team Provider: Cardiology    Education and Discussions with Family / Patient: I will call her son this afternoon  Total Time Spent on Date of Encounter in care of patient: 35 minutes This time was spent on one or more of the following: performing physical exam; counseling and coordination of care; obtaining or reviewing history; documenting in the medical record; reviewing/ordering tests, medications or procedures; communicating with other healthcare professionals and discussing with patient's family/caregivers  Current Length of Stay: 1 day(s)  Current Patient Status: Inpatient   Certification Statement: The patient will continue to require additional inpatient hospital stay due to Hypoxia, congestive heart failure  Discharge Plan: Anticipate discharge in 24-48 hrs to discharge location to be determined pending rehab evaluations  Code Status: Level 3 - DNAR and DNI    Subjective:   She is hard of hearing  She is feeling better, she notes her shortness of breath has improved  She denies chest pain    She denies fevers or chills  She denies abdominal pain  She feels she has an okay appetite  Objective:     Vitals:   Temp (24hrs), Av 5 °F (36 9 °C), Min:98 2 °F (36 8 °C), Max:98 7 °F (37 1 °C)    Temp:  [98 2 °F (36 8 °C)-98 7 °F (37 1 °C)] 98 7 °F (37 1 °C)  HR:  [72-81] 81  BP: (105-127)/(47-57) 119/57  SpO2:  [91 %-95 %] 95 %  There is no height or weight on file to calculate BMI  Input and Output Summary (last 24 hours):   No intake or output data in the 24 hours ending 23 1117    Physical Exam:   Physical Exam  Vitals and nursing note reviewed  Constitutional:       Appearance: Normal appearance  She is obese  She is not ill-appearing or diaphoretic  HENT:      Ears:      Comments: Hard of hearing     Mouth/Throat:      Mouth: Mucous membranes are moist       Pharynx: No oropharyngeal exudate  Eyes:      General: No scleral icterus  Conjunctiva/sclera: Conjunctivae normal    Pulmonary:      Effort: Pulmonary effort is normal  No respiratory distress  Breath sounds: Rales present  Abdominal:      General: Bowel sounds are normal  There is no distension  Palpations: Abdomen is soft  Tenderness: There is no abdominal tenderness  Musculoskeletal:      Right lower leg: Edema present  Left lower leg: Edema present  Skin:     General: Skin is warm  Coloration: Skin is not jaundiced  Neurological:      Mental Status: She is alert     Psychiatric:         Mood and Affect: Mood normal          Behavior: Behavior normal           Additional Data:     Labs:  Results from last 7 days   Lab Units 23  0459   WBC Thousand/uL 4 84   HEMOGLOBIN g/dL 7 4*   HEMATOCRIT % 23 7*   PLATELETS Thousands/uL 83*   NEUTROS PCT % 70   LYMPHS PCT % 18   MONOS PCT % 9   EOS PCT % 2     Results from last 7 days   Lab Units 23  0459 23  1918   SODIUM mmol/L 138 138   POTASSIUM mmol/L 4 4 4 6   CHLORIDE mmol/L 102 103   CO2 mmol/L 26 25   BUN mg/dL 41* 42*   CREATININE mg/dL 1 81* 1 81*   ANION GAP mmol/L 10 10   CALCIUM mg/dL 8 9 9 0   ALBUMIN g/dL  --  3 8   TOTAL BILIRUBIN mg/dL  --  0 59   ALK PHOS U/L  --  58   ALT U/L  --  11   AST U/L  --  20   GLUCOSE RANDOM mg/dL 122 152*     Results from last 7 days   Lab Units 03/03/23  2213   INR  1 34*     Results from last 7 days   Lab Units 03/05/23  0729 03/04/23  2101 03/04/23  1642 03/04/23  1219 03/04/23  0815 03/04/23  0240   POC GLUCOSE mg/dl 150* 129 133 196* 268* 229*     Results from last 7 days   Lab Units 03/03/23  1918   HEMOGLOBIN A1C % 5 9*     Results from last 7 days   Lab Units 03/04/23  0556   PROCALCITONIN ng/ml 0 15       Lines/Drains:  Invasive Devices     Peripheral Intravenous Line  Duration           Peripheral IV 03/04/23 Left;Ventral (anterior) Forearm 1 day                  Telemetry:  Telemetry Orders (From admission, onward)             48 Hour Telemetry Monitoring  Continuous x 48 hours        References:    Telemetry Guidelines   Question:  Reason for 48 Hour Telemetry  Answer:  Acute MI, chest pain - R/O MI, or unstable angina                          Imaging: Personally reviewed the following imaging: ultrasound(s)  Reviewed previous ultrasounds and imaging for hepatic lesions    Recent Cultures (last 7 days):         Last 24 Hours Medication List:   Current Facility-Administered Medications   Medication Dose Route Frequency Provider Last Rate   • acetaminophen  650 mg Oral Q4H PRN Matias Lopez PA-C     • ascorbic acid  500 mg Oral Daily Kiara Lopez PA-C     • aspirin  81 mg Oral Daily Kiara Lopez PA-C     • cholecalciferol  1,000 Units Oral Daily Kiara Lopez PA-C     • escitalopram  10 mg Oral Daily Kiara Lopez PA-C     • ferrous sulfate  325 mg Oral Daily Kiara Lopez PA-C     • furosemide  40 mg Intravenous BID (diuretic) LEANNE Thomas     • gabapentin  300 mg Oral BID Matias Lopez PA-C     • glycerin-hypromellose-PEG 400  1 drop Both Eyes Q3H PRN Sarahy Lopez PA-C     • insulin glargine  38 Units Subcutaneous Q12H [address] [facility] [address] Jessica PA-C     • insulin lispro  52 Units Subcutaneous TID With Meals Sarahy Lopez PA-C     • melatonin  3 mg Oral HS Kiara Lopez PA-C     • metoprolol tartrate  75 mg Oral Q12H [address] [facility] [address] Jessica PA-C     • nitroglycerin  0 4 mg Sublingual Q5 Min PRN Kiara Lopez PA-C     • ondansetron  4 mg Intravenous Q6H PRN Sarahy Lopez PA-C     • pantoprazole  40 mg Oral Early Morning Kiara Lopez PA-C     • polyethylene glycol  17 g Oral Daily PRN Sarahy Lopez PA-C     • spironolactone  25 mg Oral Daily Kiara Lopez PA-C     • tamsulosin  0 8 mg Oral Daily Kiara Lopez PA-C          Today, Patient Was Seen By: Mindy Arriola MD    **Please Note: This note may have been constructed using a voice recognition system  **

## 2023-03-05 NOTE — ASSESSMENT & PLAN NOTE
· Patient with acute episode of near syncope while showering with left-sided chest pain and shortness of breath  EKG showed left bundle branch block  2-hour delta troponin 3  Maintained on telemetry  Heparin drip initiated however discontinued due to hemoptysis and age-adjusted D-dimer  Continue on aspirin and statin and nitro     · Cardiology consult and support appreciated, continue to treat for congestive heart failure likely due to severe aortic stenosis

## 2023-03-05 NOTE — PROGRESS NOTES
General Cardiology   Progress Note -  Team One   Robert Galeana 68 y o  female MRN: [id number]    Unit/Bed#: S -01 Encounter: [id number]    Assessment/ Plan:    1  Non ischemic myocardial injury: mild flat troponin elevation in setting of severe AS/CHF  No further chest pain  EKG vpacing  Limited echo ordered; no further workup indicated  2   Acute heart failure: Patient appears to be volume overloaded with some lower extremity edema and rales on exam   CT of the chest showed diffuse pulmonary opacities favored to represent pulmonary edema; discussed with primary team and they do not feel she has pneumonia at this time as she is afebrile and procalcitonin is negative  She did become more hypoxic 3/3 but then improved down to 2 L after getting IV Lasix  She takes Lasix 40 mg p o  daily as an outpatient; agree with orders 40 mg IV twice daily  She still does not have any I/os or weights documented  Cr unchanged  Today she has minimal Le edema; LS clear; on 1 5L NC  Discussed with RN and she had the patient yesterday as well and reports massive amounts of incontinent urine  We will continue Lasix 40 mg IV twice daily throughout today  Re-eval in a m  for transition to oral      3   Hypoxia: Was on 5 L nasal cannula now down to 1 5  Likely in the setting of volume overload from acute heart failure  Her D-dimer was elevated mildly and discussed with primary team low suspicion for PE and not pursuing a VQ scan at this time  We will continue to monitor oxygen needs with diuresis; needs are decreasing  4  Severe aortic stenosis: Known history of  She follows with Dr Beth Mccormick at [facility]    Most recent office note indicated that due to her low functional status and comorbidities surgical intervention was not advised/discussed with patient and decided against   Patient seems unclear about this but either way I would have her follow-up with Dr Beth Mccormick as an outpatient to discuss if they would like to pursue any work-up for TAVR  She is likely a poor candidate with limited rehab potential  An echo has been ordered but will likely not ; will d/w attending if even necessary  5   Complete heart block status post PPM: V pacing  6  Anemia: At baseline  7  Diabetes mellitus type 2: Defer management to primary team  8  Hypertension: Acceptable control most recent 105/50  9  History of CVA: Unclear details    10  Hepatic lesions on CT of the chest: I Did Tiger text primary team to ensure they are aware recommending MRI follow-up as this could potentially indicate metastatic disease      Subjective: Pt seen for follow up; no events noted in review of chart  Pt reports feeling well today; she has not had any further chest pain or SOB  Not coughing up any blood anymore since yesterday am  Ate breakfast this AM  No complaints  Review of Systems   Constitutional: Negative for decreased appetite and fever  Cardiovascular: Negative for chest pain, leg swelling, orthopnea and palpitations  Respiratory: Negative for cough, hemoptysis (none further since yesteday AM) and wheezing  Gastrointestinal: Negative for nausea and vomiting  Genitourinary: Negative for dysuria  Neurological: Negative for dizziness and light-headedness  Psychiatric/Behavioral: Negative for altered mental status  All other systems reviewed and are negative  Objective:   Vitals: Blood pressure 105/50, pulse 79, temperature 98 7 °F (37 1 °C), resp  rate 19, SpO2 93 %  ,     There is no height or weight on file to calculate BMI ,     Systolic (45PXW), SANDRA:135 , Min:105 , BNV:327     Diastolic (20HHB), NDX:17, Min:47, Max:54    No intake or output data in the 24 hours ending 03/05/23 0920  Weight (last 2 days)     None        Telemetry Review:  vpacing    Physical Exam  Vitals reviewed  Constitutional:       Appearance: Normal appearance  She is obese        Comments: Pt laying in bed in NAD; alert and cooperative   HENT:      Head: Normocephalic  Mouth/Throat:      Mouth: Mucous membranes are moist    Cardiovascular:      Rate and Rhythm: Normal rate and regular rhythm  Heart sounds: Murmur heard  Systolic murmur is present with a grade of 3/6  Comments: B/l NP edema, mild  Pulmonary:      Effort: Pulmonary effort is normal       Breath sounds: No wheezing or rales  Comments: On 1 5L NC  Abdominal:      Palpations: Abdomen is soft  Musculoskeletal:      Right lower leg: Edema present  Left lower leg: Edema present  Skin:     General: Skin is warm  Neurological:      Mental Status: She is alert  Mental status is at baseline     Psychiatric:         Mood and Affect: Mood normal        LABORATORY RESULTS      CBC with diff:   Results from last 7 days   Lab Units 03/05/23 0459 03/03/23 2213 03/03/23 1918   WBC Thousand/uL 4 84 4 87 4 60   HEMOGLOBIN g/dL 7 4* 8 0* 8 2*   HEMATOCRIT % 23 7* 25 4* 26 5*   MCV fL 100* 98 99*   PLATELETS Thousands/uL 83* 78* 79*   MCH pg 31 1 30 9 30 7   MCHC g/dL 31 2* 31 5 30 9*   RDW % 16 6* 16 3* 16 3*   MPV fL 10 6 10 5 10 3   NRBC AUTO /100 WBCs 0  --  0     CMP:  Results from last 7 days   Lab Units 03/05/23 0459 03/03/23 1918   POTASSIUM mmol/L 4 4 4 6   CHLORIDE mmol/L 102 103   CO2 mmol/L 26 25   BUN mg/dL 41* 42*   CREATININE mg/dL 1 81* 1 81*   CALCIUM mg/dL 8 9 9 0   AST U/L  --  20   ALT U/L  --  11   ALK PHOS U/L  --  58   EGFR ml/min/1 73sq m 26 26     BMP:  Results from last 7 days   Lab Units 03/05/23 0459 03/03/23 1918   POTASSIUM mmol/L 4 4 4 6   CHLORIDE mmol/L 102 103   CO2 mmol/L 26 25   BUN mg/dL 41* 42*   CREATININE mg/dL 1 81* 1 81*   CALCIUM mg/dL 8 9 9 0     Results from last 7 days   Lab Units 03/03/23 1918   HEMOGLOBIN A1C % 5 9*     Results from last 7 days   Lab Units 03/03/23 2213   INR  1 34*     Lipid Profile:   No results found for: CHOL  Lab Results   Component Value Date    HDL 24 (L) 03/03/2023 Lab Results   Component Value Date    LDLCALC 32 03/03/2023     Lab Results   Component Value Date    TRIG 257 (H) 03/03/2023     Meds/Allergies   current meds:   Current Facility-Administered Medications   Medication Dose Route Frequency   • acetaminophen (TYLENOL) tablet 650 mg  650 mg Oral Q4H PRN   • ascorbic acid (VITAMIN C) tablet 500 mg  500 mg Oral Daily   • aspirin (ECOTRIN LOW STRENGTH) EC tablet 81 mg  81 mg Oral Daily   • cholecalciferol (VITAMIN D3) tablet 1,000 Units  1,000 Units Oral Daily   • escitalopram (LEXAPRO) tablet 10 mg  10 mg Oral Daily   • ferrous sulfate tablet 325 mg  325 mg Oral Daily   • furosemide (LASIX) injection 40 mg  40 mg Intravenous BID (diuretic)   • gabapentin (NEURONTIN) capsule 300 mg  300 mg Oral BID   • glycerin-hypromellose- (ARTIFICIAL TEARS) ophthalmic solution 1 drop  1 drop Both Eyes Q3H PRN   • insulin glargine (LANTUS) subcutaneous injection 38 Units 0 38 mL  38 Units Subcutaneous Q12H [facility]   • insulin lispro (HumaLOG) 100 units/mL subcutaneous injection 52 Units  52 Units Subcutaneous TID With Meals   • melatonin tablet 3 mg  3 mg Oral HS   • metoprolol tartrate (LOPRESSOR) tablet 75 mg  75 mg Oral Q12H DAV   • nitroglycerin (NITROSTAT) SL tablet 0 4 mg  0 4 mg Sublingual Q5 Min PRN   • ondansetron (ZOFRAN) injection 4 mg  4 mg Intravenous Q6H PRN   • pantoprazole (PROTONIX) EC tablet 40 mg  40 mg Oral Early Morning   • polyethylene glycol (MIRALAX) packet 17 g  17 g Oral Daily PRN   • spironolactone (ALDACTONE) tablet 25 mg  25 mg Oral Daily   • tamsulosin (FLOMAX) capsule 0 8 mg  0 8 mg Oral Daily     Medications Prior to Admission   Medication   • acetaminophen (TYLENOL) 325 mg tablet   • albuterol (2 5 mg/3 mL) 0 083 % nebulizer solution   • ascorbic acid (VITAMIN C) 500 MG tablet   • aspirin (ECOTRIN LOW STRENGTH) 81 mg EC tablet   • bisacodyl (DULCOLAX) 5 mg EC tablet   • cholecalciferol (VITAMIN D3) 1,000 units tablet   • dextran 70-hypromellose (Artificial Tears) 0 1-0 3 % ophthalmic solution   • diphenhydrAMINE (BENADRYL) 12 5 mg/5 mL oral liquid   • docusate sodium (COLACE) 100 mg capsule   • DropSafe Safety Pen Needles 31G X 6 MM MISC   • emollient (BIAFINE) cream   • escitalopram (LEXAPRO) 10 mg tablet   • famciclovir (FAMVIR) 500 mg tablet   • ferrous sulfate 325 (65 Fe) mg tablet   • furosemide (LASIX) 40 mg tablet   • gabapentin (NEURONTIN) 300 mg capsule   • gabapentin (NEURONTIN) 600 MG tablet   • glucagon 1 MG injection   • guaiFENesin (ROBITUSSIN) 100 MG/5ML oral liquid   • HumaLOG KwikPen 100 units/mL injection pen   • hydrALAZINE (APRESOLINE) 25 mg tablet   • insulin aspart (NovoLOG) 100 units/mL injection   • insulin aspart, w/niacinamide, (FIASP) 100 Units/mL injection pen   • insulin glargine (LANTUS SOLOSTAR) 100 units/mL injection pen   • linaGLIPtin 5 MG TABS   • lisinopril (ZESTRIL) 10 mg tablet   • lisinopril (ZESTRIL) 20 mg tablet   • lisinopril (ZESTRIL) 30 mg tablet   • lisinopril (ZESTRIL) 40 mg tablet   • loperamide (IMODIUM) 2 mg capsule   • meclizine (ANTIVERT) 25 mg tablet   • Melatonin 3 MG CAPS   • melatonin 3 mg   • Menthol-Methyl Salicylate ([employer] MUSCLE RUB) 10-15 % CREA   • metFORMIN (GLUCOPHAGE) 1000 MG tablet   • metoprolol tartrate (LOPRESSOR) 50 mg tablet   • Metoprolol Tartrate 75 MG TABS   • NovoLOG FlexPen 100 units/mL injection pen   • nystatin (MYCOSTATIN) ointment   • nystatin (MYCOSTATIN) powder   • omeprazole (PriLOSEC) 40 MG capsule   • pantoprazole (PROTONIX) 40 mg tablet   • polyethylene glycol (GOLYTELY) 4000 mL solution   • polyethylene glycol (MIRALAX) 17 g packet   • polyvinyl alcohol (LIQUIFILM TEARS) 1 4 % ophthalmic solution   • Senna S 8 6-50 MG per tablet   • silver sulfadiazine (SILVADENE,SSD) 1 % cream   • spironolactone (ALDACTONE) 25 mg tablet   • tamsulosin (FLOMAX) 0 4 mg     Assessment:  Principal Problem:    Chest pain  Active Problems:    Iron deficiency anemia    History of cardiac pacemaker    Type 2 diabetes mellitus ([facility] [address] )    Hypertension    History of CVA (cerebrovascular accident)    Nonrheumatic aortic (valve) stenosis    Elevated troponin    Hypoxia    Counseling / Coordination of Care  Total floor / unit time spent today 20 minutes  Greater than 50% of total time was spent with the patient and / or family counseling and / or coordination of care  ** Please Note: Dragon 360 Dictation voice to text software may have been used in the creation of this document   **

## 2023-03-06 ENCOUNTER — APPOINTMENT (INPATIENT)
Dept: RADIOLOGY | Facility: HOSPITAL | Age: 78
DRG: 291 | End: 2023-03-06
Payer: COMMERCIAL

## 2023-03-06 PROBLEM — I50.31 ACUTE DIASTOLIC CONGESTIVE HEART FAILURE (HCC): Status: ACTIVE | Noted: 2023-03-03

## 2023-03-06 LAB
ANION GAP SERPL CALCULATED.3IONS-SCNC: 10 MMOL/L (ref 4–13)
BUN SERPL-MCNC: 42 MG/DL (ref 5–25)
CALCIUM SERPL-MCNC: 9 MG/DL (ref 8.4–10.2)
CHLORIDE SERPL-SCNC: 99 MMOL/L (ref 96–108)
CO2 SERPL-SCNC: 28 MMOL/L (ref 21–32)
CREAT SERPL-MCNC: 1.92 MG/DL (ref 0.6–1.3)
FLUAV RNA RESP QL NAA+PROBE: NEGATIVE
FLUBV RNA RESP QL NAA+PROBE: NEGATIVE
GFR SERPL CREATININE-BSD FRML MDRD: 24 ML/MIN/1.73SQ M
GLUCOSE SERPL-MCNC: 172 MG/DL (ref 65–140)
GLUCOSE SERPL-MCNC: 175 MG/DL (ref 65–140)
GLUCOSE SERPL-MCNC: 196 MG/DL (ref 65–140)
GLUCOSE SERPL-MCNC: 227 MG/DL (ref 65–140)
GLUCOSE SERPL-MCNC: 249 MG/DL (ref 65–140)
POTASSIUM SERPL-SCNC: 4.1 MMOL/L (ref 3.5–5.3)
RSV RNA RESP QL NAA+PROBE: NEGATIVE
SARS-COV-2 RNA RESP QL NAA+PROBE: NEGATIVE
SODIUM SERPL-SCNC: 137 MMOL/L (ref 135–147)

## 2023-03-06 PROCEDURE — 99232 SBSQ HOSP IP/OBS MODERATE 35: CPT | Performed by: PHYSICIAN ASSISTANT

## 2023-03-06 PROCEDURE — 99232 SBSQ HOSP IP/OBS MODERATE 35: CPT | Performed by: INTERNAL MEDICINE

## 2023-03-06 PROCEDURE — 82948 REAGENT STRIP/BLOOD GLUCOSE: CPT

## 2023-03-06 PROCEDURE — 73630 X-RAY EXAM OF FOOT: CPT

## 2023-03-06 PROCEDURE — 80048 BASIC METABOLIC PNL TOTAL CA: CPT | Performed by: NURSE PRACTITIONER

## 2023-03-06 PROCEDURE — 0241U HB NFCT DS VIR RESP RNA 4 TRGT: CPT | Performed by: PHYSICIAN ASSISTANT

## 2023-03-06 RX ORDER — AMOXICILLIN 250 MG
1 CAPSULE ORAL 2 TIMES DAILY
Status: DISCONTINUED | OUTPATIENT
Start: 2023-03-06 | End: 2023-03-07 | Stop reason: HOSPADM

## 2023-03-06 RX ADMIN — HEPARIN SODIUM 5000 UNITS: 5000 INJECTION INTRAVENOUS; SUBCUTANEOUS at 06:09

## 2023-03-06 RX ADMIN — ESCITALOPRAM 10 MG: 10 TABLET, FILM COATED ORAL at 09:03

## 2023-03-06 RX ADMIN — OXYCODONE HYDROCHLORIDE AND ACETAMINOPHEN 500 MG: 500 TABLET ORAL at 09:03

## 2023-03-06 RX ADMIN — METOPROLOL TARTRATE 75 MG: 50 TABLET, FILM COATED ORAL at 21:48

## 2023-03-06 RX ADMIN — GABAPENTIN 300 MG: 300 CAPSULE ORAL at 09:03

## 2023-03-06 RX ADMIN — INSULIN GLARGINE 38 UNITS: 100 INJECTION, SOLUTION SUBCUTANEOUS at 09:07

## 2023-03-06 RX ADMIN — INSULIN LISPRO 52 UNITS: 100 INJECTION, SOLUTION INTRAVENOUS; SUBCUTANEOUS at 11:58

## 2023-03-06 RX ADMIN — MELATONIN 3 MG: at 21:49

## 2023-03-06 RX ADMIN — PANTOPRAZOLE SODIUM 40 MG: 40 TABLET, DELAYED RELEASE ORAL at 06:09

## 2023-03-06 RX ADMIN — SENNOSIDES AND DOCUSATE SODIUM 1 TABLET: 8.6; 5 TABLET ORAL at 11:07

## 2023-03-06 RX ADMIN — CHOLECALCIFEROL TAB 25 MCG (1000 UNIT) 1000 UNITS: 25 TAB at 09:03

## 2023-03-06 RX ADMIN — TAMSULOSIN HYDROCHLORIDE 0.8 MG: 0.4 CAPSULE ORAL at 09:02

## 2023-03-06 RX ADMIN — FERROUS SULFATE TAB 325 MG (65 MG ELEMENTAL FE) 325 MG: 325 (65 FE) TAB at 09:03

## 2023-03-06 RX ADMIN — FUROSEMIDE 40 MG: 10 INJECTION, SOLUTION INTRAMUSCULAR; INTRAVENOUS at 09:02

## 2023-03-06 RX ADMIN — HEPARIN SODIUM 5000 UNITS: 5000 INJECTION INTRAVENOUS; SUBCUTANEOUS at 21:47

## 2023-03-06 RX ADMIN — HEPARIN SODIUM 5000 UNITS: 5000 INJECTION INTRAVENOUS; SUBCUTANEOUS at 14:24

## 2023-03-06 RX ADMIN — INSULIN GLARGINE 38 UNITS: 100 INJECTION, SOLUTION SUBCUTANEOUS at 21:48

## 2023-03-06 RX ADMIN — GABAPENTIN 300 MG: 300 CAPSULE ORAL at 18:16

## 2023-03-06 RX ADMIN — ASPIRIN 81 MG: 81 TABLET, COATED ORAL at 09:03

## 2023-03-06 RX ADMIN — SPIRONOLACTONE 25 MG: 25 TABLET ORAL at 09:05

## 2023-03-06 RX ADMIN — SENNOSIDES AND DOCUSATE SODIUM 1 TABLET: 8.6; 5 TABLET ORAL at 18:17

## 2023-03-06 RX ADMIN — METOPROLOL TARTRATE 75 MG: 50 TABLET, FILM COATED ORAL at 09:05

## 2023-03-06 RX ADMIN — INSULIN LISPRO 26 UNITS: 100 INJECTION, SOLUTION INTRAVENOUS; SUBCUTANEOUS at 18:47

## 2023-03-06 NOTE — PROGRESS NOTES
General Cardiology   Progress Note -  Team One   Sebas Christine 68 y o  female MRN: [id number]    Unit/Bed#: S -01 Encounter: [id number]    Assessment/ Plan    1  Acute on chronic diastolic heart failure  On furosemide 40 mg IV BID and spirolactone 25 mg PO daily   She takes furosemide 40 mg PO daily at home  Will start oral diuretic: furosemide 60 mg PO daily  I/Os inaccurate  No input documented   Daily weights: 231 lbs  Weight in office 227 lbs (1/9/2023)   On 2 gram Na diet and 2000 ml fluid restriction     2  Aortic stenosis   Follows with Dr Arturo Sahu at [address]   Patient declined surgical intervention     3  Hypertension   Average /57    4  Complete heart block s/p PPM     5  Type II diabetes   Hemoglobin A1C 5 9  On lantus and humalog     Subjective  Patient resting comfortable in bed  She reports no complaint of chest pain, SOB, orthopnea or lower extremity edema  She is reporting L foot pain  No fever or chills  Review of Systems   Constitutional: Negative for chills and fever  HENT: Negative for congestion  Cardiovascular: Negative for chest pain, leg swelling, orthopnea and palpitations  Respiratory: Negative for shortness of breath  Musculoskeletal: Negative for falls  Gastrointestinal: Negative for bloating, nausea and vomiting  Neurological: Negative for dizziness and light-headedness  Psychiatric/Behavioral: Negative for altered mental status  Very [place]    All other systems reviewed and are negative  Objective:   Vitals: Blood pressure (!) 109/47, pulse 69, temperature 98 2 °F (36 8 °C), resp  rate 16, weight 105 kg (231 lb 4 2 oz), SpO2 95 %  ,       Body mass index is 42 3 kg/m²  ,     Systolic (99OHR), RMU:427 , Min:99 , SDZ:405     Diastolic (99PHK), DIAZ:24, Min:47, Max:64      Intake/Output Summary (Last 24 hours) at 3/6/2023 0847  Last data filed at 3/6/2023 5952  Gross per 24 hour   Intake --   Output 1582 ml   Net -1582 ml     Weight (last 2 days) Date/Time Weight    03/06/23 0621 105 (231 26)        Telemetry Review: V paced     Physical Exam  Constitutional:       General: She is not in acute distress  Appearance: She is obese  HENT:      Head: Normocephalic  Mouth/Throat:      Mouth: Mucous membranes are moist    Cardiovascular:      Rate and Rhythm: Normal rate and regular rhythm  Pulses: Normal pulses  Heart sounds: Murmur heard  Comments: Grade II systolic murmur   Pulmonary:      Effort: Pulmonary effort is normal  No respiratory distress  Comments: Decreased in bases   1 L NC   Abdominal:      General: Bowel sounds are normal       Palpations: Abdomen is soft  Musculoskeletal:         General: No swelling  Normal range of motion  Cervical back: Neck supple  Comments: L plantar pain on exam    Skin:     General: Skin is warm and dry  Capillary Refill: Capillary refill takes less than 2 seconds  Neurological:      Mental Status: She is alert and oriented to person, place, and time        Comments: Very [place]    Psychiatric:         Mood and Affect: Mood normal          LABORATORY RESULTS      CBC with diff:   Results from last 7 days   Lab Units 03/05/23 0459 03/03/23 2213 03/03/23 1918   WBC Thousand/uL 4 84 4 87 4 60   HEMOGLOBIN g/dL 7 4* 8 0* 8 2*   HEMATOCRIT % 23 7* 25 4* 26 5*   MCV fL 100* 98 99*   PLATELETS Thousands/uL 83* 78* 79*   MCH pg 31 1 30 9 30 7   MCHC g/dL 31 2* 31 5 30 9*   RDW % 16 6* 16 3* 16 3*   MPV fL 10 6 10 5 10 3   NRBC AUTO /100 WBCs 0  --  0       CMP:  Results from last 7 days   Lab Units 03/05/23 0459 03/03/23 1918   POTASSIUM mmol/L 4 4 4 6   CHLORIDE mmol/L 102 103   CO2 mmol/L 26 25   BUN mg/dL 41* 42*   CREATININE mg/dL 1 81* 1 81*   CALCIUM mg/dL 8 9 9 0   AST U/L  --  20   ALT U/L  --  11   ALK PHOS U/L  --  58   EGFR ml/min/1 73sq m 26 26       BMP:  Results from last 7 days   Lab Units 03/05/23 0459 03/03/23 1918   POTASSIUM mmol/L 4 4 4 6   CHLORIDE mmol/L 102 103   CO2 mmol/L 26 25   BUN mg/dL 41* 42*   CREATININE mg/dL 1 81* 1 81*   CALCIUM mg/dL 8 9 9 0       No results found for: NTBNP                 Results from last 7 days   Lab Units 03/03/23  1918   HEMOGLOBIN A1C % 5 9*              Results from last 7 days   Lab Units 03/03/23  2213   INR  1 34*       Lipid Profile:   No results found for: CHOL  Lab Results   Component Value Date    HDL 24 (L) 03/03/2023     Lab Results   Component Value Date    LDLCALC 32 03/03/2023     Lab Results   Component Value Date    TRIG 257 (H) 03/03/2023       Cardiac testing:   No results found for this or any previous visit  No results found for this or any previous visit  No results found for this or any previous visit  No valid procedures specified  No results found for this or any previous visit      Meds/Allergies   all current active meds have been reviewed and current meds:   Current Facility-Administered Medications   Medication Dose Route Frequency   • acetaminophen (TYLENOL) tablet 650 mg  650 mg Oral Q4H PRN   • ascorbic acid (VITAMIN C) tablet 500 mg  500 mg Oral Daily   • aspirin (ECOTRIN LOW STRENGTH) EC tablet 81 mg  81 mg Oral Daily   • cholecalciferol (VITAMIN D3) tablet 1,000 Units  1,000 Units Oral Daily   • escitalopram (LEXAPRO) tablet 10 mg  10 mg Oral Daily   • ferrous sulfate tablet 325 mg  325 mg Oral Daily   • furosemide (LASIX) injection 40 mg  40 mg Intravenous BID (diuretic)   • gabapentin (NEURONTIN) capsule 300 mg  300 mg Oral BID   • glycerin-hypromellose- (ARTIFICIAL TEARS) ophthalmic solution 1 drop  1 drop Both Eyes Q3H PRN   • heparin (porcine) subcutaneous injection 5,000 Units  5,000 Units Subcutaneous Q8H [address]   • insulin glargine (LANTUS) subcutaneous injection 38 Units 0 38 mL  38 Units Subcutaneous Q12H [address]   • insulin lispro (HumaLOG) 100 units/mL subcutaneous injection 52 Units  52 Units Subcutaneous TID With Meals   • melatonin tablet 3 mg  3 mg Oral HS   • metoprolol tartrate (LOPRESSOR) tablet 75 mg  75 mg Oral Q12H [address]   • nitroglycerin (NITROSTAT) SL tablet 0 4 mg  0 4 mg Sublingual Q5 Min PRN   • ondansetron (ZOFRAN) injection 4 mg  4 mg Intravenous Q6H PRN   • pantoprazole (PROTONIX) EC tablet 40 mg  40 mg Oral Early Morning   • polyethylene glycol (MIRALAX) packet 17 g  17 g Oral Daily PRN   • spironolactone (ALDACTONE) tablet 25 mg  25 mg Oral Daily   • tamsulosin (FLOMAX) capsule 0 8 mg  0 8 mg Oral Daily     Medications Prior to Admission   Medication   • acetaminophen (TYLENOL) 325 mg tablet   • albuterol (2 5 mg/3 mL) 0 083 % nebulizer solution   • ascorbic acid (VITAMIN C) 500 MG tablet   • aspirin (ECOTRIN LOW STRENGTH) 81 mg EC tablet   • bisacodyl (DULCOLAX) 5 mg EC tablet   • cholecalciferol (VITAMIN D3) 1,000 units tablet   • dextran 70-hypromellose (Artificial Tears) 0 1-0 3 % ophthalmic solution   • diphenhydrAMINE (BENADRYL) 12 5 mg/5 mL oral liquid   • docusate sodium (COLACE) 100 mg capsule   • DropSafe Safety Pen Needles 31G X 6 MM MISC   • emollient (BIAFINE) cream   • escitalopram (LEXAPRO) 10 mg tablet   • famciclovir (FAMVIR) 500 mg tablet   • ferrous sulfate 325 (65 Fe) mg tablet   • furosemide (LASIX) 40 mg tablet   • gabapentin (NEURONTIN) 300 mg capsule   • gabapentin (NEURONTIN) 600 MG tablet   • glucagon 1 MG injection   • guaiFENesin (ROBITUSSIN) 100 MG/5ML oral liquid   • HumaLOG KwikPen 100 units/mL injection pen   • hydrALAZINE (APRESOLINE) 25 mg tablet   • insulin aspart (NovoLOG) 100 units/mL injection   • insulin aspart, w/niacinamide, (FIASP) 100 Units/mL injection pen   • insulin glargine (LANTUS SOLOSTAR) 100 units/mL injection pen   • linaGLIPtin 5 MG TABS   • lisinopril (ZESTRIL) 10 mg tablet   • lisinopril (ZESTRIL) 20 mg tablet   • lisinopril (ZESTRIL) 30 mg tablet   • lisinopril (ZESTRIL) 40 mg tablet   • loperamide (IMODIUM) 2 mg capsule   • meclizine (ANTIVERT) 25 mg tablet   • Melatonin 3 MG CAPS   • melatonin 3 mg   • Menthol-Methyl Salicylate ([employer] MUSCLE RUB) 10-15 % CREA   • metFORMIN (GLUCOPHAGE) 1000 MG tablet   • metoprolol tartrate (LOPRESSOR) 50 mg tablet   • Metoprolol Tartrate 75 MG TABS   • NovoLOG FlexPen 100 units/mL injection pen   • nystatin (MYCOSTATIN) ointment   • nystatin (MYCOSTATIN) powder   • omeprazole (PriLOSEC) 40 MG capsule   • pantoprazole (PROTONIX) 40 mg tablet   • polyethylene glycol (GOLYTELY) 4000 mL solution   • polyethylene glycol (MIRALAX) 17 g packet   • polyvinyl alcohol (LIQUIFILM TEARS) 1 4 % ophthalmic solution   • Senna S 8 6-50 MG per tablet   • silver sulfadiazine (SILVADENE,SSD) 1 % cream   • spironolactone (ALDACTONE) 25 mg tablet   • tamsulosin (FLOMAX) 0 4 mg     Counseling / Coordination of Care  Total floor / unit time spent today 20 minutes  Greater than 50% of total time was spent with the patient and / or family counseling and / or coordination of care  ** Please Note: Dragon 360 Dictation voice to text software may have been used in the creation of this document   **

## 2023-03-06 NOTE — ASSESSMENT & PLAN NOTE
Lab Results   Component Value Date    HGBA1C 5 9 (H) 03/03/2023       Recent Labs     03/05/23  1550 03/05/23 2038 03/05/23 2127 03/06/23  0759   POCGLU 205* 166* 164* 172*       Blood Sugar Average: Last 72 hrs:  · (P) 173 [id number]   · well controlled per most recent hgb a1c  · Continue home insulin regimen  · Sliding scale initiated

## 2023-03-06 NOTE — ASSESSMENT & PLAN NOTE
· Patient with acute episode of near syncope while showering with left-sided chest pain and shortness of breath  EKG showed left bundle branch block  2-hour delta troponin 3  Maintained on telemetry  Heparin drip initiated however discontinued due to hemoptysis and age-adjusted D-dimer  Continue on aspirin and statin and nitro  · Cardiology consult and support appreciated, continue to treat for congestive heart failure likely due to severe aortic stenosis  · We will give another round of IV Lasix 40 mg today and transition to p o   Lasix, 60 mg daily tomorrow

## 2023-03-06 NOTE — CASE MANAGEMENT
Case Management Assessment & Discharge Planning Note    Patient name Awa Dahl  Location S /S -01 MRN [id number]  : 1945 Date 3/6/2023       Current Admission Date: 3/3/2023  Current Admission Diagnosis:Acute diastolic congestive heart failure [facility])   Patient Active Problem List    Diagnosis Date Noted   • Hepatic lesion 2023   • Hypoxia 2023   • Acute diastolic congestive heart failure ([place] [address] ) 2023   • Elevated troponin 2023   • Thrombocytopenia, unspecified ([place] [address] ) 2021   • Anemia 2021   • AVM (arteriovenous malformation) of colon 2021   • Gastritis without bleeding 2021   • Vulvar candidiasis 2021   • Intertrigo 2021   • Panniculitis 2021   • Morbid obesity with BMI of 40 0-44 9, adult ([place] [address] ) 2020   • Hematochezia 2020   • NEHEMIAS (acute kidney injury) ([place] [address] ) 2020   • Traumatic head injury with multiple lacerations 10/02/2020   • Fall 10/02/2020   • Chronic edema 2020   • Iron deficiency anemia 2020   • Heart block, AV 2020   • History of cardiac pacemaker 2020   • Recurrent major depressive disorder ([place] [address] ) 2020   • Vitamin D deficiency 2020   • History of CVA (cerebrovascular accident) 2020   • Neuropathy 2020   • Pancytopenia ([place] [address] ) 2020   • Hepatic cirrhosis ([place] [address] ) 2020   • History of GI bleed 2020   • Urinary retention 2020   • Primary insomnia 2020   • Dry eyes, bilateral 2020   • Herpes zoster infection 2020   • Absence of both cervix and uterus, acquired 2019   • Nonrheumatic aortic (valve) stenosis 2019   • Presence of left artificial ankle joint 2019   • Presence of right artificial knee joint 2019   • Neck pain 2019   • Cervical spondylosis without myelopathy 2019   • Occlusion and stenosis of unspecified carotid artery 2018   • Other fecal abnormalities 2018   • Overflow incontinence 12/06/2017   • Pure hypercholesterolemia 07/06/2017   • Anxiety disorder, unspecified 05/12/2017   • Benign neoplasm of brain, unspecified ([facility] [address] ) 05/12/2017   • Constipation, unspecified 05/12/2017   • Dysarthria following unspecified cerebrovascular disease 05/12/2017   • Hemiplegia and hemiparesis following unspecified cerebrovascular disease affecting right dominant side ([address] ) 05/12/2017   • Long term (current) use of insulin ([address] ) 05/12/2017   • Mixed hyperlipidemia 05/12/2017   • Other chronic pain 05/12/2017   • Other symptoms and signs involving cognitive functions following unspecified cerebrovascular disease 05/12/2017   • Polyneuropathy, unspecified 05/12/2017   • Type 2 diabetes mellitus with diabetic neuropathy, unspecified ([facility] [address] ) 05/12/2017   • Unspecified right bundle-branch block 05/12/2017   • Dysphagia 03/23/2017   • Mitral valve stenosis 03/23/2017   • Cerebrovascular accident (CVA) due to thrombosis of left middle cerebral artery ([facility] [address] ) 03/21/2017   • Hemiparesis, right ([address] ) 03/21/2017   • Type 2 diabetes mellitus ([address] ) 10/21/2013   • Hypertension 10/21/2013   • Hearing loss 10/21/2013   • Arthritis 10/21/2013   • Benign neoplasm of pituitary gland ([facility] [address] ) 10/02/2013      LOS (days): 2  Geometric Mean LOS (GMLOS) (days):   Days to GMLOS:     OBJECTIVE:    Risk of Unplanned Readmission Score: 25 14         Current admission status: Inpatient       Preferred Pharmacy:   [facility] [address] [facility]  [address] [address]  Phone: [phone] Fax: [address] [place] [address], [address] [place] [address]  Phone: [phone] Fax: [phone]    Primary Care Provider: Araceli Oh MD    Primary Insurance: [facility]  Secondary Insurance: [address] [place] [address] Court:  [address] Proxies    There are no active Health Care Proxies on file                       Patient Information  Admitted from[de-identified] Facility Terri Raines)  Mental Status: Other (Comment) (patient sleeping)  During Assessment patient was accompanied by: Not accompanied during assessment  Assessment information provided by[de-identified] Other - please comment (Minoo parada)  Support Systems: Family members  [place] of Residence: [address] [facility] do you live in?: [address] [facility] entry access options   Select all that apply : No steps to enter home  Type of Current Residence: Facility ([facility])  Upon entering residence, is there a bedroom on the main floor (no further steps)?: Yes  Upon entering residence, is there a bathroom on the main floor (no further steps)?: Yes  In the last 12 months, was there a time when you were not able to pay the mortgage or rent on time?: No  In the last 12 months, how many places have you lived?: 1  In the last 12 months, was there a time when you did not have a steady place to sleep or slept in a shelter (including now)?: No  Living Arrangements: Other (Comment) ([address] [facility] facility)    Activities of Daily Living Prior to Admission  Functional Status: Assistance  Completes ADLs independently?: No  Level of ADL dependence: Assistance  Ambulates independently?: No  Level of ambulatory dependence: Assistance         Patient Information Continued  Does patient have prescription coverage?: Yes  Within the past 12 months, you worried that your food would run out before you got the money to buy more : Never true  Within the past 12 months, the food you bought just didn't last and you didn't have money to get more : Never true  Food insecurity resource given?: N/A  Does patient receive dialysis treatments?: No  Does patient have a history of substance abuse?: No         Means of Transportation  Means of Transport to [facility][de-identified] Other (Comment) (facility transports)  In the past 12 months, has lack of transportation kept you from medical appointments or from getting medications?: No  In the past 12 months, has lack of transportation kept you from meetings, work, or from getting things needed for daily living?: No  Was application for public transport provided?: N/A        DISCHARGE DETAILS:    Discharge planning discussed with[de-identified] patient's talRafat, by phone  Freedom of Choice: Yes (re: facility return)  Comments - Freedom of Choice: requesting patient return to [facility] where she is a long-term resident  CM contacted family/caregiver?: Yes (Rafat parada, by phone)  Were Treatment Team discharge recommendations reviewed with patient/caregiver?: Yes  Did patient/caregiver verbalize understanding of patient care needs?: N/A- going to facility  Were patient/caregiver advised of the risks associated with not following Treatment Team discharge recommendations?: Yes    Contacts  Patient Contacts: tal Burton  Relationship to Patient[de-identified] Family  Contact Method: Phone  Reason/Outcome: Continuity of Care, Referral, Discharge Planning, Emergency [address]         Is the patient interested in [facility] at discharge?: No    DME Referral Provided  Referral made for DME?: No    Other Referral/Resources/Interventions Provided:  Interventions: SNF  Referral Comments: Patient admitted due to acute diastolic congestive heart failure  Attempted to meet with patient to complete assessment, but patient fast asleep  Call made to Rafat parada, who reports patient is a long-term resident at [facility]  Confirmed plan would be for patient to return at d/c, and in agreement with referral to be sent  Referral sent to [facility] in [address]; confirmation received that patient able to return once medically stable - will need COVID test during hospitalization - SLIM PA aware of same and to order      Would you like to participate in our [facility] service program?  : No - Declined    Treatment Team Recommendation: SNF  Discharge Destination Plan[de-identified] SNF [facility] Accepting Facility Name, [address] : [address] [place]  Receiving Facility/Agency Phone Number: [phone]  Facility/Agency Fax Number: [phone]

## 2023-03-06 NOTE — PROGRESS NOTES
[facility]  Progress Note Aidee Simmons 1945, 68 y o  female MRN: [id number]  Unit/Bed#: S -01 Encounter: [id number]  Primary Care Provider: Madonna Garcia MD   Date and time admitted to hospital: 3/3/2023  7:01 PM    * Acute diastolic congestive heart failure ([address] )  Assessment & Plan  · Patient with acute episode of near syncope while showering with left-sided chest pain and shortness of breath  EKG showed left bundle branch block  2-hour delta troponin 3  Maintained on telemetry  Heparin drip initiated however discontinued due to hemoptysis and age-adjusted D-dimer  Continue on aspirin and statin and nitro  · Cardiology consult and support appreciated, continue to treat for congestive heart failure likely due to severe aortic stenosis  · We will give another round of IV Lasix 40 mg today and transition to p o  Lasix, 60 mg daily tomorrow    Hepatic lesion  Assessment & Plan  Reported on her CAT scan  Noted to have multiple ultrasounds as late as 2021 without suspicious hepatic masses  · Will require further imaging as outpatient to include either triple phase CT or MRI    Hypoxia  Assessment & Plan  Presented to the emergency department with chest pressure, shortness of breath and hypoxia  · CAT scan obtained which showed pulmonary edema  · D-dimer age adjusted as normal, heparin gtt discontinued due to improving hypoxia with diuresis and risk of hemoptysis  · Noted to have severe aortic stenosis  · Cardiology consultation appreciated and felt to be consistent with congestive heart failure, improving with diuresis  She is currently requiring 1 5 L    Elevated troponin  Assessment & Plan  · Likely secondary to CHF exacerbation  · No invasive work-up per cardiology  Nonrheumatic aortic (valve) stenosis  Assessment & Plan  · Severe stenosis per recent echocardiogram follow-ups with cardiology with no surgical intervention recommended due to poor candidacy  Continue medical management and monitoring of volume status    History of CVA (cerebrovascular accident)  Assessment & Plan  · Continue supportive care    Hypertension  Assessment & Plan  · Pressures acceptable on regimen of spironolactone, Lopressor  Currently on IV diuresis    Type 2 diabetes mellitus [facility])  Assessment & Plan  Lab Results   Component Value Date    HGBA1C 5 9 (H) 03/03/2023       Recent Labs     03/05/23  1550 03/05/23  2038 03/05/23  2127 03/06/23  0759   POCGLU 205* 166* 164* 172*       Blood Sugar Average: Last 72 hrs:  · (P) 173 [id number]   · well controlled per most recent hgb a1c  · Continue home insulin regimen  · Sliding scale initiated    History of cardiac pacemaker  Assessment & Plan  · History of AV block    Iron deficiency anemia  Assessment & Plan  · Hemoglobin 8 at baseline with a history of AVM and GI bleed  Continue to trend      VTE Pharmacologic Prophylaxis: VTE Score: 5 High Risk (Score >/= 5) - Pharmacological DVT Prophylaxis Ordered: enoxaparin (Lovenox)  Sequential Compression Devices Ordered  Patient Centered Rounds: I performed bedside rounds with nursing staff today  Discussions with Specialists or Other Care Team Provider: олег, rn    Education and Discussions with Family / Patient: Updated  (son and daughter) at bedside  Time Spent for Care: 45 minutes  More than 50% of total time spent on counseling and coordination of care as described above  Current Length of Stay: 2 day(s)  Current Patient Status: Inpatient   Certification Statement: The patient will continue to require additional inpatient hospital stay due to IV diuresis for CHF exacerbation  Discharge Plan: Anticipate discharge tomorrow to prior assisted or independent living facility  Code Status: Level 3 - DNAR and DNI    Subjective:   Feeling better today  Does not report shortness of breath presently  Reports acute foot pain as well as constipation overnight    She has not been ambulating  No falls     Objective:     Vitals:   Temp (24hrs), Av 2 °F (36 8 °C), Min:98 1 °F (36 7 °C), Max:98 4 °F (36 9 °C)    Temp:  [98 1 °F (36 7 °C)-98 4 °F (36 9 °C)] 98 2 °F (36 8 °C)  HR:  [69-90] 79  Resp:  [16-18] 16  BP: ()/(47-64) 122/63  SpO2:  [90 %-97 %] 93 %  Body mass index is 42 3 kg/m²  Input and Output Summary (last 24 hours): Intake/Output Summary (Last 24 hours) at 3/6/2023 1000  Last data filed at 3/6/2023 6077  Gross per 24 hour   Intake --   Output 1582 ml   Net -1582 ml       Physical Exam:   Physical Exam  Vitals and nursing note reviewed  Constitutional:       General: She is not in acute distress  Appearance: Normal appearance  Interventions: Nasal cannula in place  Comments: YUAN   HENT:      Head: Normocephalic  Mouth/Throat:      Mouth: Mucous membranes are moist    Eyes:      General: No scleral icterus  Pupils: Pupils are equal, round, and reactive to light  Cardiovascular:      Rate and Rhythm: Normal rate and regular rhythm  Heart sounds: No murmur heard  Pulmonary:      Effort: Pulmonary effort is normal  No respiratory distress  Breath sounds: Normal breath sounds  No wheezing, rhonchi or rales  Abdominal:      General: Bowel sounds are normal  There is no distension  Palpations: Abdomen is soft  Tenderness: There is no abdominal tenderness  Musculoskeletal:         General: No swelling  Right lower leg: No edema  Left lower leg: No edema  Skin:     Capillary Refill: Capillary refill takes less than 2 seconds  Neurological:      General: No focal deficit present  Mental Status: She is alert and oriented to person, place, and time  Mental status is at baseline            Additional Data:     Labs:  Results from last 7 days   Lab Units 23  0459   WBC Thousand/uL 4 84   HEMOGLOBIN g/dL 7 4*   HEMATOCRIT % 23 7*   PLATELETS Thousands/uL 83*   NEUTROS PCT % 70   LYMPHS PCT % 18 MONOS PCT % 9   EOS PCT % 2     Results from last 7 days   Lab Units 03/05/23  0459 03/03/23  1918   SODIUM mmol/L 138 138   POTASSIUM mmol/L 4 4 4 6   CHLORIDE mmol/L 102 103   CO2 mmol/L 26 25   BUN mg/dL 41* 42*   CREATININE mg/dL 1 81* 1 81*   ANION GAP mmol/L 10 10   CALCIUM mg/dL 8 9 9 0   ALBUMIN g/dL  --  3 8   TOTAL BILIRUBIN mg/dL  --  0 59   ALK PHOS U/L  --  58   ALT U/L  --  11   AST U/L  --  20   GLUCOSE RANDOM mg/dL 122 152*     Results from last 7 days   Lab Units 03/03/23  2213   INR  1 34*     Results from last 7 days   Lab Units 03/06/23  0759 03/05/23  2127 03/05/23  2038 03/05/23  1550 03/05/23  1114 03/05/23  0729 03/04/23  2101 03/04/23  1642 03/04/23  1219 03/04/23  0815 03/04/23  0240   POC GLUCOSE mg/dl 172* 164* 166* 205* 100 150* 129 133 196* 268* 229*     Results from last 7 days   Lab Units 03/03/23  1918   HEMOGLOBIN A1C % 5 9*     Results from last 7 days   Lab Units 03/04/23  0556   PROCALCITONIN ng/ml 0 15       Lines/Drains:  Invasive Devices     Peripheral Intravenous Line  Duration           Peripheral IV 03/04/23 Left;Ventral (anterior) Forearm 2 days                      Imaging: No pertinent imaging reviewed      Recent Cultures (last 7 days):         Last 24 Hours Medication List:   Current Facility-Administered Medications   Medication Dose Route Frequency Provider Last Rate   • acetaminophen  650 mg Oral Q4H PRN Quiana Lopez PA-C     • ascorbic acid  500 mg Oral Daily Kiara Lopez PA-C     • aspirin  81 mg Oral Daily Kiara Lopez PA-C     • cholecalciferol  1,000 Units Oral Daily Kiara Lopez PA-C     • escitalopram  10 mg Oral Daily Kiara Lopez PA-C     • ferrous sulfate  325 mg Oral Daily Quiana Lopez PA-C     • [START ON 3/7/2023] furosemide  60 mg Oral Daily Gaylia Levo, CRNP     • gabapentin  300 mg Oral BID Kiara Lopez PA-C     • glycerin-hypromellose-  1 drop Both Eyes Q3H PRN Mauricio Lopez PA-C     • heparin (porcine)  5,000 Units Subcutaneous [facility] Lindsey Juan MD     • insulin glargine  38 Units Subcutaneous Q12H [id number] [place] [address] [address] Jessica PA-C     • insulin lispro  52 Units Subcutaneous TID With Meals Mauricio Lopez PA-C     • melatonin  3 mg Oral HS Kiara Lopez PA-C     • metoprolol tartrate  75 mg Oral Q12H [facility] Kiara Lopez PA-C     • nitroglycerin  0 4 mg Sublingual Q5 Min PRN Kiara Lopez PA-C     • ondansetron  4 mg Intravenous Q6H PRN Mauricio Lopez PA-C     • pantoprazole  40 mg Oral Early Morning Kiara Lopez PA-C     • polyethylene glycol  17 g Oral Daily PRN Mauricio Lopez PA-C     • senna-docusate sodium  1 tablet Oral BID Leila Padilla PA-C     • spironolactone  25 mg Oral Daily Kiara Lopez PA-C     • tamsulosin  0 8 mg Oral Daily Kiara Lopez PA-C          Today, Patient Was Seen By: Laverne Campos PA-C    **Please Note: This note may have been constructed using a voice recognition system  **

## 2023-03-06 NOTE — ASSESSMENT & PLAN NOTE
Reported on her CAT scan  Noted to have multiple ultrasounds as late as 2021 without suspicious hepatic masses    · Will require further imaging as outpatient to include either triple phase CT or MRI

## 2023-03-06 NOTE — PLAN OF CARE
Problem: MOBILITY - ADULT  Goal: Maintain or return to baseline ADL function  Description: INTERVENTIONS:  -  Assess patient's ability to carry out ADLs; assess patient's baseline for ADL function and identify physical deficits which impact ability to perform ADLs (bathing, care of mouth/teeth, toileting, grooming, dressing, etc )  - Assess/evaluate cause of self-care deficits   - Assess range of motion  - Assess patient's mobility; develop plan if impaired  - Assess patient's need for assistive devices and provide as appropriate  - Encourage maximum independence but intervene and supervise when necessary  - Involve family in performance of ADLs  - Assess for home care needs following discharge   - Consider OT consult to assist with ADL evaluation and planning for discharge  - Provide patient education as appropriate  Outcome: Progressing  Goal: Maintains/Returns to pre admission functional level  Description: INTERVENTIONS:  - Perform BMAT or MOVE assessment daily    - Set and communicate daily mobility goal to care team and patient/family/caregiver  - Collaborate with rehabilitation services on mobility goals if consulted  - Perform Range of Motion  times a day  - Reposition patient every  hours    - Dangle patient  times a day  - Stand patient  times a day  - Ambulate patient  times a day  - Out of bed to chair  times a day   - Out of bed for nayan times a day  - Out of bed for toileting  - Record patient progress and toleration of activity level   Outcome: Progressing     Problem: Prexisting or High Potential for Compromised Skin Integrity  Goal: Skin integrity is maintained or improved  Description: INTERVENTIONS:  - Identify patients at risk for skin breakdown  - Assess and monitor skin integrity  - Assess and monitor nutrition and hydration status  - Monitor labs   - Assess for incontinence   - Turn and reposition patient  - Assist with mobility/ambulation  - Relieve pressure over bony prominences  - Avoid friction and shearing  - Provide appropriate hygiene as needed including keeping skin clean and dry  - Evaluate need for skin moisturizer/barrier cream  - Collaborate with interdisciplinary team   - Patient/family teaching  - Consider wound care consult   Outcome: Progressing     Problem: Nutrition/Hydration-ADULT  Goal: Nutrient/Hydration intake appropriate for improving, restoring or maintaining nutritional needs  Description: Monitor and assess patient's nutrition/hydration status for malnutrition  Collaborate with interdisciplinary team and initiate plan and interventions as ordered  Monitor patient's weight and dietary intake as ordered or per policy  Utilize nutrition screening tool and intervene as necessary  Determine patient's food preferences and provide high-protein, high-caloric foods as appropriate       INTERVENTIONS:  - Monitor oral intake, urinary output, labs, and treatment plans  - Assess nutrition and hydration status and recommend course of action  - Evaluate amount of meals eaten  - Assist patient with eating if necessary   - Allow adequate time for meals  - Recommend/ encourage appropriate diets, oral nutritional supplements, and vitamin/mineral supplements  - Order, calculate, and assess calorie counts as needed  - Recommend, monitor, and adjust tube feedings and TPN/PPN based on assessed needs  - Assess need for intravenous fluids  - Provide specific nutrition/hydration education as appropriate  - Include patient/family/caregiver in decisions related to nutrition  Outcome: Progressing

## 2023-03-07 VITALS
BODY MASS INDEX: 41.01 KG/M2 | DIASTOLIC BLOOD PRESSURE: 62 MMHG | WEIGHT: 224.21 LBS | RESPIRATION RATE: 18 BRPM | TEMPERATURE: 98.5 F | OXYGEN SATURATION: 96 % | HEART RATE: 70 BPM | SYSTOLIC BLOOD PRESSURE: 112 MMHG

## 2023-03-07 LAB
ANION GAP SERPL CALCULATED.3IONS-SCNC: 10 MMOL/L (ref 4–13)
BASOPHILS # BLD AUTO: 0.03 THOUSANDS/ÂΜL (ref 0–0.1)
BASOPHILS NFR BLD AUTO: 1 % (ref 0–1)
BUN SERPL-MCNC: 48 MG/DL (ref 5–25)
CALCIUM SERPL-MCNC: 9 MG/DL (ref 8.4–10.2)
CHLORIDE SERPL-SCNC: 99 MMOL/L (ref 96–108)
CO2 SERPL-SCNC: 27 MMOL/L (ref 21–32)
CREAT SERPL-MCNC: 2.02 MG/DL (ref 0.6–1.3)
EOSINOPHIL # BLD AUTO: 0.11 THOUSAND/ÂΜL (ref 0–0.61)
EOSINOPHIL NFR BLD AUTO: 2 % (ref 0–6)
ERYTHROCYTE [DISTWIDTH] IN BLOOD BY AUTOMATED COUNT: 16.5 % (ref 11.6–15.1)
GFR SERPL CREATININE-BSD FRML MDRD: 23 ML/MIN/1.73SQ M
GLUCOSE SERPL-MCNC: 195 MG/DL (ref 65–140)
GLUCOSE SERPL-MCNC: 203 MG/DL (ref 65–140)
GLUCOSE SERPL-MCNC: 212 MG/DL (ref 65–140)
GLUCOSE SERPL-MCNC: 239 MG/DL (ref 65–140)
GLUCOSE SERPL-MCNC: 421 MG/DL (ref 65–140)
HCT VFR BLD AUTO: 24.8 % (ref 34.8–46.1)
HGB BLD-MCNC: 7.5 G/DL (ref 11.5–15.4)
IMM GRANULOCYTES # BLD AUTO: 0.03 THOUSAND/UL (ref 0–0.2)
IMM GRANULOCYTES NFR BLD AUTO: 1 % (ref 0–2)
LYMPHOCYTES # BLD AUTO: 0.89 THOUSANDS/ÂΜL (ref 0.6–4.47)
LYMPHOCYTES NFR BLD AUTO: 18 % (ref 14–44)
MCH RBC QN AUTO: 30.4 PG (ref 26.8–34.3)
MCHC RBC AUTO-ENTMCNC: 30.2 G/DL (ref 31.4–37.4)
MCV RBC AUTO: 100 FL (ref 82–98)
MONOCYTES # BLD AUTO: 0.54 THOUSAND/ÂΜL (ref 0.17–1.22)
MONOCYTES NFR BLD AUTO: 11 % (ref 4–12)
NEUTROPHILS # BLD AUTO: 3.29 THOUSANDS/ÂΜL (ref 1.85–7.62)
NEUTS SEG NFR BLD AUTO: 67 % (ref 43–75)
NRBC BLD AUTO-RTO: 0 /100 WBCS
PLATELET # BLD AUTO: 79 THOUSANDS/UL (ref 149–390)
PMV BLD AUTO: 10.2 FL (ref 8.9–12.7)
POTASSIUM SERPL-SCNC: 4.1 MMOL/L (ref 3.5–5.3)
RBC # BLD AUTO: 2.47 MILLION/UL (ref 3.81–5.12)
SODIUM SERPL-SCNC: 136 MMOL/L (ref 135–147)
WBC # BLD AUTO: 4.89 THOUSAND/UL (ref 4.31–10.16)

## 2023-03-07 PROCEDURE — 99239 HOSP IP/OBS DSCHRG MGMT >30: CPT | Performed by: PHYSICIAN ASSISTANT

## 2023-03-07 PROCEDURE — 82948 REAGENT STRIP/BLOOD GLUCOSE: CPT

## 2023-03-07 PROCEDURE — 85025 COMPLETE CBC W/AUTO DIFF WBC: CPT | Performed by: NURSE PRACTITIONER

## 2023-03-07 PROCEDURE — 99232 SBSQ HOSP IP/OBS MODERATE 35: CPT | Performed by: INTERNAL MEDICINE

## 2023-03-07 PROCEDURE — 80048 BASIC METABOLIC PNL TOTAL CA: CPT | Performed by: NURSE PRACTITIONER

## 2023-03-07 RX ORDER — INSULIN LISPRO 100 [IU]/ML
1-6 INJECTION, SOLUTION INTRAVENOUS; SUBCUTANEOUS
Status: DISCONTINUED | OUTPATIENT
Start: 2023-03-07 | End: 2023-03-07 | Stop reason: HOSPADM

## 2023-03-07 RX ORDER — SPIRONOLACTONE 25 MG/1
25 TABLET ORAL DAILY
Refills: 0 | Status: SHIPPED
Start: 2023-03-08

## 2023-03-07 RX ORDER — INSULIN LISPRO 100 [IU]/ML
1-5 INJECTION, SOLUTION INTRAVENOUS; SUBCUTANEOUS
Status: DISCONTINUED | OUTPATIENT
Start: 2023-03-07 | End: 2023-03-07 | Stop reason: HOSPADM

## 2023-03-07 RX ORDER — INSULIN LISPRO 100 [IU]/ML
52 INJECTION, SOLUTION INTRAVENOUS; SUBCUTANEOUS
Refills: 0 | Status: SHIPPED
Start: 2023-03-07

## 2023-03-07 RX ORDER — INSULIN GLARGINE 100 [IU]/ML
38 INJECTION, SOLUTION SUBCUTANEOUS EVERY 12 HOURS SCHEDULED
Qty: 10 ML | Refills: 0 | Status: SHIPPED
Start: 2023-03-07

## 2023-03-07 RX ORDER — FUROSEMIDE 20 MG/1
60 TABLET ORAL DAILY
Refills: 0 | Status: SHIPPED
Start: 2023-03-08

## 2023-03-07 RX ADMIN — CHOLECALCIFEROL TAB 25 MCG (1000 UNIT) 1000 UNITS: 25 TAB at 09:35

## 2023-03-07 RX ADMIN — INSULIN LISPRO 3 UNITS: 100 INJECTION, SOLUTION INTRAVENOUS; SUBCUTANEOUS at 12:15

## 2023-03-07 RX ADMIN — ACETAMINOPHEN 650 MG: 325 TABLET ORAL at 03:47

## 2023-03-07 RX ADMIN — INSULIN GLARGINE 38 UNITS: 100 INJECTION, SOLUTION SUBCUTANEOUS at 10:38

## 2023-03-07 RX ADMIN — HEPARIN SODIUM 5000 UNITS: 5000 INJECTION INTRAVENOUS; SUBCUTANEOUS at 05:01

## 2023-03-07 RX ADMIN — OXYCODONE HYDROCHLORIDE AND ACETAMINOPHEN 500 MG: 500 TABLET ORAL at 09:34

## 2023-03-07 RX ADMIN — ESCITALOPRAM 10 MG: 10 TABLET, FILM COATED ORAL at 09:34

## 2023-03-07 RX ADMIN — INSULIN LISPRO 52 UNITS: 100 INJECTION, SOLUTION INTRAVENOUS; SUBCUTANEOUS at 12:14

## 2023-03-07 RX ADMIN — GABAPENTIN 300 MG: 300 CAPSULE ORAL at 09:34

## 2023-03-07 RX ADMIN — TAMSULOSIN HYDROCHLORIDE 0.8 MG: 0.4 CAPSULE ORAL at 09:34

## 2023-03-07 RX ADMIN — INSULIN LISPRO 52 UNITS: 100 INJECTION, SOLUTION INTRAVENOUS; SUBCUTANEOUS at 09:35

## 2023-03-07 RX ADMIN — ASPIRIN 81 MG: 81 TABLET, COATED ORAL at 09:34

## 2023-03-07 RX ADMIN — INSULIN LISPRO 6 UNITS: 100 INJECTION, SOLUTION INTRAVENOUS; SUBCUTANEOUS at 05:01

## 2023-03-07 RX ADMIN — METOPROLOL TARTRATE 75 MG: 50 TABLET, FILM COATED ORAL at 09:34

## 2023-03-07 RX ADMIN — PANTOPRAZOLE SODIUM 40 MG: 40 TABLET, DELAYED RELEASE ORAL at 05:01

## 2023-03-07 RX ADMIN — SPIRONOLACTONE 25 MG: 25 TABLET ORAL at 09:34

## 2023-03-07 RX ADMIN — FERROUS SULFATE TAB 325 MG (65 MG ELEMENTAL FE) 325 MG: 325 (65 FE) TAB at 09:34

## 2023-03-07 RX ADMIN — SENNOSIDES AND DOCUSATE SODIUM 1 TABLET: 8.6; 5 TABLET ORAL at 09:34

## 2023-03-07 RX ADMIN — FUROSEMIDE 60 MG: 40 TABLET ORAL at 09:34

## 2023-03-07 NOTE — PROGRESS NOTES
General Cardiology   Progress Note -  Team One   Juanita Luu 68 y o  female MRN: [id number]    Unit/Bed#: S -01 Encounter: [id number]    Assessment/ Plan    1  Acute on chronic diastolic heart failure  Received IV diuretics during admission with: furosemide 40 mg IV BID and spirolactone 25 mg PO daily   She takes furosemide 40 mg PO daily at home  Started on furosemide 60 mg PO daily and continued spirolactone 25 mg PO daily   I/Os inaccurate  No input documented   Daily weights: 224 lbs  Weight in office 227 lbs (1/9/2023)   On 2 gram Na diet and 2000 ml fluid restriction      2  Aortic stenosis   Follows with Dr Sherrie Piedra at [facility]   Patient declined surgical intervention      3  Hypertension   Average /52   On metoprolol 75 mg PO BID      4  Complete heart block s/p PPM      5  Type II diabetes   Hemoglobin A1C 5 9  On lantus and humalog     Recommend following up with Dr Sherrie Piedra within 1 week of discharge  Patient stable from cardiac standpoint for discharge  Subjective  Patient reports no SOB, chest pain or palpitations  No fever or chills  She reports her feet hurt her  Review of Systems   Constitutional: Negative for chills and fever  HENT: Negative for congestion  Cardiovascular: Negative for chest pain, dyspnea on exertion, leg swelling, orthopnea and palpitations  Respiratory: Negative for shortness of breath  Musculoskeletal: Negative for falls  Bilateral feet pain    Gastrointestinal: Negative for bloating, nausea and vomiting  Neurological: Negative for dizziness and light-headedness  Psychiatric/Behavioral: Negative for altered mental status  All other systems reviewed and are negative  Objective:   Vitals: Blood pressure 112/62, pulse 70, temperature 98 5 °F (36 9 °C), resp  rate 18, weight 102 kg (224 lb 3 3 oz), SpO2 96 %  ,       Body mass index is 41 01 kg/m²  ,     Systolic (23OOZ), RID:640 , Min:100 , OCW:294     Diastolic (88QZP), RKE:62, Min:44, Max:62        No intake or output data in the 24 hours ending 03/07/23 0909  Weight (last 2 days)     Date/Time Weight    03/07/23 0600 102 (224 21)    03/06/23 0621 105 (231 26)          Physical Exam  Constitutional:       Appearance: She is obese  HENT:      Head: Normocephalic  Mouth/Throat:      Mouth: Mucous membranes are moist    Cardiovascular:      Rate and Rhythm: Normal rate and regular rhythm  Pulses: Normal pulses  Heart sounds: Murmur heard  Comments: Grade III systolic murmur   Pulmonary:      Effort: Pulmonary effort is normal  No respiratory distress  Breath sounds: Normal breath sounds  Abdominal:      General: Bowel sounds are normal       Palpations: Abdomen is soft  Musculoskeletal:         General: No swelling  Normal range of motion  Skin:     General: Skin is warm and dry  Capillary Refill: Capillary refill takes less than 2 seconds  Neurological:      Mental Status: She is alert and oriented to person, place, and time        Comments: Very [place]    Psychiatric:         Mood and Affect: Mood normal          LABORATORY RESULTS      CBC with diff:   Results from last 7 days   Lab Units 03/07/23  0523 03/05/23  0459 03/03/23  2213 03/03/23  1918   WBC Thousand/uL 4 89 4 84 4 87 4 60   HEMOGLOBIN g/dL 7 5* 7 4* 8 0* 8 2*   HEMATOCRIT % 24 8* 23 7* 25 4* 26 5*   MCV fL 100* 100* 98 99*   PLATELETS Thousands/uL 79* 83* 78* 79*   MCH pg 30 4 31 1 30 9 30 7   MCHC g/dL 30 2* 31 2* 31 5 30 9*   RDW % 16 5* 16 6* 16 3* 16 3*   MPV fL 10 2 10 6 10 5 10 3   NRBC AUTO /100 WBCs 0 0  --  0       CMP:  Results from last 7 days   Lab Units 03/07/23  0523 03/06/23  1000 03/05/23  0459 03/03/23  1918   POTASSIUM mmol/L 4 1 4 1 4 4 4 6   CHLORIDE mmol/L 99 99 102 103   CO2 mmol/L 27 28 26 25   BUN mg/dL 48* 42* 41* 42*   CREATININE mg/dL 2 02* 1 92* 1 81* 1 81*   CALCIUM mg/dL 9 0 9 0 8 9 9 0   AST U/L  --   --   --  20   ALT U/L  --   --   --  11   ALK PHOS U/L  --   -- --  58   EGFR ml/min/1 73sq m 23 24 26 26       BMP:  Results from last 7 days   Lab Units 03/07/23  0523 03/06/23  1000 03/05/23  0459 03/03/23  1918   POTASSIUM mmol/L 4 1 4 1 4 4 4 6   CHLORIDE mmol/L 99 99 102 103   CO2 mmol/L 27 28 26 25   BUN mg/dL 48* 42* 41* 42*   CREATININE mg/dL 2 02* 1 92* 1 81* 1 81*   CALCIUM mg/dL 9 0 9 0 8 9 9 0        Results from last 7 days   Lab Units 03/03/23  1918   HEMOGLOBIN A1C % 5 9*     Results from last 7 days   Lab Units 03/03/23  2213   INR  1 34*       Lipid Profile:   No results found for: CHOL  Lab Results   Component Value Date    HDL 24 (L) 03/03/2023     Lab Results   Component Value Date    LDLCALC 32 03/03/2023     Lab Results   Component Value Date    TRIG 257 (H) 03/03/2023       Cardiac testing:   No results found for this or any previous visit  No results found for this or any previous visit  No results found for this or any previous visit  No valid procedures specified  No results found for this or any previous visit      Meds/Allergies   all current active meds have been reviewed and current meds:   Current Facility-Administered Medications   Medication Dose Route Frequency   • acetaminophen (TYLENOL) tablet 650 mg  650 mg Oral Q4H PRN   • ascorbic acid (VITAMIN C) tablet 500 mg  500 mg Oral Daily   • aspirin (ECOTRIN LOW STRENGTH) EC tablet 81 mg  81 mg Oral Daily   • cholecalciferol (VITAMIN D3) tablet 1,000 Units  1,000 Units Oral Daily   • escitalopram (LEXAPRO) tablet 10 mg  10 mg Oral Daily   • ferrous sulfate tablet 325 mg  325 mg Oral Daily   • furosemide (LASIX) tablet 60 mg  60 mg Oral Daily   • gabapentin (NEURONTIN) capsule 300 mg  300 mg Oral BID   • glycerin-hypromellose- (ARTIFICIAL TEARS) ophthalmic solution 1 drop  1 drop Both Eyes Q3H PRN   • heparin (porcine) subcutaneous injection 5,000 Units  5,000 Units Subcutaneous Q8H [address]   • insulin glargine (LANTUS) subcutaneous injection 38 Units 0 38 mL  38 Units Subcutaneous Q12H [address]   • insulin lispro (HumaLOG) 100 units/mL subcutaneous injection 1-5 Units  1-5 Units Subcutaneous HS   • insulin lispro (HumaLOG) 100 units/mL subcutaneous injection 1-6 Units  1-6 Units Subcutaneous TID AC   • insulin lispro (HumaLOG) 100 units/mL subcutaneous injection 52 Units  52 Units Subcutaneous TID With Meals   • melatonin tablet 3 mg  3 mg Oral HS   • metoprolol tartrate (LOPRESSOR) tablet 75 mg  75 mg Oral Q12H [address]   • nitroglycerin (NITROSTAT) SL tablet 0 4 mg  0 4 mg Sublingual Q5 Min PRN   • ondansetron (ZOFRAN) injection 4 mg  4 mg Intravenous Q6H PRN   • pantoprazole (PROTONIX) EC tablet 40 mg  40 mg Oral Early Morning   • polyethylene glycol (MIRALAX) packet 17 g  17 g Oral Daily PRN   • senna-docusate sodium (SENOKOT S) 8 6-50 mg per tablet 1 tablet  1 tablet Oral BID   • spironolactone (ALDACTONE) tablet 25 mg  25 mg Oral Daily   • tamsulosin (FLOMAX) capsule 0 8 mg  0 8 mg Oral Daily     Medications Prior to Admission   Medication   • acetaminophen (TYLENOL) 325 mg tablet   • albuterol (2 5 mg/3 mL) 0 083 % nebulizer solution   • ascorbic acid (VITAMIN C) 500 MG tablet   • aspirin (ECOTRIN LOW STRENGTH) 81 mg EC tablet   • bisacodyl (DULCOLAX) 5 mg EC tablet   • cholecalciferol (VITAMIN D3) 1,000 units tablet   • dextran 70-hypromellose (Artificial Tears) 0 1-0 3 % ophthalmic solution   • diphenhydrAMINE (BENADRYL) 12 5 mg/5 mL oral liquid   • docusate sodium (COLACE) 100 mg capsule   • DropSafe Safety Pen Needles 31G X 6 MM MISC   • emollient (BIAFINE) cream   • escitalopram (LEXAPRO) 10 mg tablet   • famciclovir (FAMVIR) 500 mg tablet   • ferrous sulfate 325 (65 Fe) mg tablet   • furosemide (LASIX) 40 mg tablet   • gabapentin (NEURONTIN) 300 mg capsule   • gabapentin (NEURONTIN) 600 MG tablet   • glucagon 1 MG injection   • guaiFENesin (ROBITUSSIN) 100 MG/5ML oral liquid   • HumaLOG KwikPen 100 units/mL injection pen   • hydrALAZINE (APRESOLINE) 25 mg tablet   • insulin aspart (NovoLOG) 100 units/mL injection   • insulin aspart, w/niacinamide, (FIASP) 100 Units/mL injection pen   • insulin glargine (LANTUS SOLOSTAR) 100 units/mL injection pen   • linaGLIPtin 5 MG TABS   • lisinopril (ZESTRIL) 10 mg tablet   • lisinopril (ZESTRIL) 20 mg tablet   • lisinopril (ZESTRIL) 30 mg tablet   • lisinopril (ZESTRIL) 40 mg tablet   • loperamide (IMODIUM) 2 mg capsule   • meclizine (ANTIVERT) 25 mg tablet   • Melatonin 3 MG CAPS   • melatonin 3 mg   • Menthol-Methyl Salicylate ([employer] MUSCLE RUB) 10-15 % CREA   • metFORMIN (GLUCOPHAGE) 1000 MG tablet   • metoprolol tartrate (LOPRESSOR) 50 mg tablet   • Metoprolol Tartrate 75 MG TABS   • NovoLOG FlexPen 100 units/mL injection pen   • nystatin (MYCOSTATIN) ointment   • nystatin (MYCOSTATIN) powder   • omeprazole (PriLOSEC) 40 MG capsule   • pantoprazole (PROTONIX) 40 mg tablet   • polyethylene glycol (GOLYTELY) 4000 mL solution   • polyethylene glycol (MIRALAX) 17 g packet   • polyvinyl alcohol (LIQUIFILM TEARS) 1 4 % ophthalmic solution   • Senna S 8 6-50 MG per tablet   • silver sulfadiazine (SILVADENE,SSD) 1 % cream   • spironolactone (ALDACTONE) 25 mg tablet   • tamsulosin (FLOMAX) 0 4 mg     Counseling / Coordination of Care  Total floor / unit time spent today 20 minutes  Greater than 50% of total time was spent with the patient and / or family counseling and / or coordination of care  ** Please Note: Dragon 360 Dictation voice to text software may have been used in the creation of this document   **

## 2023-03-07 NOTE — INCIDENTAL FINDINGS
The following findings require follow up:  Radiographic finding   Finding: "Mediastinal lymphadenopathy   Indeterminate hepatic lesions, may represent metastasis, recommend nonemergent multiphase CT or MRI  Splenomegaly "   Follow up required: MRI abdomen   Follow up should be done within 1 month(s)    Please notify the following clinician to assist with the follow up:   Dr Maribeth Frazier

## 2023-03-07 NOTE — ASSESSMENT & PLAN NOTE
· Patient with acute episode of near syncope while showering with left-sided chest pain and shortness of breath  EKG showed left bundle branch block  2-hour delta troponin 3  Maintained on telemetry  Heparin drip initiated however discontinued due to hemoptysis and age-adjusted D-dimer  Continue on aspirin and statin and nitro  · Cardiology consult and support appreciated, continue to treat for congestive heart failure likely due to severe aortic stenosis  · Transition to p o  Lasix, 60 mg daily  · Increased aldactone to 25 mg daily

## 2023-03-07 NOTE — ASSESSMENT & PLAN NOTE
· Reported on her CAT scan  Noted to have multiple ultrasounds as late as 2021 without suspicious hepatic masses    · Will require further imaging as outpatient to include either triple phase CT or MRI

## 2023-03-07 NOTE — ASSESSMENT & PLAN NOTE
Lab Results   Component Value Date    HGBA1C 5 9 (H) 03/03/2023       Recent Labs     03/06/23 2026 03/07/23  0420 03/07/23  0626 03/07/23  0756   POCGLU 175* 421* 212* 203*       Blood Sugar Average: Last 72 hrs:  · (P) 556 [id number]   · well controlled per most recent hgb a1c  · Continue home insulin regimen  · Sliding scale initiated

## 2023-03-07 NOTE — CASE MANAGEMENT
Case Management Discharge Planning Note    Patient name Kandice Mccoy  Location S /S -01 MRN [id number]  : 1945 Date 3/7/2023       Current Admission Date: 3/3/2023  Current Admission Diagnosis:Acute diastolic congestive heart failure [facility])   Patient Active Problem List    Diagnosis Date Noted   • Hepatic lesion 2023   • Hypoxia 2023   • Acute diastolic congestive heart failure ([place] [address] ) 2023   • Elevated troponin 2023   • Thrombocytopenia, unspecified ([place] [address] ) 2021   • Anemia 2021   • AVM (arteriovenous malformation) of colon 2021   • Gastritis without bleeding 2021   • Vulvar candidiasis 2021   • Intertrigo 2021   • Panniculitis 2021   • Morbid obesity with BMI of 40 0-44 9, adult ([place] [address] ) 2020   • Hematochezia 2020   • NEHEMIAS (acute kidney injury) ([place] [address] ) 2020   • Traumatic head injury with multiple lacerations 10/02/2020   • Fall 10/02/2020   • Chronic edema 2020   • Iron deficiency anemia 2020   • Heart block, AV 2020   • History of cardiac pacemaker 2020   • Recurrent major depressive disorder ([place] [address] ) 2020   • Vitamin D deficiency 2020   • History of CVA (cerebrovascular accident) 2020   • Neuropathy 2020   • Pancytopenia ([place] [address] ) 2020   • Hepatic cirrhosis ([place] [address] ) 2020   • History of GI bleed 2020   • Urinary retention 2020   • Primary insomnia 2020   • Dry eyes, bilateral 2020   • Herpes zoster infection 2020   • Absence of both cervix and uterus, acquired 2019   • Nonrheumatic aortic (valve) stenosis 2019   • Presence of left artificial ankle joint 2019   • Presence of right artificial knee joint 2019   • Neck pain 2019   • Cervical spondylosis without myelopathy 2019   • Occlusion and stenosis of unspecified carotid artery 2018   • Other fecal abnormalities 2018   • Overflow incontinence 12/06/2017   • Pure hypercholesterolemia 07/06/2017   • Anxiety disorder, unspecified 05/12/2017   • Benign neoplasm of brain, unspecified ([facility] ) 05/12/2017   • Constipation, unspecified 05/12/2017   • Dysarthria following unspecified cerebrovascular disease 05/12/2017   • Hemiplegia and hemiparesis following unspecified cerebrovascular disease affecting right dominant side ([address] ) 05/12/2017   • Long term (current) use of insulin ([facility] ) 05/12/2017   • Mixed hyperlipidemia 05/12/2017   • Other chronic pain 05/12/2017   • Other symptoms and signs involving cognitive functions following unspecified cerebrovascular disease 05/12/2017   • Polyneuropathy, unspecified 05/12/2017   • Type 2 diabetes mellitus with diabetic neuropathy, unspecified ([facility] ) 05/12/2017   • Unspecified right bundle-branch block 05/12/2017   • Dysphagia 03/23/2017   • Mitral valve stenosis 03/23/2017   • Cerebrovascular accident (CVA) due to thrombosis of left middle cerebral artery ([facility] ) 03/21/2017   • Hemiparesis, right ([address] ) 03/21/2017   • Type 2 diabetes mellitus ([address] ) 10/21/2013   • Hypertension 10/21/2013   • Hearing loss 10/21/2013   • Arthritis 10/21/2013   • Benign neoplasm of pituitary gland ([facility] ) 10/02/2013      LOS (days): 3  Geometric Mean LOS (GMLOS) (days): 3 90  Days to GMLOS:0 9     OBJECTIVE:  Risk of Unplanned Readmission Score: 23 39         Current admission status: Inpatient   Preferred Pharmacy:   [facility] [address]  [address] [address]  Phone: [phone] Fax: [address] [place] [address], [address] [place] [address]  Phone: [phone] Fax: [phone]    Primary Care Provider: Chantell Kincaid MD    Primary Insurance: [employer] [facility]  Secondary Insurance: [id number] [facility]    DISCHARGE DETAILS:    Discharge planning discussed with[de-identified] patient's niece, Juan Singh, by phone  Freedom of Choice: Yes (re: facility return)  Comments - Freedom of Choice: returning to [facility] contacted family/caregiver?: Yes (Germán parada, by phone)  Were Treatment Team discharge recommendations reviewed with patient/caregiver?: Yes  Did patient/caregiver verbalize understanding of patient care needs?: N/A- going to facility  Were patient/caregiver advised of the risks associated with not following Treatment Team discharge recommendations?: Yes    Contacts  Patient Contacts: Germán Lio tal  Relationship to Patient[de-identified] Family  Contact Method: Phone  Reason/Outcome: Continuity of Care, Referral, Discharge Planning, Emergency Contact    [facility]         Is the patient interested in [facility] at discharge?: No    DME Referral Provided  Referral made for DME?: No    Other Referral/Resources/Interventions Provided:  Interventions: SNF  Referral Comments: Per PA, patient stable for d/c back to [facility] this afternoon - reports patient will need to be on 1-2L o2 secondary to her aortic stenosis  Same relayed to [address] [place] [address] via [address] and they confirmed ability to accept patient back this afternoon  Transport requested for 12:30pm; awaiting confirmation of same  Call made patient's nieceGermán Stable, and reviewed d/c and anticipated transfer for this afternoon; niece confirmed agreement with same  Will forward AVS to facility once complete  Would you like to participate in our [facility] service program?  : No - Declined    Treatment Team Recommendation: SNF  Discharge Destination Plan[de-identified] SNF ([address] [place] [address])  Transport at Discharge : [facility]  Dispatcher Contacted: Yes  Number/Name of Dispatcher: Zacarias     ETA of Transport (Date): 03/07/23  ETA of Transport (Time): 0785 (requested; awaiting confirmation)              IMM Given (Date):: 03/06/23  IMM reviewed with Germán parada, niece agrees with discharge determination                Accepting Facility Name, [address] : [facility]  Receiving Facility/Agency Phone Number: [phone]  Facility/Agency Fax Number: [phone]

## 2023-03-07 NOTE — DISCHARGE SUMMARY
[facility]- Tyesha Smith 1945, 68 y o  female MRN: [id number]  Unit/Bed#: S -01 Encounter: [id number]  Primary Care Provider: Eric Pereira MD   Date and time admitted to hospital: 3/3/2023  7:01 PM    * Acute diastolic congestive heart failure ([address] )  Assessment & Plan  · Patient with acute episode of near syncope while showering with left-sided chest pain and shortness of breath  EKG showed left bundle branch block  2-hour delta troponin 3  Maintained on telemetry  Heparin drip initiated however discontinued due to hemoptysis and age-adjusted D-dimer  Continue on aspirin and statin and nitro  · Cardiology consult and support appreciated, continue to treat for congestive heart failure likely due to severe aortic stenosis  · Transition to p o  Lasix, 60 mg daily  · Increased aldactone to 25 mg daily  Hepatic lesion  Assessment & Plan  · Reported on her CAT scan  Noted to have multiple ultrasounds as late as 2021 without suspicious hepatic masses  · Will require further imaging as outpatient to include either triple phase CT or MRI    Hypoxia  Assessment & Plan  Presented to the emergency department with chest pressure, shortness of breath and hypoxia  · CAT scan obtained which showed pulmonary edema  · D-dimer age adjusted as normal, heparin gtt discontinued due to improving hypoxia with diuresis and risk of hemoptysis  · Noted to have severe aortic stenosis  · Cardiology consultation appreciated and felt to be consistent with congestive heart failure, improving with diuresis  She is currently requiring 1 5 L  · Would recommend dc to nursing home on O2, may be lifelong need  Elevated troponin  Assessment & Plan  · Likely secondary to CHF exacerbation  · No invasive work-up per cardiology      Nonrheumatic aortic (valve) stenosis  Assessment & Plan  · Severe stenosis per recent echocardiogram follow-ups with cardiology with no surgical intervention recommended due to poor candidacy  Continue medical management and monitoring of volume status    History of CVA (cerebrovascular accident)  Assessment & Plan  · Continue supportive care    Hypertension  Assessment & Plan  · Pressures acceptable on regimen of spironolactone, Lopressor  Currently off IV diuresis  · /62    Type 2 diabetes mellitus [facility])  Assessment & Plan  Lab Results   Component Value Date    HGBA1C 5 9 (H) 03/03/2023       Recent Labs     03/06/23  2026 03/07/23  0420 03/07/23  0626 03/07/23  0756   POCGLU 175* 421* 212* 203*       Blood Sugar Average: Last 72 hrs:  · (P) 971 [id number]   · well controlled per most recent hgb a1c  · Continue home insulin regimen  · Sliding scale initiated    History of cardiac pacemaker  Assessment & Plan  · History of AV block    Iron deficiency anemia  Assessment & Plan  · Hemoglobin 8 at baseline with a history of AVM and GI bleed  Continue to trend      Medical Problems     Resolved Problems  Date Reviewed: 3/6/2023   None       Discharging Physician / Practitioner: Nae Cagle PA-C  PCP: Daniel Waters MD  Admission Date:   Admission Orders (From admission, onward)     Ordered        03/04/23 1034  Inpatient Admission  Once            03/03/23 2302  Place in Observation  Once                      Discharge Date: 03/07/23    Disposition:    [facility]     Reason for Admission: CHF Exacerbation    Discharge Diagnoses:   Please see assessment and plan section above for further details regarding discharge diagnoses  Consultations During Hospital Stay:  · Cardiology     Procedures Performed:   · None      Significant Findings / Test Results:   · Severe aortic stenosis  Stable renal function noted      Incidental Findings:   · Lymphadenopathy as well as possible hepatic lesions and splenomegaly noted on CT, recommending nonemergent MRI of the abdomen as well as outpatient follow-up    Test Results Pending at Discharge (will require follow up): · None      Outpatient Tests Requested:  · With a CT abdomen versus MRI abdomen for further characterization of liver lesions    Complications:  no    Hospital Course:      Deidra Chau is a 68 y o  female patient who originally presented to the hospital on 3/3/2023 due to chest pain and shortness of breath  Found to be in acute CHF exacerbation with NSTEMI secondary to CHF  She has severe aortic stenosis which is likely the culprit and is not an operative candidate at this point  Also notably was found to have incidental findings on CT as noted above  She was placed on supplemental O2 and diuresed with IV Lasix  We recommend increasing her spironolactone to 25 mg daily as well as her Lasix to 60 mg daily  We recommend discharging her with home oxygen as this did help her and this is likely a lifelong need due to her end-stage aortic stenosis  Recommend outpatient follow-up with family doctor as well as further imaging to characterize liver lesions and goals of care from there  She is okay to return back to [facility] today  Condition at Discharge: stable    Discharge Day Visit / Exam:   Subjective: Patient complains of some foot pain but overall no shortness of breath  I helped her eat breakfast today  She wants to go back home today  Vitals: Blood Pressure: 112/62 (03/07/23 0535)  Pulse: 70 (03/07/23 0535)  Temperature: 98 5 °F (36 9 °C) (03/07/23 0421)  Temp Source: Oral (03/05/23 2129)  Respirations: 18 (03/07/23 0421)  Weight - Scale: 102 kg (224 lb 3 3 oz) (03/07/23 0600)  SpO2: 96 % (03/07/23 0421)  Exam:   Physical Exam  Vitals and nursing note reviewed  Constitutional:       General: She is not in acute distress  Appearance: Normal appearance  She is obese  Interventions: Nasal cannula in place  Comments: YUAN PETITT:      Head: Normocephalic        Mouth/Throat:      Mouth: Mucous membranes are moist    Eyes:      General: No scleral icterus  Pupils: Pupils are equal, round, and reactive to light  Cardiovascular:      Rate and Rhythm: Normal rate and regular rhythm  Heart sounds: No murmur heard  Pulmonary:      Effort: Pulmonary effort is normal  No respiratory distress  Breath sounds: Normal breath sounds  No wheezing, rhonchi or rales  Abdominal:      General: Bowel sounds are normal  There is no distension  Palpations: Abdomen is soft  Tenderness: There is no abdominal tenderness  Musculoskeletal:         General: No swelling  Right lower leg: Edema present  Left lower leg: Edema present  Skin:     Capillary Refill: Capillary refill takes less than 2 seconds  Neurological:      General: No focal deficit present  Mental Status: She is alert and oriented to person, place, and time  Mental status is at baseline  Discussion with Family: no    Medication Adjustments and Discharge Medications:  · Discharge Medication List: See after visit summary for reconciled discharge medications  · Medication Dosing Tapers - Please refer to Discharge Medication List for details on any medication dosing tapers (if applicable to patient)  · Summary of Medication Adjustments made as a result of this hospitalization: Increase Lasix to 60 mg daily and aldactone to 25 mg daily  · Medications being temporarily held (include recommended restart time): no    Wound Care Recommendations:  When applicable, please see wound care section of After Visit Summary  Instructions for any Catheters / Lines Present at Discharge (including removal date, if applicable): no    Diet Recommendations at Discharge:  Diet -        Diet Orders   (From admission, onward)             Start     Ordered    03/05/23 0907  Diet Cardiovascular; Sodium 2 GM;  Fluid Restriction 2000 ML  Diet effective now        References:    Nutrtion Support Algorithm Enteral vs  Parenteral   Question Answer Comment   Diet Type Cardiovascular Cardiac Sodium 2 GM    Other Restriction(s): Fluid Restriction 2000 ML    RD to adjust diet per protocol? Yes        03/05/23 0907                Goals of Care Discussions:  · Code Status at Discharge: Level 3 - DNAR and DNI  · Goals of care were not discussed during this admission  Discharge instructions/Information to patient and family:   See after visit summary section titled Discharge Instructions for information provided to patient and family  Planned Readmission: no      Discharge Statement:  I spent 45 minutes discharging the patient  This time was spent on the day of discharge  I had direct contact with the patient on the day of discharge  Greater than 50% of the total time was spent examining patient, answering all patient questions, arranging and discussing plan of care with patient as well as directly providing post-discharge instructions  Additional time then spent on discharge activities  **Please Note: This note may have been constructed using a voice recognition system  **

## 2023-03-07 NOTE — ASSESSMENT & PLAN NOTE
Presented to the emergency department with chest pressure, shortness of breath and hypoxia  · CAT scan obtained which showed pulmonary edema  · D-dimer age adjusted as normal, heparin gtt discontinued due to improving hypoxia with diuresis and risk of hemoptysis  · Noted to have severe aortic stenosis  · Cardiology consultation appreciated and felt to be consistent with congestive heart failure, improving with diuresis  She is currently requiring 1 5 L  · Would recommend dc to nursing home on O2, may be lifelong need

## 2023-03-08 ENCOUNTER — TELEPHONE (OUTPATIENT)
Dept: OTHER | Facility: OTHER | Age: 78
End: 2023-03-08

## 2023-03-08 NOTE — UTILIZATION REVIEW
NOTIFICATION OF ADMISSION DISCHARGE   This is a Notification of Discharge from [address]  Please be advised that this patient has been discharge from our facility  Below you will find the admission and discharge date and time including the patient’s disposition  UTILIZATION REVIEW CONTACT:  Kenny Dean MA  Utilization   Network Utilization Review Department  Phone: [phone] x carefully listen to the prompts  All voicemails are confidential   Email: [email or web address]     ADMISSION INFORMATION  PRESENTATION DATE: 3/3/2023  7:01 PM  OBERVATION ADMISSION DATE:   INPATIENT ADMISSION DATE: 3/4/23 10:34 AM   DISCHARGE DATE: 3/7/2023  1:23 PM   DISPOSITION:Released to SNF/TCU/SNU Facility    IMPORTANT INFORMATION:  Send all requests for admission clinical reviews, approved or denied determinations and any other requests to dedicated fax number below belonging to the campus where the patient is receiving treatment   List of dedicated fax numbers:  [address] DENIALS (Administrative/Medical Necessity) [phone]   [address] (Maternity/NICU/Pediatrics) [phone]   [facility] [phone]   [place] [address] [phone]   [address] [phone]   220 [facility] [phone] 90 [facility] [phone]   [address] [place] [address] 119 [phone]   [facility]  [phone]   [address] [facility] [phone] 412 [facility] [address] [facility] [phone]

## 2023-03-08 NOTE — TELEPHONE ENCOUNTER
Received a callback from [facility], their nursing supervisor would like to know if there are any recommendations based on patient's CT results  They would like a call/fax to advise

## 2023-03-08 NOTE — TELEPHONE ENCOUNTER
Please see CT results from 3/4/23 - indeterminate hepatic lesions, may represent metastasis recommend non emergent multiphase CT or MRI  Please advise

## 2023-03-13 ENCOUNTER — OFFICE VISIT (OUTPATIENT)
Dept: OBGYN CLINIC | Facility: CLINIC | Age: 78
End: 2023-03-13

## 2023-03-13 VITALS
HEIGHT: 62 IN | DIASTOLIC BLOOD PRESSURE: 63 MMHG | SYSTOLIC BLOOD PRESSURE: 96 MMHG | BODY MASS INDEX: 41.28 KG/M2 | HEART RATE: 60 BPM | WEIGHT: 224.3 LBS

## 2023-03-13 DIAGNOSIS — M19.079 ARTHRITIS OF FOOT: ICD-10-CM

## 2023-03-13 DIAGNOSIS — M25.532 PAIN IN LEFT WRIST: Primary | ICD-10-CM

## 2023-03-13 DIAGNOSIS — E11.49 OTHER DIABETIC NEUROLOGICAL COMPLICATION ASSOCIATED WITH TYPE 2 DIABETES MELLITUS (HCC): ICD-10-CM

## 2023-03-13 RX ORDER — MELOXICAM 15 MG/1
15 TABLET ORAL DAILY
Qty: 30 TABLET | Refills: 2 | Status: CANCELLED | OUTPATIENT
Start: 2023-03-13

## 2023-03-13 RX ORDER — INSULIN ASPART 100 [IU]/ML
100 INJECTION, SOLUTION INTRAVENOUS; SUBCUTANEOUS DAILY
COMMUNITY
Start: 2023-03-02

## 2023-03-13 RX ORDER — FLUCONAZOLE 150 MG/1
150 TABLET ORAL DAILY
COMMUNITY
Start: 2023-02-17

## 2023-03-13 RX ORDER — CHOLECALCIFEROL (VITAMIN D3) 50 MCG
1 TABLET ORAL DAILY
COMMUNITY
Start: 2023-01-18

## 2023-03-13 NOTE — PROGRESS NOTES
Orthopaedics Office Visit - New  Patient Visit    ASSESSMENT/PLAN:    Assessment:   1  Bilateral diabetic neuropathy in the feet  2  Left foot midfoot and hindfoot osteoarthritis    Plan:   · Patient has no acute injuries and has known bilateral diabetic neuropathy which she is on gabapentin for  She is having acute exacerbation of this  She complains of pins-and-needles in her feet  No trauma  States she is unable to walk because of this  · Patient should continue to take Tylenol as needed for pain relief  Patient unable to take anti-inflammatories due to impaired kidney function  As needed for pain relief for her feet and should continue on the gabapentin with dose adjustment per primary doctor  · Patient will also be sent for a follow-up with podiatry for management of chronic midfoot and hindfoot osteoarthritis and diabetic neuropathy of the feet for chronic surveillance  · Weightbearing as tolerated bilateral lower extremities  · No need to follow-up with orthopedic trauma in future    To Do Next Visit:  Follow-up podiatry    _____________________________________________________  CHIEF COMPLAINT:  Chief Complaint   Patient presents with   • Left Wrist - Pain         SUBJECTIVE:  Lamont Vann is a 68 y o  female who presents today evaluation for bilateral foot pain  She was referred by her PCP Dr Jarett Awad  Patient has history of bilateral diabetic neuropathy which she states has been present for quite some time  She states that over the past week she is having increasing amounts of pain  She describes the pain as pins-and-needles in the bottom of her foot which is making her unable to walk  She also has some tenderness and swelling over the midfoot  Patient had no traumatic injuries and has not had any slip and fall episodes  She is essentially wheelchair-bound at this point and does not mobilize  Denies any fevers or chills    Denies any trauma    PAST MEDICAL HISTORY:  Past Medical History: Diagnosis Date   • Anemia    • AV block    • Benign neoplasm of pituitary gland (HCC) 10/2/2013   • Chronic kidney disease    • Chronic pain disorder    • Cirrhosis of liver (HCC)    • CVA (cerebral vascular accident) ([facility] )    • Depression    • Diabetes mellitus ([facility] )    • Hearing loss    • Hemiparesis due to old cerebrovascular accident ([facility] )    • Hyperlipidemia    • Neuropathy    • Stenosis of carotid artery    • Stroke [facility])        PAST SURGICAL HISTORY:  Past Surgical History:   Procedure Laterality Date   • CARDIAC PACEMAKER PLACEMENT     • IR PARACENTESIS  12/24/2020   • JOINT REPLACEMENT      right knee       FAMILY HISTORY:  Family History   Problem Relation Age of Onset   • No Known Problems Mother    • No Known Problems Father        SOCIAL HISTORY:  Social History     Tobacco Use   • Smoking status: Former   • Smokeless tobacco: Never   Vaping Use   • Vaping Use: Never used   Substance Use Topics   • Alcohol use: Not Currently   • Drug use: Never       MEDICATIONS:    Current Outpatient Medications:   •  acetaminophen (TYLENOL) 325 mg tablet, Take 650 mg by mouth every 4 (four) hours as needed, Disp: , Rfl:   •  ascorbic acid (VITAMIN C) 500 MG tablet, Take 500 mg by mouth daily, Disp: , Rfl:   •  aspirin (ECOTRIN LOW STRENGTH) 81 mg EC tablet, Take 81 mg by mouth daily, Disp: , Rfl:   •  Cholecalciferol (Vitamin D) 50 MCG (2000 UT) tablet, Take 1 tablet by mouth in the morning, Disp: , Rfl:   •  cholecalciferol (VITAMIN D3) 1,000 units tablet, , Disp: , Rfl:   •  dextran 70-hypromellose (Artificial Tears) 0 1-0 3 % ophthalmic solution, 1 drop every 3 (three) hours as needed  , Disp: , Rfl:   •  Diclofenac Sodium (VOLTAREN) 1 %, Apply 1 g topically 2 (two) times a day as needed, Disp: , Rfl:   •  DropSafe Safety Pen Needles 31G X 6 MM MISC, , Disp: , Rfl:   •  escitalopram (LEXAPRO) 10 mg tablet, Take 10 mg by mouth daily, Disp: , Rfl:   •  ferrous sulfate 325 (65 Fe) mg tablet, Take 325 mg by mouth daily, Disp: , Rfl:   •  fluconazole (DIFLUCAN) 150 mg tablet, Take 150 mg by mouth in the morning, Disp: , Rfl:   •  furosemide (LASIX) 20 mg tablet, Take 3 tablets (60 mg total) by mouth daily Do not start before March 8, 2023 , Disp: , Rfl: 0  •  gabapentin (NEURONTIN) 300 mg capsule, , Disp: , Rfl:   •  glucagon 1 MG injection, Inject 1 mg into a muscle once as needed, Disp: , Rfl:   •  insulin glargine (LANTUS) 100 units/mL subcutaneous injection, Inject 38 Units under the skin every 12 (twelve) hours, Disp: 10 mL, Rfl: 0  •  insulin lispro (HumaLOG) 100 units/mL injection, Inject 52 Units under the skin 3 (three) times a day with meals, Disp: , Rfl: 0  •  Melatonin 3 MG CAPS, Take 3 mg by mouth daily at bedtime, Disp: , Rfl:   •  metoprolol tartrate (LOPRESSOR) 50 mg tablet, Take 75 mg by mouth every 12 (twelve) hours, Disp: , Rfl:   •  miconazole 2 % cream, Apply 2 g topically 2 (two) times a day as needed, Disp: , Rfl:   •  NovoLOG 100 UNIT/ML injection, Inject 100 mL under the skin in the morning, Disp: , Rfl:   •  omeprazole (PriLOSEC) 40 MG capsule, , Disp: , Rfl:   •  polyethylene glycol (MIRALAX) 17 g packet, Take 17 g by mouth daily as needed, Disp: , Rfl:   •  polyvinyl alcohol (LIQUIFILM TEARS) 1 4 % ophthalmic solution, Administer 1 drop to both eyes as needed  , Disp: , Rfl:   •  Senna S 8 6-50 MG per tablet, , Disp: , Rfl:   •  spironolactone (ALDACTONE) 25 mg tablet, Take 1 tablet (25 mg total) by mouth daily Do not start before March 8, 2023 , Disp: , Rfl: 0  •  tamsulosin (FLOMAX) 0 4 mg, Take 2 capsules by mouth daily, Disp: , Rfl:     ALLERGIES:  No Known Allergies    REVIEW OF SYSTEMS:  MSK: Bilateral foot pain  Neuro: None   Pertinent items are otherwise noted in HPI  A comprehensive review of systems was otherwise negative      LABS:  HgA1c:   Lab Results   Component Value Date    HGBA1C 5 9 (H) 03/03/2023     BMP:   Lab Results   Component Value Date    CALCIUM 9 0 03/07/2023    K 4 1 03/07/2023    CO2 27 03/07/2023    CL 99 03/07/2023    BUN 48 (H) 03/07/2023    CREATININE 2 02 (H) 03/07/2023     CBC: No components found for: CBC    _____________________________________________________  PHYSICAL EXAMINATION:  Vital signs: There were no vitals taken for this visit  General: No acute distress, awake and alert  Psychiatric: Mood and affect appear appropriate  HEENT: Trachea Midline, No torticollis, no apparent facial trauma  Cardiovascular: No audible murmurs; Extremities appear perfused  Pulmonary: No audible wheezing or stridor  Skin: No open lesions; see further details (if any) below    MUSCULOSKELETAL EXAMINATION:  Extremities:      Bilateral lower extremities  Patient's feet were exposed inspected  The patient's left foot demonstrated skin changes associated with chronic venous stasis  Patient has fluid overload from acute heart failure that she is currently and was recently seen in the emergency department for  She is not on any blood thinners  Patient has decreased sensation and pins-and-needles from the toes to the midfoot  She has tenderness to palpation over the midfoot articulations and over the talar clavicular joint  She has no tenderness to palpation in the forefoot  Patient is able to actively dorsiflex and plantarflex the foot  Activate extensor houses longus, gastrocnemius  No bony tenderness or step-offs  Patient's right foot demonstrated skin changes associated with chronic venous stasis and slight redness in the calf which is nontender  She has decreased sensation and the feeling of pins-and-needles in the forefoot  She has tenderness to palpation over the dorsal aspect of the midfoot articulations and the hindfoot  No bony step-offs or instability palpated  Patient able to activate extensor houses longus, gastrocnemius, tibialis anterior  Brisk capillary refill in all 5 digits        _____________________________________________________  STUDIES REVIEWED:  I personally reviewed the images and interpretation is as follows:  X-rays of the left foot which were previously obtained after a traumatic injury demonstrated left midfoot and hindfoot osteoarthritic changes with diffuse osteopenia    No acute fractures dislocations    PROCEDURES PERFORMED:  Procedures    Scribe Attestation    I,:  Cinthia Early am acting as a scribe while in the presence of the attending physician :       I,:  Gale Flores DO personally performed the services described in this documentation    as scribed in my presence :

## 2023-03-13 NOTE — PATIENT INSTRUCTIONS
Left foot arthritis and bilateral diabetic neuropathy  Weightbearing as tolerated   Prescribed patient Meloxicam 15 mg for pain relief   Will be referring patient to Podiatry Dr Frederick Tang for bilateral foot diabetic neuropathy   Follow up PRN

## 2023-03-15 DIAGNOSIS — K76.9 LESION OF LIVER: Primary | ICD-10-CM

## 2023-03-16 NOTE — TELEPHONE ENCOUNTER
Spoke with Sofi from [facility], she explained she received a call last week and everything was handled  I explained there was additional information I wanted to review with her  She explained she will give me a call back and I provided main number to call back

## 2023-03-16 NOTE — TELEPHONE ENCOUNTER
Sofi called back in, reviewed provider recommendations  She is requesting all orders be faxed to [facility] at [phone]   Will fax over

## 2023-03-16 NOTE — TELEPHONE ENCOUNTER
Tika Martinez called in stating the nurse manager would like to know why this pt has to see a liver specialist

## 2023-03-16 NOTE — TELEPHONE ENCOUNTER
Called [facility] [phone], spoke with Shelly Hanson, scheduled ov for pt with Dr Sunshine Melgar on 3/20/23 at 12:30 PM at [facility]  As requested by Sofi Tyson,  faxed lab order and US (ordered by Dr Clari Whatley) to the fax number provided ([phone]  Fax confirmation scanned into pt's chart (media)

## 2023-03-16 NOTE — TELEPHONE ENCOUNTER
Spoke with Pete Saab, explained to her due to the liver lesions seen on her last CT scan it was recommended she see a hepatologist  No further questions

## 2023-03-17 ENCOUNTER — TELEPHONE (OUTPATIENT)
Dept: GASTROENTEROLOGY | Facility: AMBULARY SURGERY CENTER | Age: 78
End: 2023-03-17

## 2023-03-17 DIAGNOSIS — N17.9 AKI (ACUTE KIDNEY INJURY) (HCC): Primary | ICD-10-CM

## 2023-03-17 NOTE — TELEPHONE ENCOUNTER
Spoke with Sofi from [facility], she is asking for a referral to be placed for nephrology since their transportation company is denying the service for hepatic lesions  I explained again she was referred to nephrology due to her lab results from 3/7 noting increase in BUN/ creatine  She would not be able to have CT or MRI done for further evaluate the hepatic lesions with CKD  They are requesting something in writing for this or a referral placed       Please place referral for nephrologist and they will be taking her to [facility] on [address] in [place] (or it can be a blank referral)   Fax number is [phone]

## 2023-03-20 ENCOUNTER — OFFICE VISIT (OUTPATIENT)
Dept: GASTROENTEROLOGY | Facility: CLINIC | Age: 78
End: 2023-03-20

## 2023-03-20 VITALS
BODY MASS INDEX: 41.61 KG/M2 | DIASTOLIC BLOOD PRESSURE: 62 MMHG | TEMPERATURE: 97.3 F | HEIGHT: 61 IN | SYSTOLIC BLOOD PRESSURE: 124 MMHG | WEIGHT: 220.4 LBS

## 2023-03-20 DIAGNOSIS — K74.60 CIRRHOSIS OF LIVER WITH ASCITES, UNSPECIFIED HEPATIC CIRRHOSIS TYPE (HCC): ICD-10-CM

## 2023-03-20 DIAGNOSIS — R18.8 CIRRHOSIS OF LIVER WITH ASCITES, UNSPECIFIED HEPATIC CIRRHOSIS TYPE (HCC): ICD-10-CM

## 2023-03-20 DIAGNOSIS — E66.01 MORBID OBESITY WITH BMI OF 40.0-44.9, ADULT (HCC): ICD-10-CM

## 2023-03-20 DIAGNOSIS — I85.10 SECONDARY ESOPHAGEAL VARICES WITHOUT BLEEDING (HCC): ICD-10-CM

## 2023-03-20 DIAGNOSIS — K76.9 LESION OF LIVER: Primary | ICD-10-CM

## 2023-03-20 NOTE — PROGRESS NOTES
[facility] - Outpatient Consultation  Devon Davis 68 y o  female MRN: [id number]  Encounter: [id number]    PCP:  Zahra Vegas MD, [phone]  Referring Provider:  No ref  provider found,     Patient: Devon Davis, 1945  Reason for Referral: hepatic lesions     ASSESSMENT/PLAN:  68 y o  female with history of DM, CKD, CVA, HFpEF, AS, class III obesity, wheel-chair dependence of KRUEGER cirrhosis who presents for initial evaluation  She has no acute liver specific complaints or clinical evidence of hepatic decompensation  She was diagnosed with KRUEGER cirrhosis in 2020 when she decompensated with ascites requiring LVP  Ascitic fluid studies were more consistent with cardiac ascites given elevated Tp  She was started on diuretics but recently required an increase in dose after hospitalization for volume overload and hypoxia due to severe AS  She incidentally found to have two indeterminate hepatic lesions in the right lobe, with largest measuring 3 1 cm, on CT chest  Her last AFP was 5 1 and CT chest did not show any metastatic lesions  Her ECOG PS is 2-3  Would recommend further work-up with triple phase MRI abdomen to evaluate for [address]  vs  Hepatic metastases as she cannot undergo a contrasted CT given history of CKD  If she is confirmed to have [address] , therapeutic options will be limited given her advanced age, comorbidities and functional status  She will return to clinic in 2 months or sooner if needed  Thank you for the opportunity to consult her care  1  Decompensated cirrhosis: MELD-Na 15, CTP A6  - Etiology: KRUEGER vs  cardiac  - Transplant: not currently a candidate given her age and comorbidities     2  Ascites / CHF due to severe AS    - Continue lasix 60 mg daily  - Continue aldactone 25 mg daily    3  Hepatic lesions  - Recommend MRI abdomen with and without contrast   - AFP wnl 3/2023    4   Healthcare maintenance for patients with cirrhosis  -She was instructed to take no more than 2 grams of tylenol in 24 hours and no products containing NSAIDs, benzodiazapines, and narcotics  -She was also instructed to avoid raw shellfish   -She should participate in daily exercise as to prevent loss of muscle mass  -She should abstain from all alcohol intake and was counseled on this     -She is at risk for vitamin deficiencies and metabolic bone disease  -HAV and HBV immunity will be checked and she should be vaccinated through her primary care provider if she is not immune   -Additionally, she should receive a yearly flu shot and the pneumonia vaccine through her primary care provider  Milta Merlin, MD  Division of Gastroenterology and Hepatology  [address] [place] [address],[facility] Floor    ============================================================================  CC/HPI: 68 y o  female with history of DM, CKD, CVA, HFpEF, AS, class III obesity, wheel-chair dependence of KRUEGER cirrhosis who presents for initial evaluation  She was diagnosed with KRUEGER cirrhosis in 2020 when she decompensated with ascites requiring LVP  She was started on diuretics and had an EGD which showed trace esophageal varices  She was last seen in GI clinic in April 2022 where was recommended that she update her [facility]  surveillance which was last performed in September 2021  She was recently admitted to [facility] for new hypoxia in the context of volume overload and new severe aortic stenosis  She was seen by cardiology who recommended conservative management given her advanced age and comorbidities  She had a CT chest without contrast which showed two indeterminate lesions in the right lobe, with the largest measuring 3 1 cm, as well as mediastinal lymphadenopathy  ROS: Complete review of systems otherwise negative       PAST MEDICAL/SURGICAL HISTORY:  Past Medical History:   Diagnosis Date   • Anemia    • AV block    • Benign neoplasm of pituitary gland ([facility] ) 10/2/2013   • Chronic kidney disease • Chronic pain disorder    • Cirrhosis of liver (HCC)    • CVA (cerebral vascular accident) [facility] )    • Depression    • Diabetes mellitus [facility] )    • Hearing loss    • Hemiparesis due to old cerebrovascular accident [facility])    • Hyperlipidemia    • Neuropathy    • Stenosis of carotid artery    • Stroke [facility])         Past Surgical History:   Procedure Laterality Date   • CARDIAC PACEMAKER PLACEMENT     • IR PARACENTESIS  12/24/2020   • JOINT REPLACEMENT      right knee       FAMILY/SOCIAL HISTORY:  Family History   Problem Relation Age of Onset   • No Known Problems Mother    • No Known Problems Father        Social History     Tobacco Use   • Smoking status: Former   • Smokeless tobacco: Never   Vaping Use   • Vaping Use: Never used   Substance Use Topics   • Alcohol use: Not Currently   • Drug use: Never       MEDICATIONS:  Current Outpatient Medications on File Prior to Visit   Medication Sig Dispense Refill   • acetaminophen (TYLENOL) 325 mg tablet Take 650 mg by mouth every 4 (four) hours as needed     • ascorbic acid (VITAMIN C) 500 MG tablet Take 500 mg by mouth daily     • aspirin (ECOTRIN LOW STRENGTH) 81 mg EC tablet Take 81 mg by mouth daily     • Cholecalciferol (Vitamin D) 50 MCG (2000 UT) tablet Take 1 tablet by mouth in the morning     • cholecalciferol (VITAMIN D3) 1,000 units tablet      • dextran 70-hypromellose (Artificial Tears) 0 1-0 3 % ophthalmic solution 1 drop every 3 (three) hours as needed       • Diclofenac Sodium (VOLTAREN) 1 % Apply 1 g topically 2 (two) times a day as needed     • escitalopram (LEXAPRO) 10 mg tablet Take 10 mg by mouth daily     • ferrous sulfate 325 (65 Fe) mg tablet Take 325 mg by mouth daily     • fluconazole (DIFLUCAN) 150 mg tablet Take 150 mg by mouth in the morning     • furosemide (LASIX) 20 mg tablet Take 3 tablets (60 mg total) by mouth daily Do not start before March 8, 2023   0   • gabapentin (NEURONTIN) 300 mg capsule      • glucagon 1 MG injection Inject 1 mg into a muscle once as needed     • insulin glargine (LANTUS) 100 units/mL subcutaneous injection Inject 38 Units under the skin every 12 (twelve) hours 10 mL 0   • insulin lispro (HumaLOG) 100 units/mL injection Inject 52 Units under the skin 3 (three) times a day with meals  0   • Melatonin 3 MG CAPS Take 3 mg by mouth daily at bedtime     • metoprolol tartrate (LOPRESSOR) 50 mg tablet Take 75 mg by mouth every 12 (twelve) hours     • miconazole 2 % cream Apply 2 g topically 2 (two) times a day as needed     • NovoLOG 100 UNIT/ML injection Inject 100 mL under the skin in the morning     • omeprazole (PriLOSEC) 40 MG capsule      • polyethylene glycol (MIRALAX) 17 g packet Take 17 g by mouth daily as needed     • polyvinyl alcohol (LIQUIFILM TEARS) 1 4 % ophthalmic solution Administer 1 drop to both eyes as needed       • Senna S 8 6-50 MG per tablet      • spironolactone (ALDACTONE) 25 mg tablet Take 1 tablet (25 mg total) by mouth daily Do not start before March 8, 2023   0   • tamsulosin (FLOMAX) 0 4 mg Take 2 capsules by mouth daily     • DropSafe Safety Pen Needles 31G X 6 MM MISC  (Patient not taking: Reported on 12/3/2021)       No current facility-administered medications on file prior to visit       No Known Allergies    PHYSICAL EXAM:  /62 (BP Location: Left arm, Patient Position: Sitting, Cuff Size: Adult)   Temp (!) 97 3 °F (36 3 °C) (Tympanic)   Ht 5' 1" (1 549 m)   Wt 100 kg (220 lb 6 4 oz)   BMI 41 64 kg/m²   GENERAL: NAD, AAO  HEENT: anicteric, OP clear, MMM  ABDOMEN: S/ND/NT, normoactive BS, no hepatomegaly, spleen not palpable  EXTREMITIES: 2+ pitting edema  SKIN: no rashes, no palmar erythema, no spider angiomata   NEURO: normal gait, no tremor, no asterixis     LABS/RADIOLOGY/ENDOSCOPY:  Lab Results   Component Value Date    WBC 4 89 03/07/2023    HGB 7 5 (L) 03/07/2023    HCT 24 8 (L) 03/07/2023    PLT 79 (L) 03/07/2023    BUN 48 (H) 03/07/2023    CREATININE 2 02 (H) 03/07/2023    K 4 1 03/07/2023    CL 99 03/07/2023    CO2 27 03/07/2023    ALKPHOS 58 03/03/2023    ALT 11 03/03/2023    AST 20 03/03/2023    CALCIUM 9 0 03/07/2023    EGFR 23 03/07/2023    TRIG 257 (H) 03/03/2023    HDL 24 (L) 03/03/2023    INR 1 34 (H) 03/03/2023    PTT 28 03/03/2023     CT chest (3/4/2023)  1  Diffuse pulmonary opacities favored to represent pulmonary edema  Multifocal pneumonia could have a similar appearance  2   Mediastinal lymphadenopathy  3  Indeterminate hepatic lesions, may represent metastasis, recommend nonemergent multiphase CT or MRI  4  Splenomegaly     RUQ US (9/2021)  Hepatic cirrhosis and mild steatosis  Cholecystectomy    EGD (6/2021)  • Two small and minimal grade I varices with no bleeding (no red memo sign) in the esophagus  • Moderate, patchy hemorrhagic mucosa in the fundus of the stomach, body of the stomach and antrum; performed 2 cold forceps biopsies  • No evidence of GOV1, GOV2, IGV1 or IGV2    • The duodenal bulb, 1st part of the duodenum and 2nd part of the duodenum appeared normal      COY (6/2021)  • Single large angioectasia with no bleeding in the ascending colon; induced coagulation with argon plasma coagulation; placed 1 clip successfully (clip is MRI conditional)  • One adenomatous-appearing, semi-pedunculated polyp measuring 5-9 mm in the ascending colon; completely removed en bloc by hot snare and retrieved specimen  • One adenomatous-appearing, semi-pedunculated polyp measuring 5-9 mm in the descending colon; completely removed en bloc by hot snare and retrieved specimen  • Two semi-pedunculated polyps measuring from 5 mm up to 12 mm in the sigmoid colon; completely removed en bloc by hot snare and retrieved specimen  • One 4 mm adenomatous-appearing, semi-pedunculated polyp in the rectosigmoid; completely removed en bloc by hot snare  • Large severe diverticula in the transverse colon, descending colon, sigmoid colon and rectosigmoid  • Small, internal hemorrhoids  • Overall quality of the prep was fair to good, there was moderate amount of brown liquid mixed with balls of stool, while the liquid was suctioned and washed, some of the solid balls of stool could not be fully suctioned therefore diminutive polyps were sessile polyps may have been missed      MELD-Na score: 15 at 3/5/2023  4:59 AM  MELD score: 15 at 3/5/2023  4:59 AM  Calculated from:  Serum Creatinine: 1 81 mg/dL at 3/5/2023  4:59 AM  Serum Sodium: 138 mmol/L (Using max of 137 mmol/L) at 3/5/2023  4:59 AM  Total Bilirubin: 0 59 mg/dL (Using min of 1 mg/dL) at 3/3/2023  7:18 PM  INR(ratio): 1 34 at 3/3/2023 10:13 PM  Age: 68 years

## 2023-03-22 ENCOUNTER — TELEPHONE (OUTPATIENT)
Dept: GASTROENTEROLOGY | Facility: CLINIC | Age: 78
End: 2023-03-22

## 2023-03-29 NOTE — TELEPHONE ENCOUNTER
Spoke to [id number], [address] -    Patient not immune to hepatitis a/b  Recommend hepatitis a/b vaccine  Recommendation faxed to [phone] as requested  and

## 2023-04-19 PROBLEM — I50.32 CHRONIC DIASTOLIC (CONGESTIVE) HEART FAILURE (HCC): Status: ACTIVE | Noted: 2023-04-19

## 2023-04-19 PROBLEM — N18.4 TYPE 2 DIABETES MELLITUS WITH STAGE 4 CHRONIC KIDNEY DISEASE AND HYPERTENSION (HCC): Status: ACTIVE | Noted: 2023-04-19

## 2023-04-19 PROBLEM — I12.9 TYPE 2 DIABETES MELLITUS WITH STAGE 4 CHRONIC KIDNEY DISEASE AND HYPERTENSION (HCC): Status: ACTIVE | Noted: 2023-04-19

## 2023-04-19 PROBLEM — I13.10 CARDIORENAL SYNDROME: Status: ACTIVE | Noted: 2023-04-19

## 2023-04-19 PROBLEM — E11.22 TYPE 2 DIABETES MELLITUS WITH STAGE 4 CHRONIC KIDNEY DISEASE AND HYPERTENSION (HCC): Status: ACTIVE | Noted: 2023-04-19

## 2023-05-18 ENCOUNTER — TELEPHONE (OUTPATIENT)
Dept: GASTROENTEROLOGY | Facility: CLINIC | Age: 78
End: 2023-05-18

## 2023-05-18 NOTE — TELEPHONE ENCOUNTER
Patients GI provider:  Dr Usha Diaz     Number to return call: [facility]    Reason for call: [facility] calling to reschedule patients appointment from 5/18 with Dr Usha Diaz  They are requesting ASAP appointment for patient  Please assist in scheduling due to no availability    Scheduled procedure/appointment date if applicable:

## 2023-05-23 ENCOUNTER — HOSPITAL ENCOUNTER (OUTPATIENT)
Dept: RADIOLOGY | Facility: HOSPITAL | Age: 78
Discharge: HOME | End: 2023-05-23

## 2023-05-23 DIAGNOSIS — K76.9 LESION OF LIVER: ICD-10-CM

## 2023-05-23 RX ADMIN — GADOBUTROL 7 ML: 604.72 INJECTION INTRAVENOUS at 15:14

## 2023-05-23 NOTE — NURSING NOTE
Pt arrived via ambulance from [facility] on stretcher  Pt not ambulatory and on 2L o2 chronic baseline  Pt hard of hearing  [place] device interrogation for MRI done by [employer], EP clinical specialist  Device set to MRI safe mode @80 bpm     MRI abdomen w/without contrast and mrcp complete  Pt tolerated well  VSS throughout scan  Pt alert and oriented  No complaints or visible signs of distress  Device reprogrammed to prior settings per Cardiology by [employer], RN

## 2023-05-23 NOTE — NURSING NOTE
Device interrogation for MRI  Normal device function prior to MRI  Leads and device meet all requirements per policy for MRI  Device programmed DOO 80bpm per Cardiology for MRI  Patient has no complaints  Vital signs monitored throughout by A  Krista Oliva RN   Normal device function post MRI  Device reprogrammed to prior settings per Cardiology

## 2023-05-24 NOTE — TELEPHONE ENCOUNTER
Sofiya Ley from [facility] calling to reschedule patients appointment with Dr Simental  No current available appointments  Sofiya Ley is asking if she could be contacted when an opening becomes available to get patient seen   Sofiya Ley can be reached at [phone]

## 2023-05-31 ENCOUNTER — DOCUMENTATION (OUTPATIENT)
Dept: HEMATOLOGY ONCOLOGY | Facility: CLINIC | Age: 78
End: 2023-05-31

## 2023-05-31 NOTE — PROGRESS NOTES
In-basket message received from Dr Randell Huitron to add patient to next available [facility] scheduled on 6/29/2023 Chart reviewed and prep completed

## 2023-06-07 ENCOUNTER — OFFICE VISIT (OUTPATIENT)
Dept: GASTROENTEROLOGY | Facility: CLINIC | Age: 78
End: 2023-06-07
Payer: COMMERCIAL

## 2023-06-07 ENCOUNTER — TELEPHONE (OUTPATIENT)
Dept: GASTROENTEROLOGY | Facility: CLINIC | Age: 78
End: 2023-06-07

## 2023-06-07 VITALS — SYSTOLIC BLOOD PRESSURE: 118 MMHG | DIASTOLIC BLOOD PRESSURE: 70 MMHG

## 2023-06-07 DIAGNOSIS — K74.60 CIRRHOSIS OF LIVER WITH ASCITES, UNSPECIFIED HEPATIC CIRRHOSIS TYPE (HCC): ICD-10-CM

## 2023-06-07 DIAGNOSIS — R18.8 CIRRHOSIS OF LIVER WITH ASCITES, UNSPECIFIED HEPATIC CIRRHOSIS TYPE (HCC): ICD-10-CM

## 2023-06-07 DIAGNOSIS — D69.6 THROMBOCYTOPENIA, UNSPECIFIED (HCC): ICD-10-CM

## 2023-06-07 DIAGNOSIS — C22.0 HEPATOCELLULAR CARCINOMA (HCC): Primary | ICD-10-CM

## 2023-06-07 PROCEDURE — 99214 OFFICE O/P EST MOD 30 MIN: CPT | Performed by: STUDENT IN AN ORGANIZED HEALTH CARE EDUCATION/TRAINING PROGRAM

## 2023-06-07 RX ORDER — SENNOSIDES 8.6 MG
TABLET ORAL
COMMUNITY
Start: 2023-03-07 | End: 2023-06-09

## 2023-06-07 RX ORDER — LANOLIN ALCOHOL/MO/W.PET/CERES
CREAM (GRAM) TOPICAL
COMMUNITY
Start: 2023-04-18

## 2023-06-07 NOTE — TELEPHONE ENCOUNTER
Patients GI provider:  Dr Lashell Holguin    Number to return call: 21     Reason for call: Pt was seen in office today       [address] calling regarding Labs/Orders faxed to there office unable to read what is written on forms  Fax#   [phone]    Scheduled procedure/appointment date if applicable: Apt/procedure

## 2023-06-07 NOTE — PROGRESS NOTES
[facility] - Outpatient Consultation  Chang Vargas 68 y o  female MRN: [id number]  Encounter: [id number]    PCP:  Anuel Simons MD, [phone]  Referring Provider:  No ref  provider found,     Patient: Chang Vargas, 1945  Reason for Referral: cirrhosis and [address]      ASSESSMENT/PLAN:  68 y o  female with history of DM, CKD, CVA, HFpEF, AS, class III obesity, wheel-chair dependence of KRUEGER cirrhosis who presents for follow up evaluation  She has no acute liver specific complaints or clinical evidence of hepatic decompensation      She was diagnosed with KRUEGER cirrhosis in 2020 when she decompensated with ascites requiring LVP  Ascitic fluid studies were more consistent with cardiac ascites given elevated Tp  She was started on diuretics but recently required an increase in dose after hospitalization for volume overload and hypoxia due to severe AS       She incidentally found to have two indeterminate hepatic lesions in the right lobe, with largest measuring 3 1 cm, on CT chest  Her last AFP was 5 1 and CT chest did not show any metastatic lesions  She recently had an MRI abdomen which showed [address]  based a 3 7 cm LR-5 lesion in segment 6 and arterial enhancing lesion measuring 4 8 cm in segment 8  Her ECOG PS is 2-3  We discussed that she has multinodular HCC but is BCLC stage D based her functional status  She was noted to have mediastinal lymphadenopathy on her CT chest and so will review her case at [facility] tumor board to evaluate for metastases  We did discuss with her and her niece that her therapeutic options will be limited given her advanced age, comorbidities and functional status       She will return to clinic in 3 months or sooner if needed  Thank you for the opportunity to consult her care      1  Decompensated cirrhosis: needs updated MELD labs   - Etiology: KRUEGER vs  cardiac  - Transplant: not currently a candidate given her age and comorbidities     2   Ascites / CHF due to severe AS    - Continue lasix 60 mg daily  - Continue aldactone 25 mg daily     3  BCLC stage D HCC  - Needs updated AFP  - Review at [facility] tumor board     4  Healthcare maintenance for patients with cirrhosis  -She was instructed to take no more than 2 grams of tylenol in 24 hours and no products containing NSAIDs, benzodiazapines, and narcotics  -She was also instructed to avoid raw shellfish   -She should participate in daily exercise as to prevent loss of muscle mass  -She should abstain from all alcohol intake and was counseled on this     -She is at risk for vitamin deficiencies and metabolic bone disease  -HAV and HBV immunity will be checked and she should be vaccinated through her primary care provider if she is not immune   -Additionally, she should receive a yearly flu shot and the pneumonia vaccine through her primary care provider  Tam Smart MD  Division of Gastroenterology and Hepatology  [address] [place] [address]    ============================================================================  CC/HPI: 68 y o  female with history of DM, CKD, CVA, HFpEF, AS, class III obesity, wheel-chair dependence of KRUEGER cirrhosis who presents for follow up  She was last seen in March 2023       Interval events  - She had an MRI hepatoma protocol in May which showed showed 4 8 cm arterial enhancing lesion in segment 8 and 3 7 cm LR-5 lesion in segment 6    Extended liver history  She was diagnosed with KRUEGER cirrhosis in 2020 when she decompensated with ascites requiring LVP  She was started on diuretics and had an EGD which showed trace esophageal varices  She was last seen in GI clinic in April 2022 where was recommended that she update her [facility]  surveillance which was last performed in September 2021       She was recently admitted to [facility] for new hypoxia in the context of volume overload and new severe aortic stenosis    She was seen by cardiology who recommended conservative management given her advanced age and comorbidities  She had a CT chest without contrast which showed two indeterminate lesions in the right lobe, with the largest measuring 3 1 cm, as well as mediastinal lymphadenopathy        ROS: Complete review of systems otherwise negative       PAST MEDICAL/SURGICAL HISTORY:  Past Medical History:   Diagnosis Date   • Anemia    • AV block    • [facility] 10/2/2013   • Chronic kidney disease    • Chronic pain disorder    • Cirrhosis of liver (HCC)    • CVA (cerebral vascular accident) ([facility] )    • Depression    • Diabetes mellitus ([place] [address] )    • Hearing loss    • Hemiparesis due to old cerebrovascular accident ([facility] )    • Hyperlipidemia    • Neuropathy    • Stenosis of carotid artery    • Stroke [facility])         Past Surgical History:   Procedure Laterality Date   • CARDIAC PACEMAKER PLACEMENT     • IR PARACENTESIS  12/24/2020   • JOINT REPLACEMENT      right knee       FAMILY/SOCIAL HISTORY:  Family History   Problem Relation Age of Onset   • No Known Problems Mother    • No Known Problems Father        Social History     Tobacco Use   • Smoking status: Former   • Smokeless tobacco: Never   Vaping Use   • Vaping Use: Never used   Substance Use Topics   • Alcohol use: Not Currently   • Drug use: Never       MEDICATIONS:  Current Outpatient Medications on File Prior to Visit   Medication Sig Dispense Refill   • acetaminophen (TYLENOL) 325 mg tablet Take 650 mg by mouth every 4 (four) hours as needed     • ascorbic acid (VITAMIN C) 500 MG tablet Take 500 mg by mouth daily     • aspirin (ECOTRIN LOW STRENGTH) 81 mg EC tablet Take 81 mg by mouth daily     • bisacodyl (FLEET) 10 MG/30ML ENEM Insert 10 mg into the rectum if needed for constipation     • cholecalciferol (VITAMIN D3) 1,000 units tablet Take 2,000 Units by mouth daily     • dextran 70-hypromellose (Artificial Tears) 0 1-0 3 % ophthalmic solution 1 drop every 3 (three) hours as needed       • Diclofenac Sodium (VOLTAREN) 1 % Apply 1 g topically 2 (two) times a day as needed     • DropSafe Safety Pen Needles 31G X 6 MM MISC      • escitalopram (LEXAPRO) 10 mg tablet Take 10 mg by mouth daily     • ferrous sulfate 325 (65 Fe) mg tablet Take 325 mg by mouth daily     • furosemide (LASIX) 20 mg tablet Take 3 tablets (60 mg total) by mouth daily Do not start before March 8, 2023   0   • gabapentin (NEURONTIN) 300 mg capsule Take 300 mg by mouth 2 (two) times a day     • glucagon 1 MG injection Inject 1 mg into a muscle once as needed     • insulin lispro (HumaLOG) 100 units/mL injection Inject 52 Units under the skin 3 (three) times a day with meals  0   • Melatonin 3 MG CAPS Take 3 mg by mouth daily at bedtime     • melatonin 3 mg      • metoprolol tartrate (LOPRESSOR) 50 mg tablet Take 75 mg by mouth every 12 (twelve) hours     • miconazole 2 % cream Apply 2 g topically 2 (two) times a day as needed     • NovoLOG 100 UNIT/ML injection Inject 100 mL under the skin in the morning     • omeprazole (PriLOSEC) 40 MG capsule      • polyethylene glycol (MIRALAX) 17 g packet Take 17 g by mouth daily as needed     • senna (SENOKOT) 8 6 mg      • silver sulfadiazine (SILVADENE,SSD) 1 % cream      • spironolactone (ALDACTONE) 25 mg tablet Take 1 tablet (25 mg total) by mouth daily Do not start before March 8, 2023   0   • tamsulosin (FLOMAX) 0 4 mg Take 2 capsules by mouth daily     • Cholecalciferol (Vitamin D) 50 MCG (2000 UT) tablet Take 1 tablet by mouth in the morning (Patient not taking: Reported on 4/19/2023)     • fluconazole (DIFLUCAN) 150 mg tablet Take 150 mg by mouth in the morning (Patient not taking: Reported on 4/19/2023)     • insulin glargine (LANTUS) 100 units/mL subcutaneous injection Inject 38 Units under the skin every 12 (twelve) hours (Patient taking differently: Inject 20 Units under the skin daily at bedtime) 10 mL 0   • polyvinyl alcohol (LIQUIFILM TEARS) 1 4 % ophthalmic solution Administer 1 drop to both eyes as needed   (Patient not taking: Reported on 4/19/2023)     • Senna S 8 6-50 MG per tablet  (Patient not taking: Reported on 4/19/2023)       No current facility-administered medications on file prior to visit  No Known Allergies    PHYSICAL EXAM:  /70 (BP Location: Right arm, Patient Position: Sitting, Cuff Size: Standard)   GENERAL: NAD, AAO  HEENT: anicteric, OP clear, MMM  ABDOMEN: S/ND/NT, normoactive BS, no hepatomegaly, spleen not palpable  EXTREMITIES: no edema  SKIN: no rashes, no palmar erythema, no spider angiomata   NEURO: normal gait, no tremor, no asterixis     LABS/RADIOLOGY/ENDOSCOPY:  Lab Results   Component Value Date    ALKPHOS 58 03/03/2023    ALT 11 03/03/2023    AST 20 03/03/2023    BUN 48 (H) 03/07/2023    CALCIUM 9 0 03/07/2023    CL 99 03/07/2023    CO2 27 03/07/2023    CREATININE 2 02 (H) 03/07/2023    EGFR 23 03/07/2023    HCT 24 8 (L) 03/07/2023    HDL 24 (L) 03/03/2023    HGB 7 5 (L) 03/07/2023    INR 1 34 (H) 03/03/2023    K 4 1 03/07/2023    PLT 79 (L) 03/07/2023    PTT 28 03/03/2023    TRIG 257 (H) 03/03/2023    WBC 4 89 03/07/2023     AFP wnl 3/2023    MRI abdomen with and without contrast (5/23/2023)  Motion degraded study  Cirrhosis  Splenomegaly  Mild ascites  At least 1 right hepatic lobe mass in segment 6, Li RADS 5, measuring 3 7 cm  Suspected additional similar mass in the hepatic dome in segment 8, 4 8 cm, not characterized due to respirations, without obvious washout  Mild hourglass narrowing of the mid CBD  No obvious mass but evaluation is limited by motion  For the latter 2 studies, consider contrast-enhanced triphasic CT of the abdomen for better breath-hold  Small lipid rich left adrenal adenoma  Other findings, as per the body of the report      CT chest (3/4/2023)  1  Diffuse pulmonary opacities favored to represent pulmonary edema  Multifocal pneumonia could have a similar appearance  2   Mediastinal lymphadenopathy  3    Indeterminate hepatic lesions, may represent metastasis, recommend nonemergent multiphase CT or MRI  4    Splenomegaly       MELD 3 0: 16 at 3/5/2023  4:59 AM  MELD-Na: 15 at 3/5/2023  4:59 AM  Calculated from:  Serum Creatinine: 1 81 mg/dL at 3/5/2023  4:59 AM  Serum Sodium: 138 mmol/L (Using max of 137 mmol/L) at 3/5/2023  4:59 AM  Total Bilirubin: 0 59 mg/dL (Using min of 1 mg/dL) at 3/3/2023  7:18 PM  Serum Albumin: 3 8 g/dL (Using max of 3 5 g/dL) at 3/3/2023  7:18 PM  INR(ratio): 1 34 at 3/3/2023 10:13 PM  Age at listing (hypothetical): 68 years  Sex: Female at 3/5/2023  4:59 AM

## 2023-06-08 NOTE — TELEPHONE ENCOUNTER
Spoke to patient's nurse [facility]  Lab orders faxed as requested PT/ inr, CMP, CBC, AFP

## 2023-06-09 ENCOUNTER — APPOINTMENT (EMERGENCY)
Dept: RADIOLOGY | Facility: HOSPITAL | Age: 78
End: 2023-06-09
Payer: COMMERCIAL

## 2023-06-09 ENCOUNTER — HOSPITAL ENCOUNTER (EMERGENCY)
Facility: HOSPITAL | Age: 78
Discharge: HOME | End: 2023-06-09
Attending: EMERGENCY MEDICINE
Payer: COMMERCIAL

## 2023-06-09 VITALS
OXYGEN SATURATION: 99 % | RESPIRATION RATE: 18 BRPM | TEMPERATURE: 98.1 F | DIASTOLIC BLOOD PRESSURE: 60 MMHG | WEIGHT: 223.11 LBS | HEART RATE: 60 BPM | SYSTOLIC BLOOD PRESSURE: 128 MMHG | BODY MASS INDEX: 42.16 KG/M2

## 2023-06-09 DIAGNOSIS — M19.90 ARTHRITIS: Primary | ICD-10-CM

## 2023-06-09 LAB
ALBUMIN SERPL BCP-MCNC: 3.6 G/DL (ref 3.5–5)
ALP SERPL-CCNC: 77 U/L (ref 34–104)
ALT SERPL W P-5'-P-CCNC: 10 U/L (ref 7–52)
ANION GAP SERPL CALCULATED.3IONS-SCNC: 7 MMOL/L (ref 4–13)
AST SERPL W P-5'-P-CCNC: 17 U/L (ref 13–39)
BASOPHILS # BLD AUTO: 0.02 THOUSANDS/ÂΜL (ref 0–0.1)
BASOPHILS NFR BLD AUTO: 1 % (ref 0–1)
BILIRUB SERPL-MCNC: 0.49 MG/DL (ref 0.2–1)
BUN SERPL-MCNC: 38 MG/DL (ref 5–25)
CALCIUM SERPL-MCNC: 9.2 MG/DL (ref 8.4–10.2)
CHLORIDE SERPL-SCNC: 102 MMOL/L (ref 96–108)
CO2 SERPL-SCNC: 28 MMOL/L (ref 21–32)
CREAT SERPL-MCNC: 1.85 MG/DL (ref 0.6–1.3)
EOSINOPHIL # BLD AUTO: 0.07 THOUSAND/ÂΜL (ref 0–0.61)
EOSINOPHIL NFR BLD AUTO: 2 % (ref 0–6)
ERYTHROCYTE [DISTWIDTH] IN BLOOD BY AUTOMATED COUNT: 16.4 % (ref 11.6–15.1)
GFR SERPL CREATININE-BSD FRML MDRD: 25 ML/MIN/1.73SQ M
GLUCOSE SERPL-MCNC: 172 MG/DL (ref 65–140)
HCT VFR BLD AUTO: 24.9 % (ref 34.8–46.1)
HGB BLD-MCNC: 7.5 G/DL (ref 11.5–15.4)
IMM GRANULOCYTES # BLD AUTO: 0.02 THOUSAND/UL (ref 0–0.2)
IMM GRANULOCYTES NFR BLD AUTO: 1 % (ref 0–2)
LYMPHOCYTES # BLD AUTO: 0.64 THOUSANDS/ÂΜL (ref 0.6–4.47)
LYMPHOCYTES NFR BLD AUTO: 16 % (ref 14–44)
MCH RBC QN AUTO: 28 PG (ref 26.8–34.3)
MCHC RBC AUTO-ENTMCNC: 30.1 G/DL (ref 31.4–37.4)
MCV RBC AUTO: 93 FL (ref 82–98)
MONOCYTES # BLD AUTO: 0.21 THOUSAND/ÂΜL (ref 0.17–1.22)
MONOCYTES NFR BLD AUTO: 5 % (ref 4–12)
NEUTROPHILS # BLD AUTO: 2.99 THOUSANDS/ÂΜL (ref 1.85–7.62)
NEUTS SEG NFR BLD AUTO: 75 % (ref 43–75)
NRBC BLD AUTO-RTO: 0 /100 WBCS
PLATELET # BLD AUTO: 96 THOUSANDS/UL (ref 149–390)
PMV BLD AUTO: 10.8 FL (ref 8.9–12.7)
POTASSIUM SERPL-SCNC: 4.4 MMOL/L (ref 3.5–5.3)
PROT SERPL-MCNC: 7.2 G/DL (ref 6.4–8.4)
RBC # BLD AUTO: 2.68 MILLION/UL (ref 3.81–5.12)
SODIUM SERPL-SCNC: 137 MMOL/L (ref 135–147)
WBC # BLD AUTO: 3.95 THOUSAND/UL (ref 4.31–10.16)

## 2023-06-09 PROCEDURE — 73130 X-RAY EXAM OF HAND: CPT

## 2023-06-09 PROCEDURE — 80053 COMPREHEN METABOLIC PANEL: CPT | Performed by: EMERGENCY MEDICINE

## 2023-06-09 PROCEDURE — 36415 COLL VENOUS BLD VENIPUNCTURE: CPT

## 2023-06-09 PROCEDURE — 99284 EMERGENCY DEPT VISIT MOD MDM: CPT

## 2023-06-09 PROCEDURE — 85025 COMPLETE CBC W/AUTO DIFF WBC: CPT | Performed by: EMERGENCY MEDICINE

## 2023-06-09 PROCEDURE — 73620 X-RAY EXAM OF FOOT: CPT

## 2023-06-09 RX ORDER — BISACODYL 10 MG
10 SUPPOSITORY, RECTAL RECTAL AS NEEDED
COMMUNITY

## 2023-06-09 NOTE — ED PROVIDER NOTES
"History  Chief Complaint   Patient presents with   • Wrist Pain     Patient brought by EMS reports left wrist pain since March  • Abnormal Lab     Hemoglobin 6 5     69 yo F with PMHx DM, CKD, CVA with resulting R LE weakness, presents for evaluation for \"low hemoglobin levels\" per her PCP's office  Pt's daughter who is pt's POA reports that she received a call this AM reporting that pt's hemoglobin level was \"6 5\" and was advised to present to the ED  Pt has recently been c/o L hand and L foot pain and has been less mobile due to L foot pain  No recent falls or traumatic injuries  Otherwise, daughters at bedside report pt has been at her baseline without any other known complaints  Pt denies any other concerns at this time  Prior to Admission Medications   Prescriptions Last Dose Informant Patient Reported? Taking?    Cholecalciferol (Vitamin D) 50 MCG (2000 UT) tablet  Outside Facility (Specify) Yes Yes   Sig: Take 1 tablet by mouth in the morning   Diclofenac Sodium (VOLTAREN) 1 %  Outside Facility (Specify) Yes Yes   Sig: Apply 1 g topically 2 (two) times a day as needed   DropSafe Safety Pen Needles 31G X 6 MM MISC  Outside Facility (Specify) Yes No   Senna S 8 6-50 MG per tablet  Outside Facility (Specify) Yes Yes   acetaminophen (TYLENOL) 325 mg tablet  Outside Facility (Specify) Yes Yes   Sig: Take 650 mg by mouth every 4 (four) hours as needed   ascorbic acid (VITAMIN C) 500 MG tablet  Outside Facility (Specify) Yes Yes   Sig: Take 500 mg by mouth daily   aspirin (ECOTRIN LOW STRENGTH) 81 mg EC tablet  Outside Facility (Specify) Yes Yes   Sig: Take 81 mg by mouth daily   bisacodyl (DULCOLAX) 10 mg suppository   Yes Yes   Sig: Insert 10 mg into the rectum if needed for constipation   cholecalciferol (VITAMIN D3) 1,000 units tablet  Outside Facility (Specify) Yes Yes   Sig: Take 2,000 Units by mouth daily   dextran 70-hypromellose (Artificial Tears) 0 1-0 3 % ophthalmic solution  Outside Facility " (Specify) Yes Yes   Si drop every 3 (three) hours as needed     escitalopram (LEXAPRO) 10 mg tablet  Outside Facility (Specify) Yes Yes   Sig: Take 10 mg by mouth daily   ferrous sulfate 325 (65 Fe) mg tablet  Outside Facility (Specify) Yes Yes   Sig: Take 325 mg by mouth daily   furosemide (LASIX) 20 mg tablet  Outside Facility (Specify) No Yes   Sig: Take 3 tablets (60 mg total) by mouth daily Do not start before 2023     Patient taking differently: Take 40 mg by mouth daily   gabapentin (NEURONTIN) 300 mg capsule  Outside Facility (Specify) Yes Yes   Sig: Take 300 mg by mouth 2 (two) times a day   glucagon 1 MG injection  Outside Facility (Specify) Yes Yes   Sig: Inject 1 mg into a muscle once as needed   insulin glargine (LANTUS) 100 units/mL subcutaneous injection  Outside Facility (Specify) No Yes   Sig: Inject 38 Units under the skin every 12 (twelve) hours   Patient taking differently: Inject 30 Units under the skin daily at bedtime   insulin lispro (HumaLOG) 100 units/mL injection  Outside Facility (Specify) No Yes   Sig: Inject 52 Units under the skin 3 (three) times a day with meals   Patient taking differently: Inject 50 Units under the skin 3 (three) times a day with meals   melatonin 3 mg   Yes Yes   metoprolol tartrate (LOPRESSOR) 50 mg tablet  Outside Facility (Specify) Yes Yes   Sig: Take 75 mg by mouth every 12 (twelve) hours   miconazole 2 % cream  Outside Facility (Specify) Yes Yes   Sig: Apply 2 g topically 2 (two) times a day as needed   omeprazole (PriLOSEC) 40 MG capsule  Outside Facility (Specify) Yes Yes   polyethylene glycol (MIRALAX) 17 g packet  Outside Facility (Specify) Yes Yes   Sig: Take 17 g by mouth daily as needed   polyvinyl alcohol (LIQUIFILM TEARS) 1 4 % ophthalmic solution  Outside Facility (Specify) Yes No   Sig: Administer 1 drop to both eyes as needed     Patient not taking: Reported on 2023   silver sulfadiazine (SILVADENE,SSD) 1 % cream   Yes Yes spironolactone (ALDACTONE) 25 mg tablet  Outside Facility (Specify) No Yes   Sig: Take 1 tablet (25 mg total) by mouth daily Do not start before March 8, 2023  tamsulosin (FLOMAX) 0 4 mg  Outside Facility ([address] [place] [address]) Yes Yes   Sig: Take 2 capsules by mouth daily      Facility-Administered Medications: None       Past Medical History:   Diagnosis Date   • Anemia    • AV block    • Benign neoplasm of pituitary gland (HCC) 10/2/2013   • Chronic kidney disease    • Chronic pain disorder    • Cirrhosis of liver (HCC)    • CVA (cerebral vascular accident) ([facility] [address] )    • Depression    • Diabetes mellitus ([facility] )    • Hearing loss    • Hemiparesis due to old cerebrovascular accident [facility])    • Hyperlipidemia    • Neuropathy    • Stenosis of carotid artery    • Stroke [facility])        Past Surgical History:   Procedure Laterality Date   • CARDIAC PACEMAKER PLACEMENT     • IR PARACENTESIS  12/24/2020   • JOINT REPLACEMENT      right knee       Family History   Problem Relation Age of Onset   • No Known Problems Mother    • No Known Problems Father      I have reviewed and agree with the history as documented  E-Cigarette/Vaping   • E-Cigarette Use Never User      E-Cigarette/Vaping Substances   • Nicotine No    • THC No    • CBD No    • Flavoring No    • Other No    • Unknown No      Social History     Tobacco Use   • Smoking status: Former   • Smokeless tobacco: Never   Vaping Use   • Vaping Use: Never used   Substance Use Topics   • Alcohol use: Not Currently   • Drug use: Never        Review of Systems   Constitutional: Negative for chills and fever  HENT: Negative for ear pain and sore throat  Eyes: Negative for pain and visual disturbance  Respiratory: Negative for cough and shortness of breath  Cardiovascular: Negative for chest pain and palpitations  Gastrointestinal: Negative for abdominal pain and vomiting  Genitourinary: Negative for dysuria and hematuria  Musculoskeletal: Positive for arthralgias  Negative for back pain  Skin: Negative for color change and rash  Neurological: Negative for seizures and syncope  All other systems reviewed and are negative  Physical Exam  ED Triage Vitals [06/09/23 1247]   Temperature Pulse Respirations Blood Pressure SpO2   98 1 °F (36 7 °C) 60 18 (!) 96/49 98 %      Temp Source Heart Rate Source Patient Position - Orthostatic VS BP Location FiO2 (%)   Oral Monitor Lying Right arm --      Pain Score       8             Orthostatic Vital Signs  Vitals:    06/09/23 1330 06/09/23 1400 06/09/23 1430 06/09/23 1500   BP: 105/53 110/56 114/54 128/60   Pulse: 60 60 60 60   Patient Position - Orthostatic VS:    Lying       Physical Exam  Vitals and nursing note reviewed  Constitutional:       General: She is not in acute distress  Appearance: Normal appearance  She is well-developed  She is not ill-appearing, toxic-appearing or diaphoretic  HENT:      Head: Normocephalic and atraumatic  Right Ear: External ear normal       Left Ear: External ear normal       Ears:      Comments: Very hard of hearing     Nose: Nose normal       Mouth/Throat:      Mouth: Mucous membranes are moist    Eyes:      Extraocular Movements: Extraocular movements intact  Conjunctiva/sclera: Conjunctivae normal    Cardiovascular:      Rate and Rhythm: Normal rate and regular rhythm  Pulses: Normal pulses  Heart sounds: Murmur heard  Comments: 4/6 systolic murmur  Pulmonary:      Effort: Pulmonary effort is normal  No respiratory distress  Breath sounds: Normal breath sounds  Abdominal:      General: Abdomen is flat  Palpations: Abdomen is soft  Tenderness: There is no abdominal tenderness  There is no guarding or rebound  Musculoskeletal:         General: Tenderness present  No swelling  Normal range of motion  Cervical back: Normal range of motion and neck supple        Comments: TTP over the L hand specifically over the L 1st MCP without overlying skin changes  Neurovascularly intact  TTP over the base of the 5th metatarsal without overlying edema or skin changes  Skin:     General: Skin is warm and dry  Capillary Refill: Capillary refill takes less than 2 seconds  Neurological:      Mental Status: She is alert  Mental status is at baseline     Psychiatric:         Mood and Affect: Mood normal          Behavior: Behavior normal          ED Medications  Medications - No data to display    Diagnostic Studies  Results Reviewed     Procedure Component Value Units Date/Time    CBC and differential [[id number]]  (Abnormal) Collected: 06/09/23 1333    Lab Status: Final result Specimen: Blood from Arm, Left Updated: 06/09/23 1423     WBC 3 95 Thousand/uL      RBC 2 68 Million/uL      Hemoglobin 7 5 g/dL      Hematocrit 24 9 %      MCV 93 fL      MCH 28 0 pg      MCHC 30 1 g/dL      RDW 16 4 %      MPV 10 8 fL      Platelets 96 Thousands/uL      nRBC 0 /100 WBCs      Neutrophils Relative 75 %      Immat GRANS % 1 %      Lymphocytes Relative 16 %      Monocytes Relative 5 %      Eosinophils Relative 2 %      Basophils Relative 1 %      Neutrophils Absolute 2 99 Thousands/µL      Immature Grans Absolute 0 02 Thousand/uL      Lymphocytes Absolute 0 64 Thousands/µL      Monocytes Absolute 0 21 Thousand/µL      Eosinophils Absolute 0 07 Thousand/µL      Basophils Absolute 0 02 Thousands/µL     Comprehensive metabolic panel [[id number]]  (Abnormal) Collected: 06/09/23 1333    Lab Status: Final result Specimen: Blood from Arm, Left Updated: 06/09/23 1414     Sodium 137 mmol/L      Potassium 4 4 mmol/L      Chloride 102 mmol/L      CO2 28 mmol/L      ANION GAP 7 mmol/L      BUN 38 mg/dL      Creatinine 1 85 mg/dL      Glucose 172 mg/dL      Calcium 9 2 mg/dL      AST 17 U/L      ALT 10 U/L      Alkaline Phosphatase 77 U/L      Total Protein 7 2 g/dL      Albumin 3 6 g/dL      Total Bilirubin 0 49 mg/dL      eGFR 25 ml/min/1 73sq m     Narrative: Jose F guidelines for Chronic Kidney Disease (CKD):   •  Stage 1 with normal or high GFR (GFR > 90 mL/min/1 73 square meters)  •  Stage 2 Mild CKD (GFR = 60-89 mL/min/1 73 square meters)  •  Stage 3A Moderate CKD (GFR = 45-59 mL/min/1 73 square meters)  •  Stage 3B Moderate CKD (GFR = 30-44 mL/min/1 73 square meters)  •  Stage 4 Severe CKD (GFR = 15-29 mL/min/1 73 square meters)  •  Stage 5 End Stage CKD (GFR <15 mL/min/1 73 square meters)  Note: GFR calculation is accurate only with a steady state creatinine                 XR foot 2 views LEFT   ED Interpretation by Shirin Espinosa MD (06/09 1638)   Arthritic changes, no acute process      XR hand 3+ views LEFT   ED Interpretation by Shirin Espinosa MD (06/09 1638)   Arthritic changes, no acute process            Procedures  Procedures      ED Course  ED Course as of 06/09/23 1703   Fri Jun 09, 2023   1612 Creatinine(!): 1 85  Near baseline, improved from 2 02 3/2023   1612 Hemoglobin(!): 7 5  Near baseline, 7 5 3 months ago                             SBIRT 22yo+    Flowsheet Row Most Recent Value   Initial Alcohol Screen: US AUDIT-C     1  How often do you have a drink containing alcohol? 0 Filed at: 06/09/2023 1253   2  How many drinks containing alcohol do you have on a typical day you are drinking? 0 Filed at: 06/09/2023 1253   3a  Male UNDER 65: How often do you have five or more drinks on one occasion? 0 Filed at: 06/09/2023 1253   3b  FEMALE Any Age, or MALE 65+: How often do you have 4 or more drinks on one occassion? 0 Filed at: 06/09/2023 1253   Audit-C Score 0 Filed at: 06/09/2023 1253   TENA: How many times in the past year have you    Used an illegal drug or used a prescription medication for non-medical reasons? Never Filed at: 06/09/2023 1253                Medical Decision Making  Pt remained stable throughout ED course   Given c/o L hand and L foot pain, XR obtained which showed arthritic changes without acute abnormalities  Pt was offered Tylenol, however she declined  NSAIDs and Voltaren not provided 2/2 h/o CKD  CBC, BMP baseline today  Hgb baseline at 7 5  After speaking with family- the value that was reported over the phone was pt's HgbA1C at 6 3 which was recently obtained- clearly there was some miscommunication regarding urgent need for pt's presentation to the ED  Stable for d/c back to [place]  Pt understands and agreed with plan  All questions answered  No other concerns  Amount and/or Complexity of Data Reviewed  Labs: ordered  Decision-making details documented in ED Course  Radiology: ordered and independent interpretation performed  Disposition  Final diagnoses:   Arthritis     Time reflects when diagnosis was documented in both MDM as applicable and the Disposition within this note     Time User Action Codes Description Comment    6/9/2023  4:39 PM Bahman Miller Add [M19 90] Arthritis       ED Disposition     ED Disposition   Discharge    Condition   Stable    Date/Time   Fri Jun 9, 2023  4:38 PM    Comment   Madelyn Steiner discharge to home/self care  Follow-up Information     Follow up With Specialties Details Why Contact Info Additional Information    Buzz Quinteros MD Internal Medicine Call  As needed [phone]  [place] [address] Emergency Department Emergency Medicine Go to  As needed, If symptoms worsen such as difficulty breathing, high fevers, chest pain, worsening abdominal pain or any other new or worsening concerns  [address] Emergency Department, [address], [place], [place], [address]          Patient's Medications   Discharge Prescriptions    No medications on file     No discharge procedures on file      PDMP Review     None           ED Provider  Attending physically available and evaluated Jessica Lopez I managed the patient along with the ED Attending      Electronically Signed by         Josy De La Rosa MD  06/09/23 5333

## 2023-06-14 NOTE — ED ATTENDING ATTESTATION
6/9/2023  IBethanie MD, saw and evaluated the patient  I have discussed the patient with the resident/non-physician practitioner and agree with the resident's/non-physician practitioner's findings, Plan of Care, and MDM as documented in the resident's/non-physician practitioner's note, except where noted  All available labs and Radiology studies were reviewed  I was present for key portions of any procedure(s) performed by the resident/non-physician practitioner and I was immediately available to provide assistance  At this point I agree with the current assessment done in the Emergency Department  I have conducted an independent evaluation of this patient a history and physical is as follows:    59-year-old female presents to the emergency department by EMS from her nursing residence  Staff were notified that her hemoglobin was quite low in the sixes range and centering for this reason  No chest discomfort, dyspnea or lightheadedness  She has had chronic and slightly worsened discomfort in the left hand/wrist   No trauma, appreciated skin changes or deformity  Movement is limited secondary to pain  She does additionally have chronic left lower extremity discomfort which has intensified without appreciated skin changes or history of trauma  Review of outpatient labs undertaken  No CBC was checked today  A hemoglobin A1c however was and level was in the low sixes  She has not appreciated any recent abnormal bleeding/increased bruising  Repeat blood work performed  Hemoglobin checked  Although low it was in similar range as not on prior testing and above the 6 level of her hemoglobin A1c  Degenerative changes highly suspected for foot and hand discomfort  X-rays performed and interpreted by me without evidence of acute fracture  Chronic degenerative changes noted on both  This was discussed with patient/family who follows closely with outpatient providers      ED Course         Critical Care Time  Procedures

## 2023-06-26 ENCOUNTER — DOCUMENTATION (OUTPATIENT)
Dept: HEMATOLOGY ONCOLOGY | Facility: CLINIC | Age: 78
End: 2023-06-26

## 2023-06-26 NOTE — PROGRESS NOTES
Chart reviewed in preparation of Upper GI Tumor Conference presentation by Dr Lorena Conway  Patient established care with Hepatology for liver lesions

## 2023-06-29 ENCOUNTER — HOSPITAL ENCOUNTER (EMERGENCY)
Facility: HOSPITAL | Age: 78
Discharge: HOME | End: 2023-06-29
Attending: EMERGENCY MEDICINE
Payer: COMMERCIAL

## 2023-06-29 ENCOUNTER — DOCUMENTATION (OUTPATIENT)
Dept: HEMATOLOGY ONCOLOGY | Facility: CLINIC | Age: 78
End: 2023-06-29

## 2023-06-29 VITALS
HEART RATE: 60 BPM | WEIGHT: 221.34 LBS | SYSTOLIC BLOOD PRESSURE: 101 MMHG | OXYGEN SATURATION: 97 % | BODY MASS INDEX: 41.79 KG/M2 | RESPIRATION RATE: 16 BRPM | HEIGHT: 61 IN | TEMPERATURE: 97.6 F | DIASTOLIC BLOOD PRESSURE: 49 MMHG

## 2023-06-29 DIAGNOSIS — K76.9 HEPATIC LESION: Primary | ICD-10-CM

## 2023-06-29 DIAGNOSIS — D63.8 ANEMIA OF CHRONIC DISEASE: Primary | ICD-10-CM

## 2023-06-29 LAB
ABO GROUP BLD: NORMAL
ABO GROUP BLD: NORMAL
ATRIAL RATE: 61 BPM
BASOPHILS # BLD AUTO: 0.01 THOUSANDS/ÂΜL (ref 0–0.1)
BASOPHILS NFR BLD AUTO: 0 % (ref 0–1)
BLD GP AB SCN SERPL QL: NEGATIVE
EOSINOPHIL # BLD AUTO: 0.08 THOUSAND/ÂΜL (ref 0–0.61)
EOSINOPHIL NFR BLD AUTO: 2 % (ref 0–6)
ERYTHROCYTE [DISTWIDTH] IN BLOOD BY AUTOMATED COUNT: 16.4 % (ref 11.6–15.1)
HCT VFR BLD AUTO: 24.4 % (ref 34.8–46.1)
HGB BLD-MCNC: 7.3 G/DL (ref 11.5–15.4)
IMM GRANULOCYTES # BLD AUTO: 0.03 THOUSAND/UL (ref 0–0.2)
IMM GRANULOCYTES NFR BLD AUTO: 1 % (ref 0–2)
LYMPHOCYTES # BLD AUTO: 0.68 THOUSANDS/ÂΜL (ref 0.6–4.47)
LYMPHOCYTES NFR BLD AUTO: 17 % (ref 14–44)
MCH RBC QN AUTO: 26.7 PG (ref 26.8–34.3)
MCHC RBC AUTO-ENTMCNC: 29.9 G/DL (ref 31.4–37.4)
MCV RBC AUTO: 89 FL (ref 82–98)
MONOCYTES # BLD AUTO: 0.26 THOUSAND/ÂΜL (ref 0.17–1.22)
MONOCYTES NFR BLD AUTO: 6 % (ref 4–12)
NEUTROPHILS # BLD AUTO: 2.98 THOUSANDS/ÂΜL (ref 1.85–7.62)
NEUTS SEG NFR BLD AUTO: 74 % (ref 43–75)
NRBC BLD AUTO-RTO: 0 /100 WBCS
P AXIS: 65 DEGREES
PLATELET # BLD AUTO: 90 THOUSANDS/UL (ref 149–390)
PMV BLD AUTO: 10 FL (ref 8.9–12.7)
PR INTERVAL: 236 MS
QRS AXIS: -73 DEGREES
QRSD INTERVAL: 204 MS
QT INTERVAL: 532 MS
QTC INTERVAL: 535 MS
RBC # BLD AUTO: 2.73 MILLION/UL (ref 3.81–5.12)
RH BLD: NEGATIVE
RH BLD: NEGATIVE
SPECIMEN EXPIRATION DATE: NORMAL
T WAVE AXIS: 91 DEGREES
VENTRICULAR RATE: 61 BPM
WBC # BLD AUTO: 4.04 THOUSAND/UL (ref 4.31–10.16)

## 2023-06-29 PROCEDURE — 86850 RBC ANTIBODY SCREEN: CPT | Performed by: EMERGENCY MEDICINE

## 2023-06-29 PROCEDURE — 86900 BLOOD TYPING SEROLOGIC ABO: CPT | Performed by: EMERGENCY MEDICINE

## 2023-06-29 PROCEDURE — 99284 EMERGENCY DEPT VISIT MOD MDM: CPT | Performed by: EMERGENCY MEDICINE

## 2023-06-29 PROCEDURE — 99284 EMERGENCY DEPT VISIT MOD MDM: CPT

## 2023-06-29 PROCEDURE — 36415 COLL VENOUS BLD VENIPUNCTURE: CPT | Performed by: EMERGENCY MEDICINE

## 2023-06-29 PROCEDURE — 86901 BLOOD TYPING SEROLOGIC RH(D): CPT | Performed by: EMERGENCY MEDICINE

## 2023-06-29 PROCEDURE — 85025 COMPLETE CBC W/AUTO DIFF WBC: CPT | Performed by: EMERGENCY MEDICINE

## 2023-06-29 PROCEDURE — 93005 ELECTROCARDIOGRAM TRACING: CPT

## 2023-06-29 NOTE — DISCHARGE INSTRUCTIONS
Patient has a baseline hemoglobin of 7.5. His hemoglobin today is 7.3 not transfuse unless actively bleeding or hemoglobin is below 7.

## 2023-06-29 NOTE — ED PROVIDER NOTES
History  Chief Complaint   Patient presents with   • Abnormal Lab     Pt here by ems from [facility] d/t low hemoglobin that was drawn this morning (107)     61-year-old female sent in for hemoglobin of 6.7. Patient has a hemoglobin of 7.5 at baseline. Patient has no complaint and has no active bleeding. History provided by:  Medical records, nursing home and EMS personnel  History limited by:  Dementia   used: No    Evaluation of Abnormal Diagnostic Test  Time since result:  Day  Patient referred by:  Nursing home  Resulting agency:  External  Result type: hematology    Hematology:     Hematocrit:  Low    Hemoglobin:  Low    Other abnormal hematology result:  Hemoglobin 6.7      Prior to Admission Medications   Prescriptions Last Dose Informant Patient Reported? Taking?    Cholecalciferol (Vitamin D) 50 MCG ( UT) tablet  Outside Facility (Specify) Yes No   Sig: Take 1 tablet by mouth in the morning   Diclofenac Sodium (VOLTAREN) 1 %  Outside Facility (Specify) Yes No   Sig: Apply 1 g topically 2 (two) times a day as needed   DropSafe Safety Pen Needles 31G X 6 MM MISC  Outside Facility (Specify) Yes No   Senna S 8.6-50 MG per tablet  Outside Facility (Specify) Yes No   acetaminophen (TYLENOL) 325 mg tablet  Outside Facility (Specify) Yes No   Sig: Take 650 mg by mouth every 4 (four) hours as needed   ascorbic acid (VITAMIN C) 500 MG tablet  Outside Facility (Specify) Yes No   Sig: Take 500 mg by mouth daily   aspirin (ECOTRIN LOW STRENGTH) 81 mg EC tablet  Outside Facility (Specify) Yes No   Sig: Take 81 mg by mouth daily   bisacodyl (DULCOLAX) 10 mg suppository   Yes No   Sig: Insert 10 mg into the rectum if needed for constipation   cholecalciferol (VITAMIN D3) 1,000 units tablet  Outside Facility (Specify) Yes No   Sig: Take 2,000 Units by mouth daily   dextran 70-hypromellose (Artificial Tears) 0.1-0.3 % ophthalmic solution  Outside Facility (Specify) Yes No   Si drop every 3 (three) hours as needed     escitalopram (LEXAPRO) 10 mg tablet  Outside Facility (Specify) Yes No   Sig: Take 10 mg by mouth daily   ferrous sulfate 325 (65 Fe) mg tablet  Outside Facility (Specify) Yes No   Sig: Take 325 mg by mouth daily   furosemide (LASIX) 20 mg tablet  Outside Facility (Specify) No No   Sig: Take 3 tablets (60 mg total) by mouth daily Do not start before March 8, 2023.    Patient taking differently: Take 40 mg by mouth daily   gabapentin (NEURONTIN) 300 mg capsule  Outside Facility (Specify) Yes No   Sig: Take 300 mg by mouth 2 (two) times a day   glucagon 1 MG injection  Outside Facility (Specify) Yes No   Sig: Inject 1 mg into a muscle once as needed   insulin glargine (LANTUS) 100 units/mL subcutaneous injection  Outside Facility (Specify) No No   Sig: Inject 38 Units under the skin every 12 (twelve) hours   Patient taking differently: Inject 30 Units under the skin daily at bedtime   insulin lispro (HumaLOG) 100 units/mL injection  Outside Facility (Specify) No No   Sig: Inject 52 Units under the skin 3 (three) times a day with meals   Patient taking differently: Inject 50 Units under the skin 3 (three) times a day with meals   melatonin 3 mg   Yes No   metoprolol tartrate (LOPRESSOR) 50 mg tablet  Outside Facility (Specify) Yes No   Sig: Take 75 mg by mouth every 12 (twelve) hours   miconazole 2 % cream  Outside Facility (Specify) Yes No   Sig: Apply 2 g topically 2 (two) times a day as needed   omeprazole (PriLOSEC) 40 MG capsule  Outside Facility (Specify) Yes No   polyethylene glycol (MIRALAX) 17 g packet  Outside Facility (Specify) Yes No   Sig: Take 17 g by mouth daily as needed   polyvinyl alcohol (LIQUIFILM TEARS) 1.4 % ophthalmic solution  Outside Facility (Specify) Yes No   Sig: Administer 1 drop to both eyes as needed     Patient not taking: Reported on 4/19/2023   silver sulfadiazine (SILVADENE,SSD) 1 % cream   Yes No   spironolactone (ALDACTONE) 25 mg tablet  Outside Facility (Specify) No No   Sig: Take 1 tablet (25 mg total) by mouth daily Do not start before March 8, 2023. tamsulosin (FLOMAX) 0.4 mg  Outside Facility (Specify) Yes No   Sig: Take 2 capsules by mouth daily      Facility-Administered Medications: None       Past Medical History:   Diagnosis Date   • Anemia    • AV block    • Benign neoplasm of pituitary gland (HCC) 10/2/2013   • Chronic kidney disease    • Chronic pain disorder    • Cirrhosis of liver (HCC)    • CVA (cerebral vascular accident) ([address])    • Depression    • Diabetes mellitus ([address])    • Hearing loss    • Hemiparesis due to old cerebrovascular accident [facility])    • Hyperlipidemia    • Neuropathy    • Stenosis of carotid artery    • Stroke [facility])        Past Surgical History:   Procedure Laterality Date   • CARDIAC PACEMAKER PLACEMENT     • IR PARACENTESIS  12/24/2020   • JOINT REPLACEMENT      right knee       Family History   Problem Relation Age of Onset   • No Known Problems Mother    • No Known Problems Father      I have reviewed and agree with the history as documented. E-Cigarette/Vaping   • E-Cigarette Use Never User      E-Cigarette/Vaping Substances   • Nicotine No    • THC No    • CBD No    • Flavoring No    • Other No    • Unknown No      Social History     Tobacco Use   • Smoking status: Former   • Smokeless tobacco: Never   Vaping Use   • Vaping Use: Never used   Substance Use Topics   • Alcohol use: Not Currently   • Drug use: Never       Review of Systems   Unable to perform ROS: Dementia       Physical Exam  Physical Exam  Vitals and nursing note reviewed. Constitutional:       Appearance: Normal appearance. She is well-developed. She is not toxic-appearing. HENT:      Head: Normocephalic and atraumatic. Right Ear: Tympanic membrane and external ear normal.      Left Ear: Tympanic membrane and external ear normal.      Nose: Nose normal. No nasal deformity or rhinorrhea. Mouth/Throat:      Dentition: Normal dentition. Pharynx: Uvula midline. Eyes:      General: Lids are normal.         Right eye: No discharge. Left eye: No discharge. Conjunctiva/sclera: Conjunctivae normal.      Pupils: Pupils are equal, round, and reactive to light. Neck:      Vascular: No carotid bruit or JVD. Trachea: Trachea normal.   Cardiovascular:      Rate and Rhythm: Normal rate and regular rhythm. No extrasystoles are present. Chest Wall: PMI is not displaced. Pulses: Normal pulses. Pulmonary:      Effort: Pulmonary effort is normal. No accessory muscle usage or respiratory distress. Breath sounds: Normal breath sounds. No wheezing, rhonchi or rales. Abdominal:      General: Bowel sounds are normal.      Palpations: Abdomen is soft. Abdomen is not rigid. There is no mass. Tenderness: There is no abdominal tenderness. There is no guarding or rebound. Musculoskeletal:      Right shoulder: No swelling, deformity or bony tenderness. Normal range of motion. Cervical back: Normal range of motion and neck supple. No deformity, tenderness or bony tenderness. Lymphadenopathy:      Cervical: No cervical adenopathy. Skin:     General: Skin is warm and dry. Findings: No rash. Neurological:      Mental Status: She is alert. Mental status is at baseline. GCS: GCS eye subscore is 4. GCS verbal subscore is 4. GCS motor subscore is 6. Cranial Nerves: No cranial nerve deficit. Sensory: No sensory deficit. Deep Tendon Reflexes: Reflexes are normal and symmetric.    Psychiatric:         Speech: Speech normal.         Behavior: Behavior normal.         Vital Signs  ED Triage Vitals   Temperature Pulse Respirations Blood Pressure SpO2   06/29/23 1030 06/29/23 1031 06/29/23 1031 06/29/23 1031 06/29/23 1031   97.6 °F (36.4 °C) 62 16 115/55 99 %      Temp Source Heart Rate Source Patient Position - Orthostatic VS BP Location FiO2 (%)   06/29/23 1030 06/29/23 1031 06/29/23 1031 06/29/23 1031 -- Oral Monitor Lying Right arm       Pain Score       --                  Vitals:    06/29/23 1031 06/29/23 1100   BP: 115/55 (!) 101/49   Pulse: 62 60   Patient Position - Orthostatic VS: Lying          Visual Acuity      ED Medications  Medications - No data to display    Diagnostic Studies  Results Reviewed     Procedure Component Value Units Date/Time    CBC and differential [[id number]]  (Abnormal) Collected: 06/29/23 1038    Lab Status: Final result Specimen: Blood from Arm, Left Updated: 06/29/23 1131     WBC 4.04 Thousand/uL      RBC 2.73 Million/uL      Hemoglobin 7.3 g/dL      Hematocrit 24.4 %      MCV 89 fL      MCH 26.7 pg      MCHC 29.9 g/dL      RDW 16.4 %      MPV 10.0 fL      Platelets 90 Thousands/uL      nRBC 0 /100 WBCs      Neutrophils Relative 74 %      Immat GRANS % 1 %      Lymphocytes Relative 17 %      Monocytes Relative 6 %      Eosinophils Relative 2 %      Basophils Relative 0 %      Neutrophils Absolute 2.98 Thousands/µL      Immature Grans Absolute 0.03 Thousand/uL      Lymphocytes Absolute 0.68 Thousands/µL      Monocytes Absolute 0.26 Thousand/µL      Eosinophils Absolute 0.08 Thousand/µL      Basophils Absolute 0.01 Thousands/µL                  No orders to display              Procedures  Procedures         ED Course                                             Medical Decision Making  Differential diagnosis includes but is not limited to anemia of chronic disease, acute blood loss anemia iron deficiency anemia    Anemia of chronic disease: acute illness or injury  Amount and/or Complexity of Data Reviewed  Labs: ordered. Risk  Prescription drug management.     Risk Details: Patient continue all current medications and follow-up with hematology        Disposition  Final diagnoses:   Anemia of chronic disease     Time reflects when diagnosis was documented in both MDM as applicable and the Disposition within this note     Time User Action Codes Description Comment    6/29/2023 11:26 AM Chanel Christianson [place] [D63.8] Anemia of chronic disease       ED Disposition     ED Disposition   Discharge    Condition   Stable    Date/Time   Thu Jun 29, 2023 11:26 AM    Comment   Ana Laura Steiner discharge to home/self care. Follow-up Information     Follow up With Specialties Details Why Contact Info    Pamela Phelps MD Internal Medicine Schedule an appointment as soon as possible for a visit   [address] [facility]  [address]  [address] [facility]  [phone]            Patient's Medications   Discharge Prescriptions    No medications on file       No discharge procedures on file.     PDMP Review     None          ED Provider  Electronically Signed by           Trey Da Silva DO  06/29/23 1138

## 2023-06-29 NOTE — PROGRESS NOTES
RECTAL/GI MULTIDISCIPLINARY CASE REVIEW    DATE: 6/29/2023      PRESENTING DOCTOR: Dr Jayleen Thompson      DIAGNOSIS: Hepatocellular Carcinoma       Yoanakatiemayank Douglas was presented at the Rectal/GI Multidisciplinary Conference today  PHYSICIAN RECOMMENDED PLAN:    - The recommended plan is for patient to follow up with Interventional Radiology for evaluation for liver directed therapy  Team agreed to plan  The final treatment plan will be left to the discretion of the patient and the treating physician  DISCLAIMERS:  TO THE TREATING PHYSICIAN:  This conference is a meeting of clinicians from various specialty areas who evaluate and discuss patients for whom a multidisciplinary treatment approach is being considered  Please note that the above opinion was a consensus of the conference attendees and is intended only to assist in quality care of the discussed patient  The responsibility for follow up on the input given during the conference, along with any final decisions regarding plan of care, is that of the patient and the patient's provider  Accordingly, appointments have only been recommended based on this information and have NOT been scheduled unless otherwise noted  TO THE PATIENT:  This summary is a brief record of major aspects of your cancer treatment  You may choose to share a copy with any of your doctors or nurses  However, this is not a detailed or comprehensive record of your care        NCCN guidelines were readily available for review at this discussion

## 2023-06-30 ENCOUNTER — TELEPHONE (OUTPATIENT)
Dept: GASTROENTEROLOGY | Facility: CLINIC | Age: 78
End: 2023-06-30

## 2023-06-30 NOTE — TELEPHONE ENCOUNTER
Reviewed tumor board recommendations with her niece  Discussed that [facility]el Sons would be high risk for LDT and systemic therapy given her medical comorbidities and functional status but I did place a referral to review her options if she wanted to pursue treatment

## 2023-07-03 LAB
ATRIAL RATE: 61 BPM
P AXIS: 65 DEGREES
PR INTERVAL: 236 MS
QRS AXIS: -73 DEGREES
QRSD INTERVAL: 204 MS
QT INTERVAL: 532 MS
QTC INTERVAL: 535 MS
T WAVE AXIS: 91 DEGREES
VENTRICULAR RATE: 61 BPM

## 2023-07-03 PROCEDURE — 93010 ELECTROCARDIOGRAM REPORT: CPT | Performed by: INTERNAL MEDICINE

## 2023-07-05 ENCOUNTER — OFFICE VISIT (OUTPATIENT)
Dept: GASTROENTEROLOGY | Facility: AMBULARY SURGERY CENTER | Age: 78
End: 2023-07-05
Payer: COMMERCIAL

## 2023-07-05 VITALS
OXYGEN SATURATION: 96 % | BODY MASS INDEX: 41.82 KG/M2 | HEIGHT: 61 IN | HEART RATE: 64 BPM | DIASTOLIC BLOOD PRESSURE: 64 MMHG | SYSTOLIC BLOOD PRESSURE: 112 MMHG

## 2023-07-05 DIAGNOSIS — R18.8 CIRRHOSIS OF LIVER WITH ASCITES, UNSPECIFIED HEPATIC CIRRHOSIS TYPE (HCC): ICD-10-CM

## 2023-07-05 DIAGNOSIS — D64.9 ANEMIA, UNSPECIFIED TYPE: Primary | ICD-10-CM

## 2023-07-05 DIAGNOSIS — K55.20 AVM (ARTERIOVENOUS MALFORMATION) OF COLON: ICD-10-CM

## 2023-07-05 DIAGNOSIS — K74.60 CIRRHOSIS OF LIVER WITH ASCITES, UNSPECIFIED HEPATIC CIRRHOSIS TYPE (HCC): ICD-10-CM

## 2023-07-05 DIAGNOSIS — K29.70 GASTRITIS WITHOUT BLEEDING, UNSPECIFIED CHRONICITY, UNSPECIFIED GASTRITIS TYPE: ICD-10-CM

## 2023-07-05 PROCEDURE — 99214 OFFICE O/P EST MOD 30 MIN: CPT | Performed by: INTERNAL MEDICINE

## 2023-07-05 RX ORDER — INSULIN GLARGINE 100 [IU]/ML
INJECTION, SOLUTION SUBCUTANEOUS
COMMUNITY
Start: 2023-05-15

## 2023-07-05 RX ORDER — INSULIN LISPRO 100 [IU]/ML
INJECTION, SOLUTION INTRAVENOUS; SUBCUTANEOUS
COMMUNITY
Start: 2023-04-13

## 2023-07-05 NOTE — PROGRESS NOTES
Consultation -  [facility]  Shannan Shaw 68 y.o. female MRN: [id number]  Unit/Bed#:  Encounter: [id number]        Consults    ASSESSMENT/PLAN:     1. Normocytic anemia-no evidence of overt bleeding, noted to have AVMs on a colonoscopy in 2021. Colonoscopy was limited due to inadequate bowel prep but there was evidence of multiple colon polyps as well which were removed, she was recommended a repeat colonoscopy in 1 year. She does not have any overt bleeding including melena or hematochezia. Hemoglobin appears to be stable over the past 3 years without any signs of overt bleeding. I suspect anemia is multifactorial given history of CKD. -Patient is refusing colonoscopy and does not want to drink prep.  -She is open to EGD for evaluation of anemia and for variceal screening however she remains high risk due to her comorbidities including severe AS/CHF. I discussed this at length with patient and her niece who is present during the office visit. -They would like to proceed with EGD if cleared from a cardiology standpoint. I discussed with them that even if she is optimized from cardiac standpoint, the procedure will need to be done in the hospital.  -Check iron indices, vitamin B12 and folate. 2.  Cirrhosis/LI- RADS 5 [id number] with IR as recommended by Dr. Torrie Kellogg. -Follow-up with Dr. Torrie Kellogg. -No signs of decompensation including ascites, hepatic encephalopathy noted on exam.  -Continue diuresis as per cardiology. 3.  Gastritis-continue Omeprazole 40 mg on daily basis.     4.  History of AVMs-status post obliteration on a colonoscopy in 2021.    ______________________________________________________________________    Reason for Consult / Principal Problem: [unfilled]    HPI: Shannan Shaw is a 68y.o. year old female with history of type 2 diabetes, CKD, CVA, AS, Brown cirrhosis, at least 1 hepatic lobe LI-RADS 5 lesion in segment 6,  seen by Dr. Torrie Kellogg 3 weeks ago presents for follow-up. Patient was discussed during tumor board and the recommendation was to consider liver directed therapy for the LI-RADS 5 lesion. Dr. Pooja Valerio discussed with patient's niece that patient would be high risk for LDT and systemic therapy given her comorbidities and functional status but was placed a referral for IR for further evaluation. Meanwhile, patient was in the emergency room and was noted to be anemic from her baseline of 7-6.5. She did not have any signs of overt bleeding including melena or hematochezia. She was recommended to follow-up with GI. She had last undergone EGD in June 2021 and was noted to have gastritis and trace esophageal varices. She also underwent colonoscopy at that time which was limited due to inadequate bowel prep but did not show any obvious lesions. There were nonbleeding AVMs within the ascending colon which were treated with APC. There was also evidence of left-sided diverticulosis, colon polyps and internal hemorrhoids. She had multiple polyps that were sessile serrated adenomas and tubular adenomas without high-grade dysplasia. Her most recent hemoglobin is 7.3, 7.5 at the beginning of the month. Patient's hemoglobin has remained between 7-8 over the past 2 to 3 years. Review of Systems: The remainder of the review of systems was negative except for the pertinent positives noted in HPI.      Historical Information   Past Medical History:   Diagnosis Date   • Anemia    • AV block    • Benign neoplasm of pituitary gland (HCC) 10/2/2013   • Chronic kidney disease    • Chronic pain disorder    • Cirrhosis of liver (HCC)    • CVA (cerebral vascular accident) ([address])    • Depression    • Diabetes mellitus ([address])    • Hearing loss    • Hemiparesis due to old cerebrovascular accident [facility])    • Hyperlipidemia    • Neuropathy    • Stenosis of carotid artery    • Stroke [facility])      Past Surgical History:   Procedure Laterality Date   • CARDIAC PACEMAKER PLACEMENT     • IR PARACENTESIS  12/24/2020   • JOINT REPLACEMENT      right knee     Social History   Social History     Substance and Sexual Activity   Alcohol Use Not Currently     Social History     Substance and Sexual Activity   Drug Use Never     Social History     Tobacco Use   Smoking Status Former   Smokeless Tobacco Never     Family History   Problem Relation Age of Onset   • No Known Problems Mother    • No Known Problems Father        Meds/Allergies     (Not in a hospital admission)    No current facility-administered medications for this visit. No Known Allergies    Objective     Blood pressure 112/64, pulse 64, height 5' 1" (1.549 m), SpO2 96 %. [unfilled]    PHYSICAL EXAM     GEN: well nourished, well developed, no acute distress, wheelchair-bound. HEENT: anicteric, MMM  CV: RRR, no m/r/g  CHEST: CTA b/l, no WRR  ABD: +BS, soft, NT/ND, no hepatosplenomegaly,  EXT: 1+ edema B/l, no cyanosis,  no asterixis  SKIN: no rashes,  NEURO: aaox3    Lab Results:   No visits with results within 1 Day(s) from this visit.    Latest known visit with results is:   Admission on 06/29/2023, Discharged on 06/29/2023   Component Date Value   • WBC 06/29/2023 4.04 (L)    • RBC 06/29/2023 2.73 (L)    • Hemoglobin 06/29/2023 7.3 (L)    • Hematocrit 06/29/2023 24.4 (L)    • MCV 06/29/2023 89    • MCH 06/29/2023 26.7 (L)    • MCHC 06/29/2023 29.9 (L)    • RDW 06/29/2023 16.4 (H)    • MPV 06/29/2023 10.0    • Platelets 70/27/2569 90 (L)    • nRBC 06/29/2023 0    • Neutrophils Relative 06/29/2023 74    • Immat GRANS % 06/29/2023 1    • Lymphocytes Relative 06/29/2023 17    • Monocytes Relative 06/29/2023 6    • Eosinophils Relative 06/29/2023 2    • Basophils Relative 06/29/2023 0    • Neutrophils Absolute 06/29/2023 2.98    • Immature Grans Absolute 06/29/2023 0.03    • Lymphocytes Absolute 06/29/2023 0.68    • Monocytes Absolute 06/29/2023 0.26    • Eosinophils Absolute 06/29/2023 0.08    • Basophils Absolute 06/29/2023 0.01    • ABO Grouping 06/29/2023 A    • Rh Factor 06/29/2023 Negative    • Antibody Screen 06/29/2023 Negative    • Specimen Expiration Date 06/29/2023 [id number]    • Ventricular Rate 06/29/2023 61    • Atrial Rate 06/29/2023 61    • OH Interval 06/29/2023 236    • QRSD Interval 06/29/2023 204    • QT Interval 06/29/2023 532    • QTC Interval 06/29/2023 535    • P Axis 06/29/2023 65    • QRS Axis 06/29/2023 -73    • T Wave Axis 06/29/2023 91    • ABO Grouping 06/29/2023 A    • Rh Factor 06/29/2023 Negative      Imaging Studies: I have personally reviewed pertinent films in PACS

## 2023-07-06 ENCOUNTER — TELEPHONE (OUTPATIENT)
Age: 78
End: 2023-07-06

## 2023-07-06 NOTE — TELEPHONE ENCOUNTER
ayaz from [address] [place] [address] called about pts EGD and getting clearance from cardiology. I stated I would have our office reach out to schedule EGD. Pt needs to be in hospital setting. To schedule please call [phone]    ayaz stated they would arrange getting pt scheduled with cardiology in order to get clearance.

## 2023-07-10 NOTE — TELEPHONE ENCOUNTER
[facility] ambulance [phone] and was told to call back to schedule pt between the hours of 7am-3pm.  Will call tomorrow to schedule pt. Will then need to call floor to obtain fax number to fax instructions.

## 2023-07-10 NOTE — TELEPHONE ENCOUNTER
Called and spoke to nurse on pt's floor whom informed to call [facility] ambulance to schedule at [phone].

## 2023-07-11 ENCOUNTER — TELEMEDICINE (OUTPATIENT)
Dept: INTERVENTIONAL RADIOLOGY/VASCULAR | Facility: CLINIC | Age: 78
End: 2023-07-11
Payer: COMMERCIAL

## 2023-07-11 DIAGNOSIS — K76.9 HEPATIC LESION: ICD-10-CM

## 2023-07-11 PROCEDURE — 99442 PR PHYS/QHP TELEPHONE EVALUATION 11-20 MIN: CPT | Performed by: INTERNAL MEDICINE

## 2023-07-11 NOTE — TELEPHONE ENCOUNTER
Fax was not going through. refaxed to [phone]. Was told that that is the supervisor fax number and they will have her bring up to floor. Will call in a few days to check on status of clearance.

## 2023-07-11 NOTE — TELEPHONE ENCOUNTER
Faxed urgent cardiac clearance request to [facility] along with pt's instructions to [phone]. Will call in a few days to check on status of clearance.

## 2023-07-11 NOTE — PROGRESS NOTES
Virtual Brief Visit    This Visit is being completed by telephone. The Patient is located at Home and in the following state in which I hold an active license [place]    The patient was identified by name and date of birth. Amanda Finn was informed that this is a telemedicine visit and that the visit is being conducted through Telephone. My office door was closed. No one else was in the room. She acknowledged consent and understanding of privacy and security of the video platform. The patient has agreed to participate and understands they can discontinue the visit at any time. Patient is aware this is a billable service. Assessment/Plan:    Patient is a 68year-old female with history of cirrhosis of the liver with recent MRI of the abdomen showing a 3.6 x 2.8 cm LIRADS-5 lesion at segment 6 of the liver, as well as abnormality at segment 8 of the liver measuring up to 4.8 cm, however incompletely characterized, but suspicious for an additional lesion. Patient lives at [facility] [address] and a telephone consultation was performed with the assistance of the nurse Juana at the facility. Patient is hard of hearing. Patient denies any new symptoms at this time. She is aware of her liver mass diagnosis. She is not sure if she would like to go through any procedure, however, would like to involve her family. Speaking with the nurse, the patient is bed-bound the majority of the day, and requires assistance for her daily needs. Patient probably would have difficulty laying still for a procedure and would require general anesthesia for breath holds. The patient's niece, Compa Aragon, was subsequently contacted and the benefits and risks of the procedure was explained to her. The goal of the therapy would be to treat the two liver lesions with a palliative intent to slow down or stop the tumors, however, will likely not cure the liver masses.  Patient's bedbound status and medical comorbidities was discussed with family, including decreased renal function which may be impacted with the use of iodinated contrast. Patient would also require general anesthesia to complete the procedure, which would require temporary rescinding of the DNR/DNI status just for the procedure. The niece states she will meet with the patient this weekend along with her sister and will make a decision early next week for procedure, to discuss if the benefits of the procedure outweigh the risks. The niece was provided a phone number to the interventional radiology department at [phone]. North Lynch MD  Interventional Radiology      Problem List Items Addressed This Visit        Digestive    Hepatic lesion       Recent Visits  No visits were found meeting these conditions. Showing recent visits within past 7 days and meeting all other requirements  Future Appointments  No visits were found meeting these conditions.   Showing future appointments within next 150 days and meeting all other requirements         Visit Time  Total Visit Duration: 15 minutes

## 2023-07-12 ENCOUNTER — NURSE TRIAGE (OUTPATIENT)
Age: 78
End: 2023-07-12

## 2023-07-12 NOTE — TELEPHONE ENCOUNTER
Spoke with Carina Delgado from [place], patient was seen by cardiologist yesterday and was not cleared by them to have EGD done. Cardiologist said she is not a candidate for procedure.

## 2023-07-12 NOTE — TELEPHONE ENCOUNTER
Called and spoke to [place] at [facility] and informed pt not cleared by cardiologist and procedure is cancelled.

## 2023-07-18 ENCOUNTER — HOSPICE ADMISSION (OUTPATIENT)
Dept: HOME HOSPICE | Facility: HOSPICE | Age: 78
End: 2023-07-18
Payer: MEDICARE

## 2023-07-18 ENCOUNTER — HOME CARE VISIT (OUTPATIENT)
Dept: HOME HEALTH SERVICES | Facility: HOME HEALTHCARE | Age: 78
End: 2023-07-18
Payer: MEDICARE

## 2023-07-18 NOTE — Clinical Note
For RLOC at [facility] referred by Dr Harshad Pascual. Cadence Steiner  [birth year] 67 yo long time resident of [facility]. Complicated medical hx. Severe AS, Chronic Diastolic Heart Failure, PAF, Hx of CVA with left sided paralysis, DM@, CKD stage 4, Venous Insufficieny, Iron def anemia, KRUEGER cirrhosis, New liver lesions, Esophageal Varices, Pressure wound of sacrum just debrided on 7.15.23, Pressure wound of R buttock. Wt Loss was 221 in  now 211. LABS IRON 28, HGB 6.5 WBC 3.5 PLTS 75,00, ALB 3.2 BUN 37 CREAT 1.97 GFR 26 . MD wanted EGD for work up of decreasing Hgb, however pts cardiologist would not clear her for procedure. Pt is a lift OOB to high back chair and requires propping to stay upright,. 02 at 2 L PRN NC, Lasix 40 daily. SOB at rest. PPS 40 Approve RLOC?

## 2023-07-19 ENCOUNTER — TELEPHONE (OUTPATIENT)
Dept: RADIATION ONCOLOGY | Facility: CLINIC | Age: 78
End: 2023-07-19

## 2023-07-19 NOTE — TELEPHONE ENCOUNTER
ASAEL ONC - spoke with Cadence'dave STOCK and Ginger Turner. Patient has elected Hospice / [address] [place] [address]. Referral to [facility] has been closed. ..KD

## 2023-07-21 ENCOUNTER — HOME CARE VISIT (OUTPATIENT)
Dept: HOME HOSPICE | Facility: HOSPICE | Age: 78
End: 2023-07-21
Payer: MEDICARE

## 2023-07-21 ENCOUNTER — HOME CARE VISIT (OUTPATIENT)
Dept: HOME HEALTH SERVICES | Facility: HOME HEALTHCARE | Age: 78
End: 2023-07-21
Payer: MEDICARE

## 2023-07-21 VITALS
BODY MASS INDEX: 39.84 KG/M2 | HEART RATE: 78 BPM | RESPIRATION RATE: 18 BRPM | HEIGHT: 61 IN | DIASTOLIC BLOOD PRESSURE: 84 MMHG | SYSTOLIC BLOOD PRESSURE: 118 MMHG | TEMPERATURE: 98.1 F | WEIGHT: 211 LBS

## 2023-07-21 PROCEDURE — G0155 HHCP-SVS OF CSW,EA 15 MIN: HCPCS

## 2023-07-21 PROCEDURE — G0299 HHS/HOSPICE OF RN EA 15 MIN: HCPCS

## 2023-07-23 ENCOUNTER — HOME CARE VISIT (OUTPATIENT)
Dept: HOME HEALTH SERVICES | Facility: HOME HEALTHCARE | Age: 78
End: 2023-07-23
Payer: MEDICARE

## 2023-07-23 VITALS — DIASTOLIC BLOOD PRESSURE: 76 MMHG | SYSTOLIC BLOOD PRESSURE: 122 MMHG | RESPIRATION RATE: 20 BRPM | HEART RATE: 72 BPM

## 2023-07-23 PROCEDURE — G0299 HHS/HOSPICE OF RN EA 15 MIN: HCPCS

## 2023-07-24 ENCOUNTER — HOME CARE VISIT (OUTPATIENT)
Dept: HOME HOSPICE | Facility: HOSPICE | Age: 78
End: 2023-07-24
Payer: MEDICARE

## 2023-07-25 ENCOUNTER — HOME CARE VISIT (OUTPATIENT)
Dept: HOME HEALTH SERVICES | Facility: HOME HEALTHCARE | Age: 78
End: 2023-07-25
Payer: MEDICARE

## 2023-07-25 PROCEDURE — G0156 HHCP-SVS OF AIDE,EA 15 MIN: HCPCS

## 2023-07-26 ENCOUNTER — HOME CARE VISIT (OUTPATIENT)
Dept: HOME HEALTH SERVICES | Facility: HOME HEALTHCARE | Age: 78
End: 2023-07-26
Payer: MEDICARE

## 2023-07-26 VITALS — RESPIRATION RATE: 22 BRPM | HEART RATE: 66 BPM

## 2023-07-26 PROCEDURE — G0299 HHS/HOSPICE OF RN EA 15 MIN: HCPCS

## 2023-07-28 ENCOUNTER — HOME CARE VISIT (OUTPATIENT)
Dept: HOME HEALTH SERVICES | Facility: HOME HEALTHCARE | Age: 78
End: 2023-07-28
Payer: MEDICARE

## 2023-07-28 PROCEDURE — G0156 HHCP-SVS OF AIDE,EA 15 MIN: HCPCS

## 2023-07-31 ENCOUNTER — HOME CARE VISIT (OUTPATIENT)
Dept: HOME HEALTH SERVICES | Facility: HOME HEALTHCARE | Age: 78
End: 2023-07-31
Payer: MEDICARE

## 2023-07-31 PROCEDURE — G0156 HHCP-SVS OF AIDE,EA 15 MIN: HCPCS

## 2023-08-01 ENCOUNTER — HOME CARE VISIT (OUTPATIENT)
Dept: HOME HEALTH SERVICES | Facility: HOME HEALTHCARE | Age: 78
End: 2023-08-01
Payer: MEDICARE

## 2023-08-01 ENCOUNTER — HOME CARE VISIT (OUTPATIENT)
Dept: HOME HOSPICE | Facility: HOSPICE | Age: 78
End: 2023-08-01
Payer: MEDICARE

## 2023-08-01 VITALS — WEIGHT: 207 LBS | BODY MASS INDEX: 39.11 KG/M2

## 2023-08-01 PROCEDURE — G0156 HHCP-SVS OF AIDE,EA 15 MIN: HCPCS

## 2023-08-02 ENCOUNTER — HOME CARE VISIT (OUTPATIENT)
Dept: HOME HEALTH SERVICES | Facility: HOME HEALTHCARE | Age: 78
End: 2023-08-02
Payer: MEDICARE

## 2023-08-02 VITALS
DIASTOLIC BLOOD PRESSURE: 68 MMHG | SYSTOLIC BLOOD PRESSURE: 130 MMHG | TEMPERATURE: 97.8 F | HEART RATE: 78 BPM | RESPIRATION RATE: 20 BRPM

## 2023-08-02 PROCEDURE — G0299 HHS/HOSPICE OF RN EA 15 MIN: HCPCS

## 2023-08-03 ENCOUNTER — HOME CARE VISIT (OUTPATIENT)
Dept: HOME HOSPICE | Facility: HOSPICE | Age: 78
End: 2023-08-03
Payer: MEDICARE

## 2023-08-03 ENCOUNTER — HOME CARE VISIT (OUTPATIENT)
Dept: HOME HEALTH SERVICES | Facility: HOME HEALTHCARE | Age: 78
End: 2023-08-03
Payer: MEDICARE

## 2023-08-03 PROCEDURE — G0156 HHCP-SVS OF AIDE,EA 15 MIN: HCPCS

## 2023-08-04 ENCOUNTER — HOME CARE VISIT (OUTPATIENT)
Dept: HOME HOSPICE | Facility: HOSPICE | Age: 78
End: 2023-08-04
Payer: MEDICARE

## 2023-08-08 ENCOUNTER — HOME CARE VISIT (OUTPATIENT)
Dept: HOME HEALTH SERVICES | Facility: HOME HEALTHCARE | Age: 78
End: 2023-08-08
Payer: MEDICARE

## 2023-08-08 VITALS
SYSTOLIC BLOOD PRESSURE: 126 MMHG | DIASTOLIC BLOOD PRESSURE: 78 MMHG | HEART RATE: 76 BPM | RESPIRATION RATE: 20 BRPM | TEMPERATURE: 98.2 F

## 2023-08-08 PROCEDURE — G0156 HHCP-SVS OF AIDE,EA 15 MIN: HCPCS

## 2023-08-08 PROCEDURE — G0299 HHS/HOSPICE OF RN EA 15 MIN: HCPCS

## 2023-08-10 ENCOUNTER — HOME CARE VISIT (OUTPATIENT)
Dept: HOME HEALTH SERVICES | Facility: HOME HEALTHCARE | Age: 78
End: 2023-08-10
Payer: MEDICARE

## 2023-08-10 PROCEDURE — G0156 HHCP-SVS OF AIDE,EA 15 MIN: HCPCS

## 2023-08-11 ENCOUNTER — HOME CARE VISIT (OUTPATIENT)
Dept: HOME HEALTH SERVICES | Facility: HOME HEALTHCARE | Age: 78
End: 2023-08-11
Payer: MEDICARE

## 2023-08-11 PROCEDURE — G0156 HHCP-SVS OF AIDE,EA 15 MIN: HCPCS

## 2023-08-13 ENCOUNTER — HOME CARE VISIT (OUTPATIENT)
Dept: HOME HEALTH SERVICES | Facility: HOME HEALTHCARE | Age: 78
End: 2023-08-13
Payer: MEDICARE

## 2023-08-13 PROCEDURE — G0156 HHCP-SVS OF AIDE,EA 15 MIN: HCPCS

## 2023-08-14 ENCOUNTER — HOME CARE VISIT (OUTPATIENT)
Dept: HOME HOSPICE | Facility: HOSPICE | Age: 78
End: 2023-08-14
Payer: MEDICARE

## 2023-08-14 ENCOUNTER — HOME CARE VISIT (OUTPATIENT)
Dept: HOME HEALTH SERVICES | Facility: HOME HEALTHCARE | Age: 78
End: 2023-08-14
Payer: MEDICARE

## 2023-08-14 PROCEDURE — G0299 HHS/HOSPICE OF RN EA 15 MIN: HCPCS

## 2023-08-14 PROCEDURE — G0155 HHCP-SVS OF CSW,EA 15 MIN: HCPCS

## 2023-08-15 ENCOUNTER — HOME CARE VISIT (OUTPATIENT)
Dept: HOME HEALTH SERVICES | Facility: HOME HEALTHCARE | Age: 78
End: 2023-08-15
Payer: MEDICARE

## 2023-08-15 PROCEDURE — G0156 HHCP-SVS OF AIDE,EA 15 MIN: HCPCS

## 2023-08-17 ENCOUNTER — HOME CARE VISIT (OUTPATIENT)
Dept: HOME HEALTH SERVICES | Facility: HOME HEALTHCARE | Age: 78
End: 2023-08-17
Payer: MEDICARE

## 2023-08-17 VITALS
SYSTOLIC BLOOD PRESSURE: 138 MMHG | RESPIRATION RATE: 24 BRPM | HEART RATE: 78 BPM | DIASTOLIC BLOOD PRESSURE: 78 MMHG | TEMPERATURE: 97.6 F

## 2023-08-17 PROCEDURE — G0156 HHCP-SVS OF AIDE,EA 15 MIN: HCPCS

## 2023-08-17 PROCEDURE — G0299 HHS/HOSPICE OF RN EA 15 MIN: HCPCS

## 2023-08-22 ENCOUNTER — HOME CARE VISIT (OUTPATIENT)
Dept: HOME HEALTH SERVICES | Facility: HOME HEALTHCARE | Age: 78
End: 2023-08-22
Payer: MEDICARE

## 2023-08-22 VITALS
DIASTOLIC BLOOD PRESSURE: 70 MMHG | HEART RATE: 78 BPM | RESPIRATION RATE: 24 BRPM | TEMPERATURE: 97.2 F | SYSTOLIC BLOOD PRESSURE: 122 MMHG

## 2023-08-22 PROCEDURE — G0156 HHCP-SVS OF AIDE,EA 15 MIN: HCPCS

## 2023-08-22 PROCEDURE — G0299 HHS/HOSPICE OF RN EA 15 MIN: HCPCS

## 2023-08-23 ENCOUNTER — HOME CARE VISIT (OUTPATIENT)
Dept: HOME HOSPICE | Facility: HOSPICE | Age: 78
End: 2023-08-23
Payer: MEDICARE

## 2023-08-24 ENCOUNTER — HOME CARE VISIT (OUTPATIENT)
Dept: HOME HEALTH SERVICES | Facility: HOME HEALTHCARE | Age: 78
End: 2023-08-24
Payer: MEDICARE

## 2023-08-24 PROCEDURE — G0156 HHCP-SVS OF AIDE,EA 15 MIN: HCPCS

## 2023-08-25 ENCOUNTER — HOME CARE VISIT (OUTPATIENT)
Dept: HOME HEALTH SERVICES | Facility: HOME HEALTHCARE | Age: 78
End: 2023-08-25
Payer: MEDICARE

## 2023-08-25 PROCEDURE — G0156 HHCP-SVS OF AIDE,EA 15 MIN: HCPCS

## 2023-08-29 ENCOUNTER — HOME CARE VISIT (OUTPATIENT)
Dept: HOME HEALTH SERVICES | Facility: HOME HEALTHCARE | Age: 78
End: 2023-08-29
Payer: MEDICARE

## 2023-08-29 PROCEDURE — G0156 HHCP-SVS OF AIDE,EA 15 MIN: HCPCS

## 2023-08-29 PROCEDURE — G0299 HHS/HOSPICE OF RN EA 15 MIN: HCPCS

## 2023-08-30 VITALS — DIASTOLIC BLOOD PRESSURE: 72 MMHG | HEART RATE: 70 BPM | SYSTOLIC BLOOD PRESSURE: 124 MMHG | RESPIRATION RATE: 20 BRPM

## 2023-08-31 ENCOUNTER — HOME CARE VISIT (OUTPATIENT)
Dept: HOME HEALTH SERVICES | Facility: HOME HEALTHCARE | Age: 78
End: 2023-08-31
Payer: MEDICARE

## 2023-08-31 PROCEDURE — G0156 HHCP-SVS OF AIDE,EA 15 MIN: HCPCS

## 2023-09-01 ENCOUNTER — HOME CARE VISIT (OUTPATIENT)
Dept: HOME HEALTH SERVICES | Facility: HOME HEALTHCARE | Age: 78
End: 2023-09-01
Payer: MEDICARE

## 2023-09-01 PROCEDURE — G0299 HHS/HOSPICE OF RN EA 15 MIN: HCPCS

## 2023-09-01 PROCEDURE — G0156 HHCP-SVS OF AIDE,EA 15 MIN: HCPCS

## 2023-09-04 ENCOUNTER — HOME CARE VISIT (OUTPATIENT)
Dept: HOME HEALTH SERVICES | Facility: HOME HEALTHCARE | Age: 78
End: 2023-09-04
Payer: MEDICARE

## 2023-09-04 PROCEDURE — G0156 HHCP-SVS OF AIDE,EA 15 MIN: HCPCS

## 2023-09-06 ENCOUNTER — HOME CARE VISIT (OUTPATIENT)
Dept: HOME HEALTH SERVICES | Facility: HOME HEALTHCARE | Age: 78
End: 2023-09-06
Payer: MEDICARE

## 2023-09-06 VITALS
RESPIRATION RATE: 24 BRPM | DIASTOLIC BLOOD PRESSURE: 68 MMHG | HEART RATE: 80 BPM | SYSTOLIC BLOOD PRESSURE: 130 MMHG | TEMPERATURE: 97.2 F

## 2023-09-06 PROCEDURE — G0299 HHS/HOSPICE OF RN EA 15 MIN: HCPCS

## 2023-09-07 ENCOUNTER — HOME CARE VISIT (OUTPATIENT)
Dept: HOME HEALTH SERVICES | Facility: HOME HEALTHCARE | Age: 78
End: 2023-09-07
Payer: MEDICARE

## 2023-09-07 PROCEDURE — G0156 HHCP-SVS OF AIDE,EA 15 MIN: HCPCS

## 2023-09-08 ENCOUNTER — HOME CARE VISIT (OUTPATIENT)
Dept: HOME HEALTH SERVICES | Facility: HOME HEALTHCARE | Age: 78
End: 2023-09-08
Payer: MEDICARE

## 2023-09-08 PROCEDURE — G0156 HHCP-SVS OF AIDE,EA 15 MIN: HCPCS

## 2023-09-08 PROCEDURE — G0299 HHS/HOSPICE OF RN EA 15 MIN: HCPCS

## 2023-09-11 ENCOUNTER — HOME CARE VISIT (OUTPATIENT)
Dept: HOME HEALTH SERVICES | Facility: HOME HEALTHCARE | Age: 78
End: 2023-09-11
Payer: MEDICARE

## 2023-09-11 VITALS
HEART RATE: 70 BPM | RESPIRATION RATE: 22 BRPM | SYSTOLIC BLOOD PRESSURE: 130 MMHG | DIASTOLIC BLOOD PRESSURE: 78 MMHG | TEMPERATURE: 98.2 F

## 2023-09-11 PROCEDURE — G0156 HHCP-SVS OF AIDE,EA 15 MIN: HCPCS

## 2023-09-11 PROCEDURE — G0299 HHS/HOSPICE OF RN EA 15 MIN: HCPCS

## 2023-09-12 ENCOUNTER — HOME CARE VISIT (OUTPATIENT)
Dept: HOME HOSPICE | Facility: HOSPICE | Age: 78
End: 2023-09-12
Payer: MEDICARE

## 2023-09-12 ENCOUNTER — HOME CARE VISIT (OUTPATIENT)
Dept: HOME HEALTH SERVICES | Facility: HOME HEALTHCARE | Age: 78
End: 2023-09-12
Payer: MEDICARE

## 2023-09-12 PROCEDURE — G0156 HHCP-SVS OF AIDE,EA 15 MIN: HCPCS

## 2023-09-15 ENCOUNTER — HOME CARE VISIT (OUTPATIENT)
Dept: HOME HEALTH SERVICES | Facility: HOME HEALTHCARE | Age: 78
End: 2023-09-15
Payer: MEDICARE

## 2023-09-15 PROCEDURE — G0156 HHCP-SVS OF AIDE,EA 15 MIN: HCPCS

## 2023-09-19 ENCOUNTER — HOME CARE VISIT (OUTPATIENT)
Dept: HOME HEALTH SERVICES | Facility: HOME HEALTHCARE | Age: 78
End: 2023-09-19
Payer: MEDICARE

## 2023-09-19 VITALS
SYSTOLIC BLOOD PRESSURE: 130 MMHG | HEART RATE: 78 BPM | DIASTOLIC BLOOD PRESSURE: 68 MMHG | RESPIRATION RATE: 22 BRPM | TEMPERATURE: 98.4 F

## 2023-09-19 PROCEDURE — G0299 HHS/HOSPICE OF RN EA 15 MIN: HCPCS

## 2023-09-20 ENCOUNTER — HOME CARE VISIT (OUTPATIENT)
Dept: HOME HEALTH SERVICES | Facility: HOME HEALTHCARE | Age: 78
End: 2023-09-20
Payer: MEDICARE

## 2023-09-20 PROCEDURE — G0156 HHCP-SVS OF AIDE,EA 15 MIN: HCPCS

## 2023-09-21 ENCOUNTER — HOME CARE VISIT (OUTPATIENT)
Dept: HOME HEALTH SERVICES | Facility: HOME HEALTHCARE | Age: 78
End: 2023-09-21
Payer: MEDICARE

## 2023-09-21 PROCEDURE — G0156 HHCP-SVS OF AIDE,EA 15 MIN: HCPCS

## 2023-09-22 ENCOUNTER — HOME CARE VISIT (OUTPATIENT)
Dept: HOME HEALTH SERVICES | Facility: HOME HEALTHCARE | Age: 78
End: 2023-09-22
Payer: MEDICARE

## 2023-09-24 ENCOUNTER — HOME CARE VISIT (OUTPATIENT)
Dept: HOME HEALTH SERVICES | Facility: HOME HEALTHCARE | Age: 78
End: 2023-09-24

## 2023-09-24 ENCOUNTER — HOME CARE VISIT (OUTPATIENT)
Dept: HOME HEALTH SERVICES | Facility: HOME HEALTHCARE | Age: 78
End: 2023-09-24
Payer: MEDICARE

## 2023-09-24 VITALS
RESPIRATION RATE: 24 BRPM | DIASTOLIC BLOOD PRESSURE: 80 MMHG | HEART RATE: 60 BPM | TEMPERATURE: 98 F | SYSTOLIC BLOOD PRESSURE: 110 MMHG

## 2023-09-24 PROCEDURE — G0299 HHS/HOSPICE OF RN EA 15 MIN: HCPCS

## 2023-09-24 PROCEDURE — G0156 HHCP-SVS OF AIDE,EA 15 MIN: HCPCS

## 2023-09-25 ENCOUNTER — HOME CARE VISIT (OUTPATIENT)
Dept: HOME HOSPICE | Facility: HOSPICE | Age: 78
End: 2023-09-25
Payer: MEDICARE

## 2023-09-27 ENCOUNTER — HOME CARE VISIT (OUTPATIENT)
Dept: HOME HEALTH SERVICES | Facility: HOME HEALTHCARE | Age: 78
End: 2023-09-27
Payer: MEDICARE

## 2023-09-27 PROCEDURE — G0156 HHCP-SVS OF AIDE,EA 15 MIN: HCPCS

## 2023-09-28 ENCOUNTER — HOME CARE VISIT (OUTPATIENT)
Dept: HOME HEALTH SERVICES | Facility: HOME HEALTHCARE | Age: 78
End: 2023-09-28
Payer: MEDICARE

## 2023-09-28 PROCEDURE — G0156 HHCP-SVS OF AIDE,EA 15 MIN: HCPCS

## 2023-10-02 ENCOUNTER — HOME CARE VISIT (OUTPATIENT)
Dept: HOME HEALTH SERVICES | Facility: HOME HEALTHCARE | Age: 78
End: 2023-10-02
Payer: MEDICARE

## 2023-10-02 VITALS — WEIGHT: 196.13 LBS | BODY MASS INDEX: 37.06 KG/M2

## 2023-10-02 PROCEDURE — G0156 HHCP-SVS OF AIDE,EA 15 MIN: HCPCS

## 2023-10-03 ENCOUNTER — HOME CARE VISIT (OUTPATIENT)
Dept: HOME HEALTH SERVICES | Facility: HOME HEALTHCARE | Age: 78
End: 2023-10-03
Payer: MEDICARE

## 2023-10-03 VITALS
DIASTOLIC BLOOD PRESSURE: 68 MMHG | RESPIRATION RATE: 24 BRPM | WEIGHT: 195 LBS | SYSTOLIC BLOOD PRESSURE: 120 MMHG | BODY MASS INDEX: 36.84 KG/M2 | TEMPERATURE: 98.2 F | HEART RATE: 78 BPM

## 2023-10-03 PROCEDURE — G0299 HHS/HOSPICE OF RN EA 15 MIN: HCPCS

## 2023-10-03 PROCEDURE — G0156 HHCP-SVS OF AIDE,EA 15 MIN: HCPCS

## 2023-10-05 ENCOUNTER — HOME CARE VISIT (OUTPATIENT)
Dept: HOME HEALTH SERVICES | Facility: HOME HEALTHCARE | Age: 78
End: 2023-10-05
Payer: MEDICARE

## 2023-10-05 PROCEDURE — G0299 HHS/HOSPICE OF RN EA 15 MIN: HCPCS

## 2023-10-05 PROCEDURE — G0156 HHCP-SVS OF AIDE,EA 15 MIN: HCPCS

## 2023-10-09 ENCOUNTER — HOME CARE VISIT (OUTPATIENT)
Dept: HOME HEALTH SERVICES | Facility: HOME HEALTHCARE | Age: 78
End: 2023-10-09
Payer: MEDICARE

## 2023-10-09 ENCOUNTER — HOME CARE VISIT (OUTPATIENT)
Dept: HOME HOSPICE | Facility: HOSPICE | Age: 78
End: 2023-10-09
Payer: MEDICARE

## 2023-10-09 PROCEDURE — G0155 HHCP-SVS OF CSW,EA 15 MIN: HCPCS

## 2023-10-09 PROCEDURE — G0299 HHS/HOSPICE OF RN EA 15 MIN: HCPCS

## 2023-10-10 VITALS
RESPIRATION RATE: 22 BRPM | WEIGHT: 195 LBS | DIASTOLIC BLOOD PRESSURE: 70 MMHG | BODY MASS INDEX: 36.84 KG/M2 | SYSTOLIC BLOOD PRESSURE: 138 MMHG | TEMPERATURE: 98.2 F | HEART RATE: 78 BPM

## 2023-10-11 ENCOUNTER — HOME CARE VISIT (OUTPATIENT)
Dept: HOME HEALTH SERVICES | Facility: HOME HEALTHCARE | Age: 78
End: 2023-10-11
Payer: MEDICARE

## 2023-10-11 ENCOUNTER — HOME CARE VISIT (OUTPATIENT)
Dept: HOME HOSPICE | Facility: HOSPICE | Age: 78
End: 2023-10-11
Payer: MEDICARE

## 2023-10-11 PROCEDURE — G0156 HHCP-SVS OF AIDE,EA 15 MIN: HCPCS

## 2023-10-12 ENCOUNTER — HOME CARE VISIT (OUTPATIENT)
Dept: HOME HEALTH SERVICES | Facility: HOME HEALTHCARE | Age: 78
End: 2023-10-12
Payer: MEDICARE

## 2023-10-12 PROCEDURE — G0299 HHS/HOSPICE OF RN EA 15 MIN: HCPCS

## 2023-10-12 PROCEDURE — G0156 HHCP-SVS OF AIDE,EA 15 MIN: HCPCS

## 2023-10-13 ENCOUNTER — HOME CARE VISIT (OUTPATIENT)
Dept: HOME HEALTH SERVICES | Facility: HOME HEALTHCARE | Age: 78
End: 2023-10-13
Payer: MEDICARE

## 2023-10-13 PROCEDURE — G0156 HHCP-SVS OF AIDE,EA 15 MIN: HCPCS

## 2023-10-15 ENCOUNTER — HOME CARE VISIT (OUTPATIENT)
Dept: HOME HEALTH SERVICES | Facility: HOME HEALTHCARE | Age: 78
End: 2023-10-15
Payer: MEDICARE

## 2023-10-15 PROCEDURE — G0156 HHCP-SVS OF AIDE,EA 15 MIN: HCPCS

## 2023-10-17 ENCOUNTER — HOME CARE VISIT (OUTPATIENT)
Dept: HOME HEALTH SERVICES | Facility: HOME HEALTHCARE | Age: 78
End: 2023-10-17
Payer: MEDICARE

## 2023-10-17 PROCEDURE — G0156 HHCP-SVS OF AIDE,EA 15 MIN: HCPCS

## 2023-10-17 PROCEDURE — G0299 HHS/HOSPICE OF RN EA 15 MIN: HCPCS

## 2023-10-18 ENCOUNTER — HOME CARE VISIT (OUTPATIENT)
Dept: HOME HEALTH SERVICES | Facility: HOME HEALTHCARE | Age: 78
End: 2023-10-18
Payer: MEDICARE

## 2023-10-18 VITALS
HEART RATE: 78 BPM | DIASTOLIC BLOOD PRESSURE: 70 MMHG | RESPIRATION RATE: 24 BRPM | TEMPERATURE: 98.2 F | SYSTOLIC BLOOD PRESSURE: 134 MMHG

## 2023-10-18 PROCEDURE — G0156 HHCP-SVS OF AIDE,EA 15 MIN: HCPCS

## 2023-10-20 ENCOUNTER — HOME CARE VISIT (OUTPATIENT)
Dept: HOME HEALTH SERVICES | Facility: HOME HEALTHCARE | Age: 78
End: 2023-10-20
Payer: MEDICARE

## 2023-10-20 PROCEDURE — G0156 HHCP-SVS OF AIDE,EA 15 MIN: HCPCS

## 2023-10-23 ENCOUNTER — HOME CARE VISIT (OUTPATIENT)
Dept: HOME HEALTH SERVICES | Facility: HOME HEALTHCARE | Age: 78
End: 2023-10-23
Payer: MEDICARE

## 2023-10-23 VITALS
TEMPERATURE: 98.4 F | SYSTOLIC BLOOD PRESSURE: 128 MMHG | HEART RATE: 72 BPM | RESPIRATION RATE: 24 BRPM | DIASTOLIC BLOOD PRESSURE: 78 MMHG

## 2023-10-23 PROCEDURE — G0299 HHS/HOSPICE OF RN EA 15 MIN: HCPCS

## 2023-10-23 PROCEDURE — G0156 HHCP-SVS OF AIDE,EA 15 MIN: HCPCS

## 2023-10-24 ENCOUNTER — HOME CARE VISIT (OUTPATIENT)
Dept: HOME HEALTH SERVICES | Facility: HOME HEALTHCARE | Age: 78
End: 2023-10-24
Payer: MEDICARE

## 2023-10-24 PROCEDURE — G0156 HHCP-SVS OF AIDE,EA 15 MIN: HCPCS

## 2023-10-25 ENCOUNTER — HOME CARE VISIT (OUTPATIENT)
Dept: HOME HEALTH SERVICES | Facility: HOME HEALTHCARE | Age: 78
End: 2023-10-25
Payer: MEDICARE

## 2023-10-25 PROCEDURE — G0156 HHCP-SVS OF AIDE,EA 15 MIN: HCPCS

## 2023-10-27 ENCOUNTER — HOME CARE VISIT (OUTPATIENT)
Dept: HOME HEALTH SERVICES | Facility: HOME HEALTHCARE | Age: 78
End: 2023-10-27
Payer: MEDICARE

## 2023-10-27 PROCEDURE — G0156 HHCP-SVS OF AIDE,EA 15 MIN: HCPCS

## 2023-11-01 ENCOUNTER — HOME CARE VISIT (OUTPATIENT)
Dept: HOME HEALTH SERVICES | Facility: HOME HEALTHCARE | Age: 78
End: 2023-11-01
Payer: MEDICARE

## 2023-11-01 VITALS — WEIGHT: 198.38 LBS | BODY MASS INDEX: 37.48 KG/M2

## 2023-11-01 PROCEDURE — G0156 HHCP-SVS OF AIDE,EA 15 MIN: HCPCS

## 2023-11-02 ENCOUNTER — HOME CARE VISIT (OUTPATIENT)
Dept: HOME HEALTH SERVICES | Facility: HOME HEALTHCARE | Age: 78
End: 2023-11-02
Payer: MEDICARE

## 2023-11-02 VITALS — RESPIRATION RATE: 24 BRPM | HEART RATE: 78 BPM

## 2023-11-02 PROCEDURE — G0299 HHS/HOSPICE OF RN EA 15 MIN: HCPCS

## 2023-11-03 ENCOUNTER — HOME CARE VISIT (OUTPATIENT)
Dept: HOME HEALTH SERVICES | Facility: HOME HEALTHCARE | Age: 78
End: 2023-11-03
Payer: MEDICARE

## 2023-11-03 PROCEDURE — G0156 HHCP-SVS OF AIDE,EA 15 MIN: HCPCS

## 2023-11-04 ENCOUNTER — HOME CARE VISIT (OUTPATIENT)
Dept: HOME HEALTH SERVICES | Facility: HOME HEALTHCARE | Age: 78
End: 2023-11-04
Payer: MEDICARE

## 2023-11-04 PROCEDURE — G0156 HHCP-SVS OF AIDE,EA 15 MIN: HCPCS

## 2023-11-07 ENCOUNTER — HOME CARE VISIT (OUTPATIENT)
Dept: HOME HEALTH SERVICES | Facility: HOME HEALTHCARE | Age: 78
End: 2023-11-07
Payer: MEDICARE

## 2023-11-07 PROCEDURE — G0156 HHCP-SVS OF AIDE,EA 15 MIN: HCPCS

## 2023-11-08 ENCOUNTER — HOME CARE VISIT (OUTPATIENT)
Dept: HOME HEALTH SERVICES | Facility: HOME HEALTHCARE | Age: 78
End: 2023-11-08
Payer: MEDICARE

## 2023-11-08 ENCOUNTER — HOME CARE VISIT (OUTPATIENT)
Dept: HOME HOSPICE | Facility: HOSPICE | Age: 78
End: 2023-11-08
Payer: MEDICARE

## 2023-11-08 VITALS
TEMPERATURE: 98.2 F | SYSTOLIC BLOOD PRESSURE: 130 MMHG | RESPIRATION RATE: 24 BRPM | DIASTOLIC BLOOD PRESSURE: 78 MMHG | HEART RATE: 68 BPM

## 2023-11-08 PROCEDURE — G0155 HHCP-SVS OF CSW,EA 15 MIN: HCPCS

## 2023-11-08 PROCEDURE — G0299 HHS/HOSPICE OF RN EA 15 MIN: HCPCS

## 2023-11-08 PROCEDURE — G0156 HHCP-SVS OF AIDE,EA 15 MIN: HCPCS

## 2023-11-09 ENCOUNTER — HOME CARE VISIT (OUTPATIENT)
Dept: HOME HEALTH SERVICES | Facility: HOME HEALTHCARE | Age: 78
End: 2023-11-09
Payer: MEDICARE

## 2023-11-09 ENCOUNTER — HOME CARE VISIT (OUTPATIENT)
Dept: HOME HOSPICE | Facility: HOSPICE | Age: 78
End: 2023-11-09
Payer: MEDICARE

## 2023-11-09 PROCEDURE — G0156 HHCP-SVS OF AIDE,EA 15 MIN: HCPCS

## 2023-11-10 ENCOUNTER — HOME CARE VISIT (OUTPATIENT)
Dept: HOME HEALTH SERVICES | Facility: HOME HEALTHCARE | Age: 78
End: 2023-11-10
Payer: MEDICARE

## 2023-11-10 PROCEDURE — G0156 HHCP-SVS OF AIDE,EA 15 MIN: HCPCS

## 2023-11-13 ENCOUNTER — HOME CARE VISIT (OUTPATIENT)
Dept: HOME HEALTH SERVICES | Facility: HOME HEALTHCARE | Age: 78
End: 2023-11-13
Payer: MEDICARE

## 2023-11-13 PROCEDURE — G0156 HHCP-SVS OF AIDE,EA 15 MIN: HCPCS

## 2023-11-14 ENCOUNTER — HOME CARE VISIT (OUTPATIENT)
Dept: HOME HEALTH SERVICES | Facility: HOME HEALTHCARE | Age: 78
End: 2023-11-14
Payer: MEDICARE

## 2023-11-14 PROCEDURE — G0156 HHCP-SVS OF AIDE,EA 15 MIN: HCPCS

## 2023-11-15 ENCOUNTER — HOME CARE VISIT (OUTPATIENT)
Dept: HOME HEALTH SERVICES | Facility: HOME HEALTHCARE | Age: 78
End: 2023-11-15
Payer: MEDICARE

## 2023-11-15 PROCEDURE — G0156 HHCP-SVS OF AIDE,EA 15 MIN: HCPCS

## 2023-11-16 ENCOUNTER — HOME CARE VISIT (OUTPATIENT)
Dept: HOME HEALTH SERVICES | Facility: HOME HEALTHCARE | Age: 78
End: 2023-11-16
Payer: MEDICARE

## 2023-11-16 VITALS
TEMPERATURE: 98.4 F | RESPIRATION RATE: 22 BRPM | SYSTOLIC BLOOD PRESSURE: 122 MMHG | DIASTOLIC BLOOD PRESSURE: 78 MMHG | HEART RATE: 70 BPM

## 2023-11-16 PROCEDURE — G0299 HHS/HOSPICE OF RN EA 15 MIN: HCPCS

## 2023-11-16 PROCEDURE — G0156 HHCP-SVS OF AIDE,EA 15 MIN: HCPCS

## 2023-11-17 ENCOUNTER — HOME CARE VISIT (OUTPATIENT)
Dept: HOME HEALTH SERVICES | Facility: HOME HEALTHCARE | Age: 78
End: 2023-11-17
Payer: MEDICARE

## 2023-11-17 PROCEDURE — G0156 HHCP-SVS OF AIDE,EA 15 MIN: HCPCS

## 2023-11-20 ENCOUNTER — HOME CARE VISIT (OUTPATIENT)
Dept: HOME HEALTH SERVICES | Facility: HOME HEALTHCARE | Age: 78
End: 2023-11-20
Payer: MEDICARE

## 2023-11-20 PROCEDURE — G0156 HHCP-SVS OF AIDE,EA 15 MIN: HCPCS

## 2023-11-21 ENCOUNTER — HOME CARE VISIT (OUTPATIENT)
Dept: HOME HEALTH SERVICES | Facility: HOME HEALTHCARE | Age: 78
End: 2023-11-21
Payer: MEDICARE

## 2023-11-21 PROCEDURE — G0156 HHCP-SVS OF AIDE,EA 15 MIN: HCPCS

## 2023-11-22 ENCOUNTER — HOME CARE VISIT (OUTPATIENT)
Dept: HOME HEALTH SERVICES | Facility: HOME HEALTHCARE | Age: 78
End: 2023-11-22
Payer: MEDICARE

## 2023-11-22 PROCEDURE — G0156 HHCP-SVS OF AIDE,EA 15 MIN: HCPCS

## 2023-11-22 PROCEDURE — G0299 HHS/HOSPICE OF RN EA 15 MIN: HCPCS

## 2023-11-23 VITALS
DIASTOLIC BLOOD PRESSURE: 70 MMHG | TEMPERATURE: 98.2 F | HEART RATE: 70 BPM | SYSTOLIC BLOOD PRESSURE: 130 MMHG | RESPIRATION RATE: 24 BRPM

## 2023-11-24 ENCOUNTER — HOME CARE VISIT (OUTPATIENT)
Dept: HOME HEALTH SERVICES | Facility: HOME HEALTHCARE | Age: 78
End: 2023-11-24
Payer: MEDICARE

## 2023-11-24 ENCOUNTER — HOME CARE VISIT (OUTPATIENT)
Dept: HOME HOSPICE | Facility: HOSPICE | Age: 78
End: 2023-11-24
Payer: MEDICARE

## 2023-11-24 PROCEDURE — G0156 HHCP-SVS OF AIDE,EA 15 MIN: HCPCS

## 2023-11-26 ENCOUNTER — HOME CARE VISIT (OUTPATIENT)
Dept: HOME HEALTH SERVICES | Facility: HOME HEALTHCARE | Age: 78
End: 2023-11-26
Payer: MEDICARE

## 2023-11-26 PROCEDURE — G0156 HHCP-SVS OF AIDE,EA 15 MIN: HCPCS

## 2023-11-28 ENCOUNTER — HOME CARE VISIT (OUTPATIENT)
Dept: HOME HEALTH SERVICES | Facility: HOME HEALTHCARE | Age: 78
End: 2023-11-28
Payer: MEDICARE

## 2023-11-28 VITALS
DIASTOLIC BLOOD PRESSURE: 78 MMHG | TEMPERATURE: 98.2 F | SYSTOLIC BLOOD PRESSURE: 122 MMHG | HEART RATE: 78 BPM | RESPIRATION RATE: 24 BRPM

## 2023-11-28 PROCEDURE — G0156 HHCP-SVS OF AIDE,EA 15 MIN: HCPCS

## 2023-11-28 PROCEDURE — G0299 HHS/HOSPICE OF RN EA 15 MIN: HCPCS

## 2023-11-29 ENCOUNTER — HOME CARE VISIT (OUTPATIENT)
Dept: HOME HEALTH SERVICES | Facility: HOME HEALTHCARE | Age: 78
End: 2023-11-29
Payer: MEDICARE

## 2023-11-29 ENCOUNTER — HOME CARE VISIT (OUTPATIENT)
Dept: HOME HOSPICE | Facility: HOSPICE | Age: 78
End: 2023-11-29
Payer: MEDICARE

## 2023-11-29 PROCEDURE — G0156 HHCP-SVS OF AIDE,EA 15 MIN: HCPCS

## 2023-11-29 PROCEDURE — G0155 HHCP-SVS OF CSW,EA 15 MIN: HCPCS

## 2023-11-30 ENCOUNTER — HOME CARE VISIT (OUTPATIENT)
Dept: HOME HEALTH SERVICES | Facility: HOME HEALTHCARE | Age: 78
End: 2023-11-30
Payer: MEDICARE

## 2023-11-30 PROCEDURE — G0156 HHCP-SVS OF AIDE,EA 15 MIN: HCPCS

## 2023-12-01 ENCOUNTER — HOME CARE VISIT (OUTPATIENT)
Dept: HOME HEALTH SERVICES | Facility: HOME HEALTHCARE | Age: 78
End: 2023-12-01
Payer: MEDICARE

## 2023-12-01 PROCEDURE — G0156 HHCP-SVS OF AIDE,EA 15 MIN: HCPCS

## 2023-12-04 ENCOUNTER — HOME CARE VISIT (OUTPATIENT)
Dept: HOME HEALTH SERVICES | Facility: HOME HEALTHCARE | Age: 78
End: 2023-12-04
Payer: MEDICARE

## 2023-12-04 PROCEDURE — G0156 HHCP-SVS OF AIDE,EA 15 MIN: HCPCS

## 2023-12-05 ENCOUNTER — HOME CARE VISIT (OUTPATIENT)
Dept: HOME HEALTH SERVICES | Facility: HOME HEALTHCARE | Age: 78
End: 2023-12-05
Payer: MEDICARE

## 2023-12-05 PROCEDURE — G0156 HHCP-SVS OF AIDE,EA 15 MIN: HCPCS

## 2023-12-06 ENCOUNTER — HOME CARE VISIT (OUTPATIENT)
Dept: HOME HEALTH SERVICES | Facility: HOME HEALTHCARE | Age: 78
End: 2023-12-06
Payer: MEDICARE

## 2023-12-06 PROCEDURE — G0156 HHCP-SVS OF AIDE,EA 15 MIN: HCPCS

## 2023-12-07 ENCOUNTER — HOME CARE VISIT (OUTPATIENT)
Dept: HOME HEALTH SERVICES | Facility: HOME HEALTHCARE | Age: 78
End: 2023-12-07
Payer: MEDICARE

## 2023-12-07 PROCEDURE — G0156 HHCP-SVS OF AIDE,EA 15 MIN: HCPCS

## 2023-12-08 ENCOUNTER — HOME CARE VISIT (OUTPATIENT)
Dept: HOME HEALTH SERVICES | Facility: HOME HEALTHCARE | Age: 78
End: 2023-12-08
Payer: MEDICARE

## 2023-12-08 ENCOUNTER — HOME CARE VISIT (OUTPATIENT)
Dept: HOME HOSPICE | Facility: HOSPICE | Age: 78
End: 2023-12-08
Payer: MEDICARE

## 2023-12-08 PROCEDURE — G0155 HHCP-SVS OF CSW,EA 15 MIN: HCPCS

## 2023-12-08 PROCEDURE — G0156 HHCP-SVS OF AIDE,EA 15 MIN: HCPCS

## 2023-12-08 PROCEDURE — G0299 HHS/HOSPICE OF RN EA 15 MIN: HCPCS

## 2023-12-13 ENCOUNTER — HOME CARE VISIT (OUTPATIENT)
Dept: HOME HEALTH SERVICES | Facility: HOME HEALTHCARE | Age: 78
End: 2023-12-13
Payer: MEDICARE

## 2023-12-13 PROCEDURE — G0156 HHCP-SVS OF AIDE,EA 15 MIN: HCPCS

## 2023-12-14 ENCOUNTER — HOME CARE VISIT (OUTPATIENT)
Dept: HOME HOSPICE | Facility: HOSPICE | Age: 78
End: 2023-12-14
Payer: MEDICARE

## 2023-12-14 ENCOUNTER — HOME CARE VISIT (OUTPATIENT)
Dept: HOME HEALTH SERVICES | Facility: HOME HEALTHCARE | Age: 78
End: 2023-12-14
Payer: MEDICARE

## 2023-12-14 VITALS
TEMPERATURE: 98.4 F | SYSTOLIC BLOOD PRESSURE: 138 MMHG | RESPIRATION RATE: 24 BRPM | HEART RATE: 78 BPM | DIASTOLIC BLOOD PRESSURE: 78 MMHG

## 2023-12-14 PROCEDURE — G0299 HHS/HOSPICE OF RN EA 15 MIN: HCPCS

## 2023-12-14 PROCEDURE — G0156 HHCP-SVS OF AIDE,EA 15 MIN: HCPCS

## 2023-12-15 ENCOUNTER — HOME CARE VISIT (OUTPATIENT)
Dept: HOME HEALTH SERVICES | Facility: HOME HEALTHCARE | Age: 78
End: 2023-12-15
Payer: MEDICARE

## 2023-12-15 PROCEDURE — G0156 HHCP-SVS OF AIDE,EA 15 MIN: HCPCS

## 2023-12-16 ENCOUNTER — HOME CARE VISIT (OUTPATIENT)
Dept: HOME HEALTH SERVICES | Facility: HOME HEALTHCARE | Age: 78
End: 2023-12-16
Payer: MEDICARE

## 2023-12-16 PROCEDURE — G0156 HHCP-SVS OF AIDE,EA 15 MIN: HCPCS

## 2023-12-17 ENCOUNTER — HOME CARE VISIT (OUTPATIENT)
Dept: HOME HEALTH SERVICES | Facility: HOME HEALTHCARE | Age: 78
End: 2023-12-17
Payer: MEDICARE

## 2023-12-17 PROCEDURE — G0156 HHCP-SVS OF AIDE,EA 15 MIN: HCPCS

## 2023-12-18 ENCOUNTER — HOME CARE VISIT (OUTPATIENT)
Dept: HOME HEALTH SERVICES | Facility: HOME HEALTHCARE | Age: 78
End: 2023-12-18
Payer: MEDICARE

## 2023-12-18 VITALS
SYSTOLIC BLOOD PRESSURE: 126 MMHG | DIASTOLIC BLOOD PRESSURE: 70 MMHG | TEMPERATURE: 98.4 F | RESPIRATION RATE: 24 BRPM | HEART RATE: 78 BPM

## 2023-12-18 PROCEDURE — G0299 HHS/HOSPICE OF RN EA 15 MIN: HCPCS

## 2023-12-19 ENCOUNTER — HOME CARE VISIT (OUTPATIENT)
Dept: HOME HEALTH SERVICES | Facility: HOME HEALTHCARE | Age: 78
End: 2023-12-19
Payer: MEDICARE

## 2023-12-19 PROCEDURE — G0156 HHCP-SVS OF AIDE,EA 15 MIN: HCPCS

## 2023-12-20 ENCOUNTER — HOME CARE VISIT (OUTPATIENT)
Dept: HOME HEALTH SERVICES | Facility: HOME HEALTHCARE | Age: 78
End: 2023-12-20
Payer: MEDICARE

## 2023-12-20 PROCEDURE — G0156 HHCP-SVS OF AIDE,EA 15 MIN: HCPCS

## 2023-12-21 ENCOUNTER — HOME CARE VISIT (OUTPATIENT)
Dept: HOME HEALTH SERVICES | Facility: HOME HEALTHCARE | Age: 78
End: 2023-12-21
Payer: MEDICARE

## 2023-12-21 ENCOUNTER — HOME CARE VISIT (OUTPATIENT)
Dept: HOME HOSPICE | Facility: HOSPICE | Age: 78
End: 2023-12-21
Payer: MEDICARE

## 2023-12-21 PROCEDURE — G0155 HHCP-SVS OF CSW,EA 15 MIN: HCPCS

## 2023-12-21 PROCEDURE — G0299 HHS/HOSPICE OF RN EA 15 MIN: HCPCS

## 2023-12-21 PROCEDURE — G0156 HHCP-SVS OF AIDE,EA 15 MIN: HCPCS

## 2023-12-26 ENCOUNTER — HOME CARE VISIT (OUTPATIENT)
Dept: HOME HEALTH SERVICES | Facility: HOME HEALTHCARE | Age: 78
End: 2023-12-26
Payer: MEDICARE

## 2023-12-26 PROCEDURE — G0156 HHCP-SVS OF AIDE,EA 15 MIN: HCPCS

## 2023-12-27 ENCOUNTER — HOME CARE VISIT (OUTPATIENT)
Dept: HOME HOSPICE | Facility: HOSPICE | Age: 78
End: 2023-12-27
Payer: MEDICARE

## 2023-12-27 ENCOUNTER — HOME CARE VISIT (OUTPATIENT)
Dept: HOME HEALTH SERVICES | Facility: HOME HEALTHCARE | Age: 78
End: 2023-12-27
Payer: MEDICARE

## 2023-12-27 VITALS
DIASTOLIC BLOOD PRESSURE: 78 MMHG | TEMPERATURE: 98.2 F | RESPIRATION RATE: 24 BRPM | HEART RATE: 78 BPM | SYSTOLIC BLOOD PRESSURE: 136 MMHG

## 2023-12-27 PROCEDURE — G0156 HHCP-SVS OF AIDE,EA 15 MIN: HCPCS

## 2023-12-27 PROCEDURE — G0299 HHS/HOSPICE OF RN EA 15 MIN: HCPCS

## 2023-12-28 ENCOUNTER — HOME CARE VISIT (OUTPATIENT)
Dept: HOME HEALTH SERVICES | Facility: HOME HEALTHCARE | Age: 78
End: 2023-12-28
Payer: MEDICARE

## 2023-12-28 ENCOUNTER — HOME CARE VISIT (OUTPATIENT)
Dept: HOME HOSPICE | Facility: HOSPICE | Age: 78
End: 2023-12-28
Payer: MEDICARE

## 2023-12-28 PROCEDURE — G0156 HHCP-SVS OF AIDE,EA 15 MIN: HCPCS

## 2023-12-29 ENCOUNTER — HOME CARE VISIT (OUTPATIENT)
Dept: HOME HEALTH SERVICES | Facility: HOME HEALTHCARE | Age: 78
End: 2023-12-29
Payer: MEDICARE

## 2023-12-29 PROCEDURE — G0156 HHCP-SVS OF AIDE,EA 15 MIN: HCPCS

## 2024-01-01 ENCOUNTER — HOME CARE VISIT (OUTPATIENT)
Dept: HOME HOSPICE | Facility: HOSPICE | Age: 79
End: 2024-01-01
Payer: MEDICARE

## 2024-01-01 ENCOUNTER — HOME CARE VISIT (OUTPATIENT)
Dept: HOME HEALTH SERVICES | Facility: HOME HEALTHCARE | Age: 79
End: 2024-01-01
Payer: MEDICARE

## 2024-01-01 VITALS — HEART RATE: 58 BPM | DIASTOLIC BLOOD PRESSURE: 78 MMHG | RESPIRATION RATE: 22 BRPM | SYSTOLIC BLOOD PRESSURE: 112 MMHG

## 2024-01-01 VITALS
TEMPERATURE: 98.2 F | SYSTOLIC BLOOD PRESSURE: 130 MMHG | DIASTOLIC BLOOD PRESSURE: 78 MMHG | HEART RATE: 76 BPM | RESPIRATION RATE: 24 BRPM

## 2024-01-01 VITALS — SYSTOLIC BLOOD PRESSURE: 80 MMHG | HEART RATE: 102 BPM | RESPIRATION RATE: 20 BRPM | DIASTOLIC BLOOD PRESSURE: 40 MMHG

## 2024-01-01 PROCEDURE — G0156 HHCP-SVS OF AIDE,EA 15 MIN: HCPCS

## 2024-01-01 PROCEDURE — G0299 HHS/HOSPICE OF RN EA 15 MIN: HCPCS

## 2024-01-02 ENCOUNTER — HOME CARE VISIT (OUTPATIENT)
Dept: HOME HEALTH SERVICES | Facility: HOME HEALTHCARE | Age: 79
End: 2024-01-02
Payer: MEDICARE

## 2024-01-02 PROCEDURE — G0156 HHCP-SVS OF AIDE,EA 15 MIN: HCPCS

## 2024-01-03 ENCOUNTER — HOME CARE VISIT (OUTPATIENT)
Dept: HOME HEALTH SERVICES | Facility: HOME HEALTHCARE | Age: 79
End: 2024-01-03
Payer: MEDICARE

## 2024-01-03 PROCEDURE — G0299 HHS/HOSPICE OF RN EA 15 MIN: HCPCS

## 2024-01-03 PROCEDURE — G0156 HHCP-SVS OF AIDE,EA 15 MIN: HCPCS

## 2024-01-04 VITALS
SYSTOLIC BLOOD PRESSURE: 138 MMHG | HEART RATE: 78 BPM | DIASTOLIC BLOOD PRESSURE: 78 MMHG | TEMPERATURE: 98.6 F | RESPIRATION RATE: 24 BRPM

## 2024-01-05 ENCOUNTER — HOME CARE VISIT (OUTPATIENT)
Dept: HOME HEALTH SERVICES | Facility: HOME HEALTHCARE | Age: 79
End: 2024-01-05
Payer: MEDICARE

## 2024-01-05 PROCEDURE — G0156 HHCP-SVS OF AIDE,EA 15 MIN: HCPCS

## 2024-01-05 PROCEDURE — G0299 HHS/HOSPICE OF RN EA 15 MIN: HCPCS

## 2024-01-06 ENCOUNTER — HOME CARE VISIT (OUTPATIENT)
Dept: HOME HEALTH SERVICES | Facility: HOME HEALTHCARE | Age: 79
End: 2024-01-06
Payer: MEDICARE

## 2024-01-06 PROCEDURE — G0156 HHCP-SVS OF AIDE,EA 15 MIN: HCPCS

## 2024-01-09 ENCOUNTER — HOME CARE VISIT (OUTPATIENT)
Dept: HOME HEALTH SERVICES | Facility: HOME HEALTHCARE | Age: 79
End: 2024-01-09
Payer: MEDICARE

## 2024-01-09 PROCEDURE — G0156 HHCP-SVS OF AIDE,EA 15 MIN: HCPCS

## 2024-01-10 ENCOUNTER — HOME CARE VISIT (OUTPATIENT)
Dept: HOME HEALTH SERVICES | Facility: HOME HEALTHCARE | Age: 79
End: 2024-01-10
Payer: MEDICARE

## 2024-01-10 PROCEDURE — G0156 HHCP-SVS OF AIDE,EA 15 MIN: HCPCS

## 2024-01-11 ENCOUNTER — HOME CARE VISIT (OUTPATIENT)
Dept: HOME HEALTH SERVICES | Facility: HOME HEALTHCARE | Age: 79
End: 2024-01-11
Payer: MEDICARE

## 2024-01-11 PROCEDURE — G0156 HHCP-SVS OF AIDE,EA 15 MIN: HCPCS

## 2024-01-11 PROCEDURE — G0299 HHS/HOSPICE OF RN EA 15 MIN: HCPCS

## 2024-01-12 ENCOUNTER — HOME CARE VISIT (OUTPATIENT)
Dept: HOME HOSPICE | Facility: HOSPICE | Age: 79
End: 2024-01-12
Payer: MEDICARE

## 2024-01-12 ENCOUNTER — HOME CARE VISIT (OUTPATIENT)
Dept: HOME HEALTH SERVICES | Facility: HOME HEALTHCARE | Age: 79
End: 2024-01-12
Payer: MEDICARE

## 2024-01-12 VITALS
BODY MASS INDEX: 39.3 KG/M2 | DIASTOLIC BLOOD PRESSURE: 78 MMHG | HEART RATE: 78 BPM | RESPIRATION RATE: 26 BRPM | TEMPERATURE: 98.6 F | WEIGHT: 208 LBS | SYSTOLIC BLOOD PRESSURE: 136 MMHG

## 2024-01-12 PROCEDURE — G0156 HHCP-SVS OF AIDE,EA 15 MIN: HCPCS

## 2024-01-12 PROCEDURE — G0155 HHCP-SVS OF CSW,EA 15 MIN: HCPCS

## 2024-01-15 ENCOUNTER — HOME CARE VISIT (OUTPATIENT)
Dept: HOME HEALTH SERVICES | Facility: HOME HEALTHCARE | Age: 79
End: 2024-01-15
Payer: MEDICARE

## 2024-01-15 VITALS
DIASTOLIC BLOOD PRESSURE: 70 MMHG | RESPIRATION RATE: 24 BRPM | HEART RATE: 72 BPM | SYSTOLIC BLOOD PRESSURE: 138 MMHG | TEMPERATURE: 98.2 F

## 2024-01-15 PROCEDURE — G0156 HHCP-SVS OF AIDE,EA 15 MIN: HCPCS

## 2024-01-15 PROCEDURE — G0299 HHS/HOSPICE OF RN EA 15 MIN: HCPCS

## 2024-01-16 ENCOUNTER — HOME CARE VISIT (OUTPATIENT)
Dept: HOME HEALTH SERVICES | Facility: HOME HEALTHCARE | Age: 79
End: 2024-01-16
Payer: MEDICARE

## 2024-01-16 PROCEDURE — G0156 HHCP-SVS OF AIDE,EA 15 MIN: HCPCS

## 2024-01-17 ENCOUNTER — HOME CARE VISIT (OUTPATIENT)
Dept: HOME HEALTH SERVICES | Facility: HOME HEALTHCARE | Age: 79
End: 2024-01-17
Payer: MEDICARE

## 2024-01-17 PROCEDURE — G0156 HHCP-SVS OF AIDE,EA 15 MIN: HCPCS

## 2024-01-18 ENCOUNTER — HOME CARE VISIT (OUTPATIENT)
Dept: HOME HOSPICE | Facility: HOSPICE | Age: 79
End: 2024-01-18
Payer: MEDICARE

## 2024-01-18 ENCOUNTER — HOME CARE VISIT (OUTPATIENT)
Dept: HOME HEALTH SERVICES | Facility: HOME HEALTHCARE | Age: 79
End: 2024-01-18
Payer: MEDICARE

## 2024-01-18 PROCEDURE — G0299 HHS/HOSPICE OF RN EA 15 MIN: HCPCS

## 2024-01-18 PROCEDURE — G0156 HHCP-SVS OF AIDE,EA 15 MIN: HCPCS

## 2024-01-19 ENCOUNTER — HOME CARE VISIT (OUTPATIENT)
Dept: HOME HEALTH SERVICES | Facility: HOME HEALTHCARE | Age: 79
End: 2024-01-19
Payer: MEDICARE

## 2024-01-19 PROCEDURE — G0156 HHCP-SVS OF AIDE,EA 15 MIN: HCPCS

## 2024-01-20 ENCOUNTER — HOME CARE VISIT (OUTPATIENT)
Dept: HOME HOSPICE | Facility: HOSPICE | Age: 79
End: 2024-01-20
Payer: MEDICARE

## 2024-01-21 ENCOUNTER — HOME CARE VISIT (OUTPATIENT)
Dept: HOME HEALTH SERVICES | Facility: HOME HEALTHCARE | Age: 79
End: 2024-01-21
Payer: MEDICARE

## 2024-01-21 PROCEDURE — G0156 HHCP-SVS OF AIDE,EA 15 MIN: HCPCS

## 2024-01-22 ENCOUNTER — HOME CARE VISIT (OUTPATIENT)
Dept: HOME HEALTH SERVICES | Facility: HOME HEALTHCARE | Age: 79
End: 2024-01-22
Payer: MEDICARE

## 2024-01-22 VITALS
SYSTOLIC BLOOD PRESSURE: 128 MMHG | DIASTOLIC BLOOD PRESSURE: 76 MMHG | RESPIRATION RATE: 20 BRPM | HEART RATE: 78 BPM | TEMPERATURE: 97.6 F

## 2024-01-22 PROCEDURE — G0299 HHS/HOSPICE OF RN EA 15 MIN: HCPCS

## 2024-01-24 ENCOUNTER — HOME CARE VISIT (OUTPATIENT)
Dept: HOME HEALTH SERVICES | Facility: HOME HEALTHCARE | Age: 79
End: 2024-01-24
Payer: MEDICARE

## 2024-01-24 PROCEDURE — G0156 HHCP-SVS OF AIDE,EA 15 MIN: HCPCS

## 2024-01-25 ENCOUNTER — HOME CARE VISIT (OUTPATIENT)
Dept: HOME HEALTH SERVICES | Facility: HOME HEALTHCARE | Age: 79
End: 2024-01-25
Payer: MEDICARE

## 2024-01-25 PROCEDURE — G0156 HHCP-SVS OF AIDE,EA 15 MIN: HCPCS

## 2024-01-25 PROCEDURE — G0299 HHS/HOSPICE OF RN EA 15 MIN: HCPCS

## 2024-01-26 ENCOUNTER — HOME CARE VISIT (OUTPATIENT)
Dept: HOME HEALTH SERVICES | Facility: HOME HEALTHCARE | Age: 79
End: 2024-01-26
Payer: MEDICARE

## 2024-01-26 PROCEDURE — G0156 HHCP-SVS OF AIDE,EA 15 MIN: HCPCS

## 2024-01-27 ENCOUNTER — HOME CARE VISIT (OUTPATIENT)
Dept: HOME HEALTH SERVICES | Facility: HOME HEALTHCARE | Age: 79
End: 2024-01-27
Payer: MEDICARE

## 2024-01-27 PROCEDURE — G0156 HHCP-SVS OF AIDE,EA 15 MIN: HCPCS

## 2024-01-28 ENCOUNTER — HOME CARE VISIT (OUTPATIENT)
Dept: HOME HEALTH SERVICES | Facility: HOME HEALTHCARE | Age: 79
End: 2024-01-28
Payer: MEDICARE

## 2024-01-28 PROCEDURE — G0156 HHCP-SVS OF AIDE,EA 15 MIN: HCPCS

## 2024-01-29 ENCOUNTER — HOME CARE VISIT (OUTPATIENT)
Dept: HOME HEALTH SERVICES | Facility: HOME HEALTHCARE | Age: 79
End: 2024-01-29
Payer: MEDICARE

## 2024-01-29 VITALS — HEART RATE: 68 BPM | DIASTOLIC BLOOD PRESSURE: 68 MMHG | SYSTOLIC BLOOD PRESSURE: 112 MMHG | RESPIRATION RATE: 15 BRPM

## 2024-01-29 PROCEDURE — G0299 HHS/HOSPICE OF RN EA 15 MIN: HCPCS

## 2024-01-30 ENCOUNTER — HOME CARE VISIT (OUTPATIENT)
Dept: HOME HEALTH SERVICES | Facility: HOME HEALTHCARE | Age: 79
End: 2024-01-30
Payer: MEDICARE

## 2024-01-30 ENCOUNTER — HOME CARE VISIT (OUTPATIENT)
Dept: HOME HOSPICE | Facility: HOSPICE | Age: 79
End: 2024-01-30
Payer: MEDICARE

## 2024-01-30 PROCEDURE — G0155 HHCP-SVS OF CSW,EA 15 MIN: HCPCS

## 2024-01-30 PROCEDURE — G0156 HHCP-SVS OF AIDE,EA 15 MIN: HCPCS

## 2024-01-31 ENCOUNTER — HOME CARE VISIT (OUTPATIENT)
Dept: HOME HEALTH SERVICES | Facility: HOME HEALTHCARE | Age: 79
End: 2024-01-31
Payer: MEDICARE

## 2024-01-31 PROCEDURE — G0156 HHCP-SVS OF AIDE,EA 15 MIN: HCPCS

## 2024-02-01 ENCOUNTER — HOME CARE VISIT (OUTPATIENT)
Dept: HOME HOSPICE | Facility: HOSPICE | Age: 79
End: 2024-02-01
Payer: MEDICARE

## 2024-02-01 ENCOUNTER — HOME CARE VISIT (OUTPATIENT)
Dept: HOME HEALTH SERVICES | Facility: HOME HEALTHCARE | Age: 79
End: 2024-02-01
Payer: MEDICARE

## 2024-02-01 VITALS — RESPIRATION RATE: 16 BRPM | HEART RATE: 72 BPM

## 2024-02-01 PROCEDURE — G0156 HHCP-SVS OF AIDE,EA 15 MIN: HCPCS

## 2024-02-01 PROCEDURE — G0300 HHS/HOSPICE OF LPN EA 15 MIN: HCPCS

## 2024-02-02 ENCOUNTER — HOME CARE VISIT (OUTPATIENT)
Dept: HOME HEALTH SERVICES | Facility: HOME HEALTHCARE | Age: 79
End: 2024-02-02
Payer: MEDICARE

## 2024-02-02 PROCEDURE — G0156 HHCP-SVS OF AIDE,EA 15 MIN: HCPCS

## 2024-02-05 ENCOUNTER — HOME CARE VISIT (OUTPATIENT)
Dept: HOME HEALTH SERVICES | Facility: HOME HEALTHCARE | Age: 79
End: 2024-02-05
Payer: MEDICARE

## 2024-02-05 PROCEDURE — G0156 HHCP-SVS OF AIDE,EA 15 MIN: HCPCS

## 2024-02-06 ENCOUNTER — HOME CARE VISIT (OUTPATIENT)
Dept: HOME HEALTH SERVICES | Facility: HOME HEALTHCARE | Age: 79
End: 2024-02-06
Payer: MEDICARE

## 2024-02-06 VITALS
RESPIRATION RATE: 24 BRPM | DIASTOLIC BLOOD PRESSURE: 78 MMHG | TEMPERATURE: 98.4 F | HEART RATE: 78 BPM | SYSTOLIC BLOOD PRESSURE: 128 MMHG

## 2024-02-06 PROCEDURE — G0156 HHCP-SVS OF AIDE,EA 15 MIN: HCPCS

## 2024-02-06 PROCEDURE — G0299 HHS/HOSPICE OF RN EA 15 MIN: HCPCS

## 2024-02-07 ENCOUNTER — HOME CARE VISIT (OUTPATIENT)
Dept: HOME HEALTH SERVICES | Facility: HOME HEALTHCARE | Age: 79
End: 2024-02-07
Payer: MEDICARE

## 2024-02-07 PROCEDURE — G0156 HHCP-SVS OF AIDE,EA 15 MIN: HCPCS

## 2024-02-08 ENCOUNTER — HOME CARE VISIT (OUTPATIENT)
Dept: HOME HEALTH SERVICES | Facility: HOME HEALTHCARE | Age: 79
End: 2024-02-08
Payer: MEDICARE

## 2024-02-08 PROCEDURE — G0156 HHCP-SVS OF AIDE,EA 15 MIN: HCPCS

## 2024-02-08 PROCEDURE — G0299 HHS/HOSPICE OF RN EA 15 MIN: HCPCS

## 2024-02-09 ENCOUNTER — HOME CARE VISIT (OUTPATIENT)
Dept: HOME HEALTH SERVICES | Facility: HOME HEALTHCARE | Age: 79
End: 2024-02-09
Payer: MEDICARE

## 2024-02-09 PROCEDURE — G0156 HHCP-SVS OF AIDE,EA 15 MIN: HCPCS

## 2024-02-11 ENCOUNTER — DOCUMENTATION (OUTPATIENT)
Dept: PALLIATIVE MEDICINE | Facility: HOSPITAL | Age: 79
End: 2024-02-11

## 2024-02-11 ENCOUNTER — HOME CARE VISIT (OUTPATIENT)
Dept: HOME HEALTH SERVICES | Facility: HOME HEALTHCARE | Age: 79
End: 2024-02-11
Payer: MEDICARE

## 2024-02-11 VITALS — HEART RATE: 62 BPM | DIASTOLIC BLOOD PRESSURE: 68 MMHG | SYSTOLIC BLOOD PRESSURE: 140 MMHG | RESPIRATION RATE: 18 BRPM

## 2024-02-11 DIAGNOSIS — Z51.5 HOSPICE CARE: Primary | ICD-10-CM

## 2024-02-11 PROCEDURE — G0299 HHS/HOSPICE OF RN EA 15 MIN: HCPCS

## 2024-02-11 RX ORDER — MORPHINE SULFATE 100 MG/5ML
SOLUTION ORAL
Qty: 30 ML | Refills: 0 | Status: SHIPPED | OUTPATIENT
Start: 2024-02-11 | End: 2024-02-23

## 2024-02-13 ENCOUNTER — HOME CARE VISIT (OUTPATIENT)
Dept: HOME HOSPICE | Facility: HOSPICE | Age: 79
End: 2024-02-13
Payer: MEDICARE

## 2024-02-14 ENCOUNTER — HOME CARE VISIT (OUTPATIENT)
Dept: HOME HEALTH SERVICES | Facility: HOME HEALTHCARE | Age: 79
End: 2024-02-14
Payer: MEDICARE

## 2024-02-14 PROCEDURE — G0156 HHCP-SVS OF AIDE,EA 15 MIN: HCPCS

## 2024-02-15 ENCOUNTER — HOME CARE VISIT (OUTPATIENT)
Dept: HOME HEALTH SERVICES | Facility: HOME HEALTHCARE | Age: 79
End: 2024-02-15
Payer: MEDICARE

## 2024-02-15 ENCOUNTER — HOME CARE VISIT (OUTPATIENT)
Dept: HOME HOSPICE | Facility: HOSPICE | Age: 79
End: 2024-02-15
Payer: MEDICARE

## 2024-02-15 PROCEDURE — G0299 HHS/HOSPICE OF RN EA 15 MIN: HCPCS

## 2024-02-15 PROCEDURE — G0156 HHCP-SVS OF AIDE,EA 15 MIN: HCPCS

## 2024-02-17 ENCOUNTER — HOME CARE VISIT (OUTPATIENT)
Dept: HOME HEALTH SERVICES | Facility: HOME HEALTHCARE | Age: 79
End: 2024-02-17
Payer: MEDICARE

## 2024-02-17 PROCEDURE — G0156 HHCP-SVS OF AIDE,EA 15 MIN: HCPCS

## 2024-02-18 ENCOUNTER — HOME CARE VISIT (OUTPATIENT)
Dept: HOME HEALTH SERVICES | Facility: HOME HEALTHCARE | Age: 79
End: 2024-02-18
Payer: MEDICARE

## 2024-02-18 PROCEDURE — G0156 HHCP-SVS OF AIDE,EA 15 MIN: HCPCS

## 2024-02-19 ENCOUNTER — HOME CARE VISIT (OUTPATIENT)
Dept: HOME HOSPICE | Facility: HOSPICE | Age: 79
End: 2024-02-19
Payer: MEDICARE

## 2024-02-19 PROCEDURE — G0155 HHCP-SVS OF CSW,EA 15 MIN: HCPCS

## 2024-02-20 ENCOUNTER — HOME CARE VISIT (OUTPATIENT)
Dept: HOME HEALTH SERVICES | Facility: HOME HEALTHCARE | Age: 79
End: 2024-02-20
Payer: MEDICARE

## 2024-02-20 PROCEDURE — G0299 HHS/HOSPICE OF RN EA 15 MIN: HCPCS

## 2024-02-20 PROCEDURE — G0156 HHCP-SVS OF AIDE,EA 15 MIN: HCPCS

## 2024-02-21 ENCOUNTER — HOME CARE VISIT (OUTPATIENT)
Dept: HOME HEALTH SERVICES | Facility: HOME HEALTHCARE | Age: 79
End: 2024-02-21
Payer: MEDICARE

## 2024-02-21 VITALS
HEART RATE: 70 BPM | DIASTOLIC BLOOD PRESSURE: 60 MMHG | TEMPERATURE: 98.4 F | RESPIRATION RATE: 22 BRPM | SYSTOLIC BLOOD PRESSURE: 130 MMHG

## 2024-02-21 PROBLEM — S01.91XA TRAUMATIC HEAD INJURY WITH MULTIPLE LACERATIONS: Status: RESOLVED | Noted: 2020-10-02 | Resolved: 2024-02-21

## 2024-02-21 PROBLEM — S09.90XA TRAUMATIC HEAD INJURY WITH MULTIPLE LACERATIONS: Status: RESOLVED | Noted: 2020-10-02 | Resolved: 2024-02-21

## 2024-02-21 PROCEDURE — G0156 HHCP-SVS OF AIDE,EA 15 MIN: HCPCS

## 2024-02-22 ENCOUNTER — HOME CARE VISIT (OUTPATIENT)
Dept: HOME HEALTH SERVICES | Facility: HOME HEALTHCARE | Age: 79
End: 2024-02-22
Payer: MEDICARE

## 2024-02-22 PROCEDURE — G0156 HHCP-SVS OF AIDE,EA 15 MIN: HCPCS

## 2024-02-22 PROCEDURE — G0299 HHS/HOSPICE OF RN EA 15 MIN: HCPCS

## 2024-02-23 ENCOUNTER — HOME CARE VISIT (OUTPATIENT)
Dept: HOME HEALTH SERVICES | Facility: HOME HEALTHCARE | Age: 79
End: 2024-02-23
Payer: MEDICARE

## 2024-02-23 PROCEDURE — G0156 HHCP-SVS OF AIDE,EA 15 MIN: HCPCS

## 2024-02-24 ENCOUNTER — HOME CARE VISIT (OUTPATIENT)
Dept: HOME HOSPICE | Facility: HOSPICE | Age: 79
End: 2024-02-24
Payer: MEDICARE

## 2024-03-14 ENCOUNTER — HOME CARE VISIT (OUTPATIENT)
Dept: HOME HOSPICE | Facility: HOSPICE | Age: 79
End: 2024-03-14
Payer: MEDICARE

## 2024-03-14 NOTE — CASE COMMUNICATION
"Cadence Steiner, Bereavement Final IDG 3/12/24 (1MR, 2LR) Due: 3/29/24   : 1945  SOC: 23  DOD: 3/1/24  Diagnosis: CKD, Aortic Stenosis  Primary: Niece - Elsie Vila was a 77 year old  woman at [facility]. No children. Niece Elsie described her as the life of the party. Cadence lived with her roommate, Dahiana, at [facility] for 8 years. Elsie stated her aunt was not Yarsanism or a churchgoer. Cadence mentioned being Refor med and baptized. Her neighbor would take her to [employer] every . \"I couldn't get away. I did enjoy the fellowship at [employer].\" [facility] would provide visits and support. She enjoyed visits from her nieces. Elsie mentioned support from her  and peace community. Niece Joanna mentioned the quick decline and visited at EOL. Elsie and Joanna are assessed at . Dahiana is assessed at  for bereavement follow-up.    TOD: Cadence was p ronounced by facility staff at [facility]. Family was not present at TOD. Elsie declined support services at this time. She said her sister would reach out if needed.    Call Assignments:  DUSTIN Mitchell to reassess roommate Dahiana Daniel at [facility]. (Due: 3/15/24)  *POC already set for Dahiana and initial bereavement call made by BC*  Chaplain Martin to reassess niece Elsie Valladares at . (Due: 3/29/24)  Chaplain Bansal to reassess niece Joanna Elsie at .  Please request Joanna's address for bereavement follow-up, as able. (Due: 3/29/24)    "

## 2024-08-12 NOTE — MALNUTRITION/BMI
Clinic Care Coordination Contact  Clinic Care Coordination Contact  OUTREACH with Post Discharge Assessment    Referral Information: RN CC contacted patient's guardian, Lola (mom), for TCM outreach and monthly outreach.   Referral Source: IP Report     Chief Complaint   Patient presents with    Clinic Care Coordination - Follow-up      Universal Utilization: Risk of admission or ED visit 85.9%   Utilization      No Show Count (past year)  0             ED Visits  4             Hospital Admissions  2                    Current as of: 8/12/2024  8:38 AM              Clinical Concerns:  Current Medical Concerns:  Doing much better with hygiene cares with assistance from home care.     Current Behavioral Concerns: None.     Education Provided to patient: None, spoke with guardian.       Health Maintenance Reviewed:    Clinical Pathway: None    Transitions of Care Outreach    Most Recent Admission Date: 7/1/2024   Most Recent Admission Diagnosis: Acute cholecystitis - K81.0  Intertrigo - L30.4  Hepatic steatosis - K76.0  Symptomatic cholelithiasis - K80.20  Abdominal pain, unspecified abdominal location - R10.9  Hematuria, unspecified type - R31.9     Most Recent Discharge Date: 7/6/2024   Most Recent Discharge Diagnosis: Abdominal pain, unspecified abdominal location - R10.9  Symptomatic cholelithiasis - K80.20  Hepatic steatosis - K76.0  Intertrigo - L30.4  Hematuria, unspecified type - R31.9  Acute cholecystitis - K81.0  Moderate persistent asthma with acute exacerbation - J45.41     Transitions of Care Assessment    Discharge Assessment  How are you doing now that you are home?: She's doing well and getting home care at home.  How are your symptoms? (Red Flag symptoms escalate to triage hotline per guidelines): Improved  Do you know how to contact your clinic care team if you have future questions or changes to your health status? : Yes  Does the patient have their discharge instructions? : Yes  Does the patient  This medical record reflects one or more clinical indicators suggestive of malnutrition and/or morbid obesity  BMI Findings:  Adult BMI Classifications: Morbid Obesity 40-44 9     Body mass index is 41 33 kg/m²  See Nutrition note dated 12/23/20  for additional details  Completed nutrition assessment is viewable in the nutrition documentation  have questions regarding their discharge instructions? : No  Were you started on any new medications or were there changes to any of your previous medications? : No  Does the patient have all of their medications?: Yes  Do you have questions regarding any of your medications? : No  Do you have all of your needed medical supplies or equipment (DME)?  (i.e. oxygen tank, CPAP, cane, etc.): Yes    Post-op (CH[facility] CTA Only)  If the patient had a surgery or procedure, do they have any questions for a nurse?: No    Post-op (Clinicians Only)  Did the patient have surgery or a procedure: No  Fever: No  Chills: No  Eating & Drinking: eating and drinking without complaints/concerns  PO Intake: regular diet  Bowel Function: normal  Date of last BM:  (Unknown)  Urinary Status: voiding without complaint/concerns    Care Management       Care Mgmt General Assessment  Referral  Referral Source: IP Report  Health Care Home/Utilization  Preferred Hospital: [facility]  [phone]  Preferred Urgent Care:  (States she does not use Urgent Care.)  Living Situation  Current living arrangement:: Other (Lives in an apartment with significant other)  Type of residence:: Apartment  Resources  Patient receiving home care services:: No  Community Resources: [employer];County Worker;Other (see comment); (ABDIEL maurer, moms david)  Supplies Currently Used at Home: None  Equipment Currently Used at Home: tub bench;grab bar, tub/shower;other (see comments) (Bariatric walker with a seat)  Referrals Placed: None  Employment Status: disabled  Psychosocial  Alevism or spiritual beliefs that impact treatment:: No  Mental health DX:: No  Mental health management concern (GOAL):: No  Chemical Dependency Status: No Current Concerns  Chemical Dependency Management:  (N/A)  Informal Support system:: Parent;Significant other  Functional Status  Dependent ADLs:: Ambulation-walker;Incontinence  Dependent IADLs::  Cleaning;Cooking;Laundry;Shopping;Meal Preparation;Transportation;Incontinence  Bed or wheelchair confined:: No  Mobility Status: Dependent/Assisted by Another  Fallen 2 or more times in the past year?: No  Any fall with injury in the past year?: No  Advance Care Plan/Directive  Advanced Care Plans/Directives on file:: Yes  Status of record:: On File and Validated  Type Advanced Care Plans/Directives: Advanced Directive - On File and     Care Mgmt Encounter Assessment  Preventative Care  Routine Health maintenance Reviewed: Due/Overdue (Did not discuss during TCM outreach.)  Clinic Utilization  Difficulty keeping appointments:: No  Compliance Concerns: No  No-Show Concerns: No  No PCP office visit in Past Year: No  Transportation  Transportation means:: Medical transport;Family        Barriers in Communication  Other concerns:: Cognitive impairment;Physical impairment     Medication Review  Medication adherence problem (GOAL):: No  Knowledgeable about how to use meds:: Yes  Medication side effects suspected:: No  Diet/Exercise/Sleep  Diet:: Regular  Inadequate nutrition (GOAL):: No  Tube Feeding: No  Inadequate activity/exercise (GOAL):: No  Significant changes in sleep pattern (GOAL): No    Medication Management:  Medication review status: Medications reviewed on 8/7/24 during Primary Care Provider follow up appointment. RN CC did not re-review during outreach.   Current Outpatient Medications   Medication Sig Dispense Refill    acetaminophen (TYLENOL) 325 MG tablet Take 3 tablets (975 mg) by mouth every 8 hours as needed for mild pain or other (and adjunct with moderate or severe pain or per patient request)      albuterol (PROVENTIL) (2.5 MG/3ML) 0.083% neb solution Take 1 vial (2.5 mg) by nebulization every 4 hours as needed for shortness of breath, wheezing or cough 75 mL 0    azelastine (ASTELIN) 0.1 % nasal spray Spray 1 spray into both nostrils daily      carBAMazepine (TEGRETOL) 200 MG tablet TAKE TWO  TABLETS BY MOUTH IN THE MORNING, TAKE ONE TABLET BY MOUTH AT NOON (WITH LUNCH) AND TAKE TWO TABLETS BY MOUTH AT NIGHT 450 tablet 3    fluticasone (FLONASE) 50 MCG/ACT nasal spray USE ONE SPRAY INTO BOTH NOSTRILS DAILY AS NEEDED FOR RHINITIS OR ALLERGIES 16 mL 1    Fluticasone Furoate-Vilanterol (BREO ELLIPTA) 50-25 MCG/ACT AEPB Inhale 1 puff into the lungs 2 times daily      ipratropium - albuterol 0.5 mg/2.5 mg/3 mL (DUONEB) 0.5-2.5 (3) MG/3ML neb solution Take 1 vial (3 mLs) by nebulization 4 times daily 90 mL 0    lisinopril (ZESTRIL) 20 MG tablet TAKE 1 TABLET (20 MG) BY MOUTH DAILY 90 tablet 2    lovastatin (MEVACOR) 40 MG tablet TAKE 1 TABLET (40 MG) BY MOUTH AT BEDTIME 90 tablet 1    miconazole (MICATIN) 2 % external powder Apply topically 2 times daily      Multiple Vitamin (MULTI-VITAMIN PO) Take 1 tablet by mouth daily      nystatin (MYCOSTATIN) 971606 UNIT/GM external cream Apply topically daily as needed      oxyCODONE (ROXICODONE) 5 MG tablet Take 1 tablet (5 mg) by mouth every 4 hours as needed for severe pain (IF pain not managed with non-pharmacological and non-opioid interventions) 6 tablet 0    pantoprazole (PROTONIX) 40 MG EC tablet Take 1 tablet (40 mg) by mouth daily before breakfast      polyethylene glycol (MIRALAX) 17 GM/Dose powder Take 17 g by mouth daily      senna-docusate (SENOKOT-S/PERICOLACE) 8.6-50 MG tablet Take 1 tablet by mouth daily as needed for constipation      sertraline (ZOLOFT) 100 MG tablet TAKE 1 TABLET (100 MG) BY MOUTH DAILY 90 tablet 1     No current facility-administered medications for this visit.      Allergies   Allergen Reactions    Aspirin Unknown    Iodine      Iodinated Contrast Media  --- Hives      Penicillins Unknown    Tetracyclines & Related Unknown    Hydrochlorothiazide Palpitations     dehydration    Influenza Vac Split [Influenza Virus Vaccine] Rash     Patient states she is allergic to the vaccine.  Got a rash all over body many years ago after  receiving injection.     Functional Status:  Dependent ADLs:: Ambulation-walker, Incontinence  Dependent IADLs:: Cleaning, Cooking, Laundry, Shopping, Meal Preparation, Transportation, Incontinence  Bed or wheelchair confined:: No  Mobility Status: Dependent/Assisted by Another  Fallen 2 or more times in the past year?: No  Any fall with injury in the past year?: No    Living Situation:  Current living arrangement:: Other (Lives in an apartment with significant other)  Type of residence:: Apartment    Lifestyle & Psychosocial Needs:    Social Determinants of Health     Food Insecurity: Low Risk  (5/14/2024)    Food Insecurity     Within the past 12 months, did you worry that your food would run out before you got money to buy more?: No     Within the past 12 months, did the food you bought just not last and you didn t have money to get more?: No   Depression: Not at risk (8/7/2024)    PHQ-2     PHQ-2 Score: 0   Housing Stability: Low Risk  (5/14/2024)    Housing Stability     Do you have housing? : Yes     Are you worried about losing your housing?: No   Tobacco Use: Low Risk  (8/7/2024)    Patient History     Smoking Tobacco Use: Never     Smokeless Tobacco Use: Never     Passive Exposure: Not on file   Financial Resource Strain: Low Risk  (5/14/2024)    Financial Resource Strain     Within the past 12 months, have you or your family members you live with been unable to get utilities (heat, electricity) when it was really needed?: No   Alcohol Use: Not on file   Transportation Needs: High Risk (5/14/2024)    Transportation Needs     Within the past 12 months, has lack of transportation kept you from medical appointments, getting your medicines, non-medical meetings or appointments, work, or from getting things that you need?: Yes   Physical Activity: Not on file   Interpersonal Safety: Low Risk  (8/7/2024)    Interpersonal Safety     Do you feel physically and emotionally safe where you currently live?: Yes      Within the past 12 months, have you been hit, slapped, kicked or otherwise physically hurt by someone?: No     Within the past 12 months, have you been humiliated or emotionally abused in other ways by your partner or ex-partner?: No   Stress: Not on file   Social Connections: Not on file   Health Literacy: Not on file      Bahai or spiritual beliefs that impact treatment:: No  Mental health DX:: No  Mental health management concern (GOAL):: No  Chemical Dependency Status: No Current Concerns  Chemical Dependency Management:  (N/A)  Informal Support system:: Parent, Significant other      Resources and Interventions:  Current Resources:      Community Resources: ARM, County Worker, Other (see comment),  (CADI waiver, moms meals)  Supplies Currently Used at Home: None  Equipment Currently Used at Home: tub bench, grab bar, tub/shower, other (see comments) (Bariatric walker with a seat)  Employment Status: disabled     Advance Care Plan/Directive  Advanced Care Plans/Directives on file:: Yes  Status of record:: On File and Validated  Type Advanced Care Plans/Directives: Advanced Directive - On File    Referrals Placed: None     Care Plan:   Care Plan: Health Maintenance       Problem: Health Maintenance Due or Overdue       Goal: Become up-to-date with health maintenance visit(s)       Start Date: 5/14/2024 Expected End Date: 11/12/2024    This Visit's Progress: 10% Recent Progress: 0%    Note:     Barriers: Cognitive disability; Physical disability; Poor support from Significant other; Provider availability - wait time to complete appointments.   Strengths: Parents/guardians Motivated; Agreeable to Care Coordination.   Patient expressed understanding of goal: Yes  Action steps to achieve this goal:  1. I will take my medications as prescribed.   2. I will discuss, review, schedule and complete recommended overdue health maintenance with my Primary Care Provider.   3. I will contact my care team  with questions, concerns or support needs. I will use the clinic as a resource and I understand I can contact my clinic with 24/7 after hours services available. Care Coordinator will remain available as needed.                               Care Plan: Resources       Problem: Resources       Goal: Resources for equipment       Start Date: 5/14/2024 Expected End Date: 11/12/2024    This Visit's Progress: 20% Recent Progress: 10%    Note:     Barriers: Cognitive disability; Physical disability; Poor support from Significant other; Provider availability - wait time to complete appointments.   Strengths: Parents/guardians Motivated; Agreeable to Care Coordination.   Patient expressed understanding of goal: Yes  Action steps to achieve this goal:  1. I will review and follow up with resource for medical equipment ([employer] ([phone]) for required forms for Bariatric Electric Wheelchair and bedside commode.   2. I will discuss with CADI  Jagjit Barrientos how to obtain incontinent supplies through [place]'s waiver.   3. I will contact my care team with questions, concerns or support needs. I will use the clinic as a resource and I understand I can contact my clinic with 24/7 after hours services available. Care Coordinator will remain available as needed.                             Patient/Caregiver understanding: Patient/caregiver verbalized understanding and denies any additional questions or concerns at this time. RNCC engaged in [facility] communications during encounter.      Future Appointments                In 5 months Lary Valentin MD [facility] SLEEP          Follow up Plan     Discharge Follow-Up  Discharge follow up appointment scheduled in alignment with recommended follow up timeframe or Transitions of Risk Category? (Low = within 30 days; Moderate= within 14 days; High= within 7 days): Yes  Discharge Follow Up Appointment Date:  08/07/24  Discharge Follow Up Appointment Scheduled with?: Primary Care Provider    RN CC contacted patient for post-hospital follow up and to introduce Care Coordination. Full assessment not indicated as patient is already enrolled in Care Coordination.     Future Appointments   Date Time Provider Department Center   1/13/2025  1:00 PM Lary Valentin MD [facility] SLEEP     Outpatient Plan as outlined on AVS reviewed with patient.    For any urgent concerns, please contact our 24 hour nurse triage line: [phone] ([phone])       Rocio Govea RN, BSN, CPHN, [facility]ulatory Care Management  [facility]  Phone: [phone]  Email: [email or web address]

## 2024-11-11 NOTE — ASSESSMENT & PLAN NOTE
Lab Results   Component Value Date    HGBA1C 5 5 06/04/2020       No results for input(s): POCGLU in the last 72 hours  Blood Sugar Average: Last 72 hrs:   continue home glargine and short-acting insulin t i d   Hold home p o   Medications  Sliding scale insulin yes